# Patient Record
Sex: FEMALE | Race: WHITE | NOT HISPANIC OR LATINO | Employment: PART TIME | URBAN - NONMETROPOLITAN AREA
[De-identification: names, ages, dates, MRNs, and addresses within clinical notes are randomized per-mention and may not be internally consistent; named-entity substitution may affect disease eponyms.]

---

## 2017-01-03 ENCOUNTER — OFFICE VISIT (OUTPATIENT)
Dept: OBSTETRICS AND GYNECOLOGY | Facility: CLINIC | Age: 27
End: 2017-01-03

## 2017-01-03 VITALS
SYSTOLIC BLOOD PRESSURE: 98 MMHG | BODY MASS INDEX: 22.26 KG/M2 | WEIGHT: 121 LBS | DIASTOLIC BLOOD PRESSURE: 70 MMHG | HEIGHT: 62 IN

## 2017-01-03 DIAGNOSIS — F17.200 SMOKER: ICD-10-CM

## 2017-01-03 DIAGNOSIS — Z00.00 ANNUAL PHYSICAL EXAM: Primary | ICD-10-CM

## 2017-01-03 PROCEDURE — 99385 PREV VISIT NEW AGE 18-39: CPT | Performed by: OBSTETRICS & GYNECOLOGY

## 2017-01-03 PROCEDURE — 87591 N.GONORRHOEAE DNA AMP PROB: CPT | Performed by: OBSTETRICS & GYNECOLOGY

## 2017-01-03 PROCEDURE — 87491 CHLMYD TRACH DNA AMP PROBE: CPT | Performed by: OBSTETRICS & GYNECOLOGY

## 2017-01-03 PROCEDURE — G0123 SCREEN CERV/VAG THIN LAYER: HCPCS | Performed by: OBSTETRICS & GYNECOLOGY

## 2017-01-03 RX ORDER — NORGESTIMATE AND ETHINYL ESTRADIOL 7DAYSX3 28
KIT ORAL
COMMUNITY
Start: 2016-12-27 | End: 2018-01-05

## 2017-01-03 RX ORDER — LORAZEPAM 0.5 MG/1
0.5 TABLET ORAL EVERY 8 HOURS PRN
COMMUNITY
End: 2018-02-15

## 2017-01-03 RX ORDER — PRENATAL VIT/IRON FUM/FOLIC AC 27MG-0.8MG
TABLET ORAL
COMMUNITY
Start: 2016-12-27 | End: 2018-08-06 | Stop reason: SDUPTHER

## 2017-01-03 NOTE — MR AVS SNAPSHOT
Jennifer Wood   1/3/2017 10:45 AM   Office Visit    Dept Phone:  [phone]   Encounter #:  [id number]    Provider:  Alice Quesada MD   Department:  [facility]                Your Full Care Plan              Your Updated Medication List          This list is accurate as of: 1/3/17 11:34 AM.  Always use your most recent med list.                LORazepam 0.5 MG tablet   Commonly known as:  ATIVAN       prenatal vitamin 27-0.8 27-0.8 MG tablet tablet       TRI-SPRINTEC 0.18/0.215/0.25 MG-35 MCG per tablet   Generic drug:  norgestimate-ethinyl estradiol               You Were Diagnosed With        Codes Comments    Annual physical exam    -  Primary ICD-10-CM: Z00.00  ICD-9-CM: V70.0     Smoker     ICD-10-CM: F17.200  ICD-9-CM: 305.1     Body mass index (BMI) 22.0-22.9, adult     ICD-10-CM: Z68.22  ICD-9-CM: V85.1       Instructions     None    Patient Instructions History      Upcoming Appointments     Visit Type Date Time Department    NEW GYNECOLOGICAL 1/3/2017 10:45 AM [facility]ONNY MUSTAFA      [employer] Signup     [facility] [employer] allows you to send messages to your doctor, view your test results, renew your prescriptions, schedule appointments, and more. To sign up, go to [employer] and click on the Sign Up Now link in the New User? box. Enter your [employer] Activation Code exactly as it appears below along with the last four digits of your Social Security Number and your Date of Birth () to complete the sign-up process. If you do not sign up before the expiration date, you must request a new code.    [employer] Activation Code: [email or web address]  Expires: 2017 11:34 AM    If you have questions, you can email [email or web address] or call [phone] to talk to our [employer] staff. Remember, [employer] is NOT to be used for urgent needs. For medical emergencies, dial 911.               Other Info from Your Visit           Allergies     "Septra [Sulfamethoxazole-trimethoprim]      Sulfa Antibiotics        Reason for Visit     Gynecologic Exam new pt here today referred by Dr. Howell in [place] for annual exam.  pt voices no other concerns.       Vital Signs     Blood Pressure Height Weight Last Menstrual Period Body Mass Index Smoking Status    98/70 62\" (157.5 cm) 121 lb (54.9 kg) 12/26/2016 22.13 kg/m2 Never Assessed      Problems and Diagnoses Noted     Annual physical exam    -  Primary    Smoker        Body mass index between 19-24, adult            "

## 2017-01-03 NOTE — PROGRESS NOTES
"Subjective      Jennifer Wood is a 26 y.o. female who presents for her routine annual exam. Periods are regular every 28-30 days, lasting 4-5 days. Dysmenorrhea:none. Cyclic symptoms include none. No intermenstrual bleeding, spotting, or discharge.    Current contraception: OCP (estrogen/progesterone)  Regular self breast exam: no  History of abnormal Pap smear: no  History of abnormal mammogram: never been indicated  Exercise: never    Menstrual History:  OB History     No data available         Menarche age: 13  Patient's last menstrual period was 12/26/2016.       The following portions of the patient's history were reviewed and updated as appropriate: allergies, current medications, past family history, past medical history, past social history, past surgical history and problem list.    heterosexual    Family History  Family History   Problem Relation Age of Onset   • Colon polyps Mother    • Hypertension Paternal Grandfather    • Hypertension Paternal Grandmother    • Hypertension Maternal Grandmother        Review of Systems  Review of Systems   Constitutional: Negative for activity change and unexpected weight change.   HENT: Negative for congestion.    Respiratory: Positive for shortness of breath (only with panic attacks, rare).    Cardiovascular: Negative for chest pain.   Gastrointestinal: Negative for abdominal pain, blood in stool, constipation and diarrhea.   Endocrine: Negative for cold intolerance and heat intolerance.   Genitourinary: Positive for dyspareunia (only occasionally) and vaginal discharge (last month, but it is better now). Negative for menstrual problem and pelvic pain.   Musculoskeletal: Negative for arthralgias, back pain, neck pain and neck stiffness.   Skin: Negative for rash.   Neurological: Negative for dizziness and headaches.   Psychiatric/Behavioral: Negative for sleep disturbance. The patient is nervous/anxious.            Objective        Visit Vitals   • BP 98/70   • Ht 62\" " (157.5 cm)   • Wt 121 lb (54.9 kg)   • LMP 12/26/2016   • BMI 22.13 kg/m2     Physical Exam   Constitutional: She is oriented to person, place, and time. She appears well-developed and well-nourished. No distress.   HENT:   Head: Normocephalic and atraumatic.   Eyes: EOM are normal.   Neck: Normal range of motion. Neck supple.   Cardiovascular: Normal rate and regular rhythm.    No murmur heard.  Pulmonary/Chest: Effort normal and breath sounds normal. Right breast exhibits no inverted nipple and no mass. Left breast exhibits no inverted nipple and no mass.   Abdominal: Soft. She exhibits no distension. There is no tenderness.   Genitourinary: Vagina normal and uterus normal. No breast tenderness or discharge. Pelvic exam was performed with patient supine. There is no tenderness or lesion on the right labia. There is no tenderness or lesion on the left labia. Cervix exhibits no motion tenderness, no discharge and no friability. Right adnexum displays no tenderness and no fullness. Left adnexum displays no tenderness and no fullness. No tenderness or bleeding in the vagina. No vaginal discharge found.   Musculoskeletal: Normal range of motion. She exhibits no edema.   Neurological: She is alert and oriented to person, place, and time.   Skin: Skin is warm and dry.   Psychiatric: She has a normal mood and affect. Her behavior is normal. Judgment normal.   Nursing note and vitals reviewed.        Assessment  & Plan  Jennifer was seen today for gynecologic exam.    Diagnoses and all orders for this visit:    Annual physical exam  -     Liquid-based Pap Smear, Screening  - No abnormalities on exam, STD testing added to PAP at patient's request    Smoker Cessation encouraged, discussed relationship with fertility since she wants to be pregnant soon    Body mass index (BMI) 22.0-22.9, adult    This note was electronically signed.    Alice Quesada MD  1/3/2017  11:19 AM

## 2017-01-09 LAB
GEN CATEG CVX/VAG CYTO-IMP: NORMAL
HPV REFLEX?: NORMAL
LAB AP CASE REPORT: NORMAL
LAB AP GYN ADDITIONAL INFORMATION: NORMAL
Lab: NORMAL
PATH INTERP SPEC-IMP: NORMAL
PATH REPORT.ADDENDUM SPEC: NORMAL
STAT OF ADQ CVX/VAG CYTO-IMP: NORMAL

## 2018-01-05 ENCOUNTER — OFFICE VISIT (OUTPATIENT)
Dept: OBSTETRICS AND GYNECOLOGY | Facility: CLINIC | Age: 28
End: 2018-01-05

## 2018-01-05 VITALS
SYSTOLIC BLOOD PRESSURE: 120 MMHG | HEIGHT: 62 IN | BODY MASS INDEX: 25.95 KG/M2 | DIASTOLIC BLOOD PRESSURE: 70 MMHG | WEIGHT: 141 LBS

## 2018-01-05 DIAGNOSIS — E78.01 FAMILIAL HYPERCHOLESTEREMIA: ICD-10-CM

## 2018-01-05 DIAGNOSIS — F41.9 ANXIETY: ICD-10-CM

## 2018-01-05 DIAGNOSIS — Z00.00 ANNUAL PHYSICAL EXAM: Primary | ICD-10-CM

## 2018-01-05 DIAGNOSIS — Z87.891 FORMER SMOKER: ICD-10-CM

## 2018-01-05 PROCEDURE — 99395 PREV VISIT EST AGE 18-39: CPT | Performed by: OBSTETRICS & GYNECOLOGY

## 2018-01-05 PROCEDURE — 87491 CHLMYD TRACH DNA AMP PROBE: CPT | Performed by: OBSTETRICS & GYNECOLOGY

## 2018-01-05 PROCEDURE — G0123 SCREEN CERV/VAG THIN LAYER: HCPCS | Performed by: OBSTETRICS & GYNECOLOGY

## 2018-01-05 PROCEDURE — 87661 TRICHOMONAS VAGINALIS AMPLIF: CPT | Performed by: OBSTETRICS & GYNECOLOGY

## 2018-01-05 PROCEDURE — 87529 HSV DNA AMP PROBE: CPT | Performed by: OBSTETRICS & GYNECOLOGY

## 2018-01-05 PROCEDURE — 87591 N.GONORRHOEAE DNA AMP PROB: CPT | Performed by: OBSTETRICS & GYNECOLOGY

## 2018-01-05 NOTE — PROGRESS NOTES
"Subjective      Jennifer Wood is a 27 y.o. female who presents for her routine annual exam. Patient feeling \"a little stressed\" because she quit smoking yesterday, but is otherwise feeling well. Periods are regular every 28-30 days, lasting 5 days. Dysmenorrhea:more this past month since she quit her OCP's. Cyclic symptoms include none. No intermenstrual bleeding, spotting, or discharge.    Current contraception: none  Wants pregnancy later this year  Regular self breast exam: no  History of abnormal Pap smear: no  History of abnormal mammogram: N/A  Exercise: never    Menstrual History:  OB History      Para Term  AB Living    1    1     SAB TAB Ectopic Multiple Live Births    1             Patient's last menstrual period was 2017 (exact date).       The following portions of the patient's history were reviewed and updated as appropriate: allergies, current medications, past family history, past medical history, past social history, past surgical history and problem list.    heterosexual    Family History  Family History   Problem Relation Age of Onset   • Colon polyps Mother    • Hypertension Paternal Grandfather    • Hypertension Paternal Grandmother    • Hypertension Maternal Grandmother        Review of Systems  Review of Systems   Constitutional: Negative for activity change and unexpected weight change.   HENT: Negative for congestion.    Respiratory: Positive for shortness of breath (sometimes, with anxiety).    Cardiovascular: Negative for chest pain.   Gastrointestinal: Negative for abdominal pain, blood in stool, constipation and diarrhea.   Endocrine: Negative for cold intolerance and heat intolerance.   Genitourinary: Positive for dyspareunia (thinks that it could be dryness). Negative for difficulty urinating, pelvic pain, vaginal discharge and vaginal pain.   Musculoskeletal: Negative for arthralgias, back pain, neck pain and neck stiffness.   Skin: Negative for rash. " "  Neurological: Positive for headaches (as she is weaning off cigarettes). Negative for dizziness.   Psychiatric/Behavioral: Negative for sleep disturbance. The patient is nervous/anxious (only takes ativan prn, one script lasts all year long).            Objective        /70  Ht 157.5 cm (62\")  Wt 64 kg (141 lb)  LMP 12/19/2017 (Exact Date)  BMI 25.79 kg/m2  Physical Exam   Constitutional: She is oriented to person, place, and time. She appears well-developed and well-nourished. No distress.   HENT:   Head: Normocephalic and atraumatic.   Eyes: EOM are normal.   Neck: Normal range of motion. Neck supple.   Cardiovascular: Normal rate and regular rhythm.    No murmur heard.  Pulmonary/Chest: Effort normal and breath sounds normal. Right breast exhibits no inverted nipple and no mass. Left breast exhibits no inverted nipple and no mass.   Abdominal: Soft. She exhibits no distension. There is no tenderness.   Genitourinary: Vagina normal and uterus normal. No breast tenderness or discharge. Pelvic exam was performed with patient supine. There is no tenderness or lesion on the right labia. There is no tenderness or lesion on the left labia. Cervix exhibits no motion tenderness, no discharge and no friability. Right adnexum displays no tenderness and no fullness. Left adnexum displays no tenderness and no fullness. No tenderness or bleeding in the vagina. No vaginal discharge found.   Musculoskeletal: Normal range of motion. She exhibits no edema.   Neurological: She is alert and oriented to person, place, and time.   Skin: Skin is warm and dry.   Psychiatric: She has a normal mood and affect. Her behavior is normal. Judgment normal.   Nursing note and vitals reviewed.          Assessment  & Plan    Jennifer was seen today for gynecologic exam.    Diagnoses and all orders for this visit:    Annual physical exam: Exam unremarkable.  Patient just did screening labs with PCP.  SBE encouraged, education given.  " STD testing added to PAP at patient's request  -     Liquid-based Pap Smear, Screening - ThinPrep Vial, Cervix    Former smoker: Quit yesterday, lots of + reinforcement given.    BMI 25.0-25.9,adult    Anxiety: Ativan prn, but only uses occasionally.  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  Also discussed how exercise could benefit lipids.      This note was electronically signed.    Alice Quesada MD  1/5/2018  9:39 AM

## 2018-01-11 LAB
GEN CATEG CVX/VAG CYTO-IMP: NORMAL
LAB AP CASE REPORT: NORMAL
LAB AP GYN ADDITIONAL INFORMATION: NORMAL
LAB AP GYN OTHER FINDINGS: NORMAL
Lab: NORMAL
PATH INTERP SPEC-IMP: NORMAL
STAT OF ADQ CVX/VAG CYTO-IMP: NORMAL

## 2018-02-03 ENCOUNTER — HOSPITAL ENCOUNTER (EMERGENCY)
Facility: HOSPITAL | Age: 28
Discharge: HOME | End: 2018-02-03
Attending: EMERGENCY MEDICINE | Admitting: EMERGENCY MEDICINE

## 2018-02-03 ENCOUNTER — APPOINTMENT (OUTPATIENT)
Dept: ULTRASOUND IMAGING | Facility: HOSPITAL | Age: 28
End: 2018-02-03

## 2018-02-03 VITALS
BODY MASS INDEX: 25.95 KG/M2 | HEIGHT: 62 IN | SYSTOLIC BLOOD PRESSURE: 107 MMHG | OXYGEN SATURATION: 98 % | RESPIRATION RATE: 16 BRPM | TEMPERATURE: 98.5 F | HEART RATE: 77 BPM | DIASTOLIC BLOOD PRESSURE: 57 MMHG | WEIGHT: 141 LBS

## 2018-02-03 DIAGNOSIS — Z3A.01 LESS THAN 8 WEEKS GESTATION OF PREGNANCY: ICD-10-CM

## 2018-02-03 DIAGNOSIS — N93.9 VAGINAL SPOTTING: Primary | ICD-10-CM

## 2018-02-03 LAB
ANION GAP SERPL CALCULATED.3IONS-SCNC: 12 MMOL/L (ref 5–15)
BASOPHILS # BLD AUTO: 0.03 10*3/MM3 (ref 0–0.2)
BASOPHILS NFR BLD AUTO: 0.3 % (ref 0–2)
BUN BLD-MCNC: 8 MG/DL (ref 7–21)
BUN/CREAT SERPL: 12.3 (ref 7–25)
CALCIUM SPEC-SCNC: 9.3 MG/DL (ref 8.4–10.2)
CANDIDA ALBICANS: NEGATIVE
CHLORIDE SERPL-SCNC: 103 MMOL/L (ref 95–110)
CO2 SERPL-SCNC: 24 MMOL/L (ref 22–31)
CREAT BLD-MCNC: 0.65 MG/DL (ref 0.5–1)
DEPRECATED RDW RBC AUTO: 41.7 FL (ref 36.4–46.3)
EOSINOPHIL # BLD AUTO: 0.14 10*3/MM3 (ref 0–0.7)
EOSINOPHIL NFR BLD AUTO: 1.3 % (ref 0–7)
ERYTHROCYTE [DISTWIDTH] IN BLOOD BY AUTOMATED COUNT: 12.9 % (ref 11.5–14.5)
GARDNERELLA VAGINALIS: NEGATIVE
GFR SERPL CREATININE-BSD FRML MDRD: 111 ML/MIN/1.73 (ref 60–165)
GLUCOSE BLD-MCNC: 80 MG/DL (ref 60–100)
HCG INTACT+B SERPL-ACNC: 7331.9 MIU/ML
HCT VFR BLD AUTO: 37.3 % (ref 35–45)
HGB BLD-MCNC: 13 G/DL (ref 12–15.5)
IMM GRANULOCYTES # BLD: 0.04 10*3/MM3 (ref 0–0.02)
IMM GRANULOCYTES NFR BLD: 0.4 % (ref 0–0.5)
LYMPHOCYTES # BLD AUTO: 2.56 10*3/MM3 (ref 0.6–4.2)
LYMPHOCYTES NFR BLD AUTO: 24.6 % (ref 10–50)
MCH RBC QN AUTO: 30.7 PG (ref 26.5–34)
MCHC RBC AUTO-ENTMCNC: 34.9 G/DL (ref 31.4–36)
MCV RBC AUTO: 88.2 FL (ref 80–98)
MONOCYTES # BLD AUTO: 0.61 10*3/MM3 (ref 0–0.9)
MONOCYTES NFR BLD AUTO: 5.9 % (ref 0–12)
NEUTROPHILS # BLD AUTO: 7.01 10*3/MM3 (ref 2–8.6)
NEUTROPHILS NFR BLD AUTO: 67.5 % (ref 37–80)
PLATELET # BLD AUTO: 322 10*3/MM3 (ref 150–450)
PMV BLD AUTO: 9.3 FL (ref 8–12)
POTASSIUM BLD-SCNC: 3.7 MMOL/L (ref 3.5–5.1)
RBC # BLD AUTO: 4.23 10*6/MM3 (ref 3.77–5.16)
SODIUM BLD-SCNC: 139 MMOL/L (ref 137–145)
TRICHOMONAS VAGINALIS PCR: NEGATIVE
WBC NRBC COR # BLD: 10.39 10*3/MM3 (ref 3.2–9.8)
WHOLE BLOOD HOLD SPECIMEN: NORMAL

## 2018-02-03 PROCEDURE — 99284 EMERGENCY DEPT VISIT MOD MDM: CPT

## 2018-02-03 PROCEDURE — 87660 TRICHOMONAS VAGIN DIR PROBE: CPT | Performed by: PHYSICIAN ASSISTANT

## 2018-02-03 PROCEDURE — 84702 CHORIONIC GONADOTROPIN TEST: CPT | Performed by: PHYSICIAN ASSISTANT

## 2018-02-03 PROCEDURE — 76817 TRANSVAGINAL US OBSTETRIC: CPT

## 2018-02-03 PROCEDURE — 87661 TRICHOMONAS VAGINALIS AMPLIF: CPT | Performed by: PHYSICIAN ASSISTANT

## 2018-02-03 PROCEDURE — 36415 COLL VENOUS BLD VENIPUNCTURE: CPT

## 2018-02-03 PROCEDURE — 87591 N.GONORRHOEAE DNA AMP PROB: CPT | Performed by: PHYSICIAN ASSISTANT

## 2018-02-03 PROCEDURE — 87480 CANDIDA DNA DIR PROBE: CPT | Performed by: PHYSICIAN ASSISTANT

## 2018-02-03 PROCEDURE — 87491 CHLMYD TRACH DNA AMP PROBE: CPT | Performed by: PHYSICIAN ASSISTANT

## 2018-02-03 PROCEDURE — 80048 BASIC METABOLIC PNL TOTAL CA: CPT | Performed by: PHYSICIAN ASSISTANT

## 2018-02-03 PROCEDURE — 87510 GARDNER VAG DNA DIR PROBE: CPT | Performed by: PHYSICIAN ASSISTANT

## 2018-02-03 PROCEDURE — 85025 COMPLETE CBC W/AUTO DIFF WBC: CPT | Performed by: PHYSICIAN ASSISTANT

## 2018-02-03 RX ORDER — SODIUM CHLORIDE 0.9 % (FLUSH) 0.9 %
10 SYRINGE (ML) INJECTION AS NEEDED
Status: DISCONTINUED | OUTPATIENT
Start: 2018-02-03 | End: 2018-02-03 | Stop reason: HOSPADM

## 2018-02-03 NOTE — DISCHARGE INSTRUCTIONS
First Trimester of Pregnancy  The first trimester of pregnancy is from week 1 until the end of week 12 (months 1 through 3). During this time, your baby will begin to develop inside you. At 6-8 weeks, the eyes and face are formed, and the heartbeat can be seen on ultrasound. At the end of 12 weeks, all the baby's organs are formed. Prenatal care is all the medical care you receive before the birth of your baby. Make sure you get good prenatal care and follow all of your doctor's instructions.  Follow these instructions at home:  Medicines  · Take medicine only as told by your doctor. Some medicines are safe and some are not during pregnancy.  · Take your prenatal vitamins as told by your doctor.  · Take medicine that helps you poop (stool softener) as needed if your doctor says it is okay.  Diet  · Eat regular, healthy meals.  · Your doctor will tell you the amount of weight gain that is right for you.  · Avoid raw meat and uncooked cheese.  · If you feel sick to your stomach (nauseous) or throw up (vomit):  ¨ Eat 4 or 5 small meals a day instead of 3 large meals.  ¨ Try eating a few soda crackers.  ¨ Drink liquids between meals instead of during meals.  · If you have a hard time pooping (constipation):  ¨ Eat high-fiber foods like fresh vegetables, fruit, and whole grains.  ¨ Drink enough fluids to keep your pee (urine) clear or pale yellow.  Activity and Exercise  · Exercise only as told by your doctor. Stop exercising if you have cramps or pain in your lower belly (abdomen) or low back.  · Try to avoid standing for long periods of time. Move your legs often if you must  one place for a long time.  · Avoid heavy lifting.  · Wear low-heeled shoes. Sit and stand up straight.  · You can have sex unless your doctor tells you not to.  Relief of Pain or Discomfort  · Wear a good support bra if your breasts are sore.  · Take warm water baths (sitz baths) to soothe pain or discomfort caused by hemorrhoids. Use  hemorrhoid cream if your doctor says it is okay.  · Rest with your legs raised if you have leg cramps or low back pain.  · Wear support hose if you have puffy, bulging veins (varicose veins) in your legs. Raise (elevate) your feet for 15 minutes, 3-4 times a day. Limit salt in your diet.  Prenatal Care  · Schedule your prenatal visits by the twelfth week of pregnancy.  · Write down your questions. Take them to your prenatal visits.  · Keep all your prenatal visits as told by your doctor.  Safety  · Wear your seat belt at all times when driving.  · Make a list of emergency phone numbers. The list should include numbers for family, friends, the hospital, and police and fire departments.  General Tips  · Ask your doctor for a referral to a local prenatal class. Begin classes no later than at the start of month 6 of your pregnancy.  · Ask for help if you need counseling or help with nutrition. Your doctor can give you advice or tell you where to go for help.  · Do not use hot tubs, steam rooms, or saunas.  · Do not douche or use tampons or scented sanitary pads.  · Do not cross your legs for long periods of time.  · Avoid litter boxes and soil used by cats.  · Avoid all smoking, herbs, and alcohol. Avoid drugs not approved by your doctor.  · Do not use any tobacco products, including cigarettes, chewing tobacco, and electronic cigarettes. If you need help quitting, ask your doctor. You may get counseling or other support to help you quit.  · Visit your dentist. At home, brush your teeth with a soft toothbrush. Be gentle when you floss.  Get help if:  · You are dizzy.  · You have mild cramps or pressure in your lower belly.  · You have a nagging pain in your belly area.  · You continue to feel sick to your stomach, throw up, or have watery poop (diarrhea).  · You have a bad smelling fluid coming from your vagina.  · You have pain with peeing (urination).  · You have increased puffiness (swelling) in your face, hands,  legs, or ankles.  Get help right away if:  · You have a fever.  · You are leaking fluid from your vagina.  · You have spotting or bleeding from your vagina.  · You have very bad belly cramping or pain.  · You gain or lose weight rapidly.  · You throw up blood. It may look like coffee grounds.  · You are around people who have [place] measles, fifth disease, or chickenpox.  · You have a very bad headache.  · You have shortness of breath.  · You have any kind of trauma, such as from a fall or a car accident.  This information is not intended to replace advice given to you by your health care provider. Make sure you discuss any questions you have with your health care provider.  Document Released: 06/05/2009 Document Revised: 05/25/2017 Document Reviewed: 10/28/2014  [employer] Interactive Patient Education © 2017 [employer]

## 2018-02-03 NOTE — ED PROVIDER NOTES
Subjective   HPI Comments: Patient presents to emergency department for vaginal bleeding which she describes as spotting with associated pelvic cramping.  Hx of pilonidal cyst surgery on 17 January and found out she was pregnant on 26 January.  6 weeks EGA.  States she has had a prior miscarriage in 2010 at 14 weeks.  She has since stopped all of her medications.  She has an appointment Dr Quesada in [place] for OB care 15 February.  Denies fever/chills.              Patient is a 27 y.o. female presenting with vaginal bleeding.   History provided by:  Patient   used: No    Vaginal Bleeding   Quality:  Spotting  Severity:  Mild  Onset quality:  Sudden  Duration:  2 days  Timing:  Constant  Progression:  Unchanged  Chronicity:  New  Possible pregnancy: yes    Context: at rest    Associated symptoms: no abdominal pain (pelvic cramping), no back pain, no dizziness, no dysuria, no fatigue, no fever, no nausea and no vaginal discharge    Risk factors: prior miscarriage    Risk factors: no bleeding disorder, no hx of ectopic pregnancy, no hx of endometriosis, does not have multiple partners, no new sexual partner, no ovarian cysts, no ovarian torsion, no PID, no STD, no STD exposure, no terminated pregnancies and does not have unprotected sex        Review of Systems   Constitutional: Negative for chills, fatigue and fever.   HENT: Negative for sore throat.    Respiratory: Negative for shortness of breath and wheezing.    Cardiovascular: Negative for chest pain, palpitations and leg swelling.   Gastrointestinal: Negative for abdominal pain (pelvic cramping), constipation, diarrhea, nausea and vomiting.   Genitourinary: Positive for pelvic pain and vaginal bleeding. Negative for dysuria, flank pain, frequency, hematuria and vaginal discharge.   Musculoskeletal: Negative for arthralgias, back pain, neck pain and neck stiffness.   Skin: Negative for color change.   Allergic/Immunologic: Negative for  "immunocompromised state.   Neurological: Negative for dizziness, syncope, speech difficulty, weakness, light-headedness and headaches.   Hematological: Does not bruise/bleed easily.   Psychiatric/Behavioral: Negative for confusion. The patient is nervous/anxious.        Past Medical History:   Diagnosis Date   • Anxiety    • Migraine    • Miscarriage 2010   • Ovarian cyst 2008/2010       Allergies   Allergen Reactions   • Septra [Sulfamethoxazole-Trimethoprim]    • Sulfa Antibiotics        History reviewed. No pertinent surgical history.    Family History   Problem Relation Age of Onset   • Colon polyps Mother    • Hypertension Paternal Grandfather    • Hypertension Paternal Grandmother    • Hypertension Maternal Grandmother        Social History     Social History   • Marital status: Single     Spouse name: N/A   • Number of children: N/A   • Years of education: N/A     Social History Main Topics   • Smoking status: Former Smoker     Quit date: 1/4/2018   • Smokeless tobacco: Never Used   • Alcohol use No   • Drug use: No   • Sexual activity: Yes     Partners: Male     Other Topics Concern   • None     Social History Narrative   • None           Objective      /55 (BP Location: Left arm, Patient Position: Lying)  Pulse 82  Temp 98.5 °F (36.9 °C) (Temporal Artery )  Comment (Src): Pt just had a drink  Resp 18  Ht 157.5 cm (62\")  Wt 64 kg (141 lb)  SpO2 98%  BMI 25.79 kg/m2    Physical Exam   Constitutional: She is oriented to person, place, and time. She appears well-developed and well-nourished.   HENT:   Head: Normocephalic and atraumatic.   Eyes: EOM are normal. Pupils are equal, round, and reactive to light.   Cardiovascular: Normal rate, regular rhythm, normal heart sounds and intact distal pulses.    Pulmonary/Chest: Effort normal and breath sounds normal. No respiratory distress. She has no wheezes.   Abdominal: Soft. Bowel sounds are normal. She exhibits no distension and no mass. There is no " tenderness. There is no guarding. Hernia confirmed negative in the right inguinal area and confirmed negative in the left inguinal area.   Genitourinary: Rectum normal and uterus normal. Rectal exam shows no external hemorrhoid and no mass. Pelvic exam was performed with patient supine. There is no rash, tenderness or lesion on the right labia. There is no rash, tenderness or lesion on the left labia. Cervix exhibits discharge. Cervix exhibits no motion tenderness and no friability. Right adnexum displays no mass and no tenderness. Left adnexum displays no mass and no tenderness. No erythema, tenderness or bleeding in the vagina. Vaginal discharge found.   Genitourinary Comments: White discharge in vagina.     Lymphadenopathy:        Right: No inguinal adenopathy present.        Left: No inguinal adenopathy present.   Neurological: She is alert and oriented to person, place, and time.   Skin: Skin is warm and dry.   Psychiatric: She has a normal mood and affect. Her behavior is normal. Thought content normal.   Nursing note and vitals reviewed.      Procedures         ED Course  ED Course      Results for orders placed or performed during the hospital encounter of 02/03/18   Gardnerella vaginalis, Trichomonas vaginalis, Candida albicans, PCR - Swab, Vagina   Result Value Ref Range    CANDIDA ALBICANS Negative     GARDNERELLA VAGINALIS Negative     TRICHOMONAS VAGINALIS Negative    Basic Metabolic Panel   Result Value Ref Range    Glucose 80 60 - 100 mg/dL    BUN 8 7 - 21 mg/dL    Creatinine 0.65 0.50 - 1.00 mg/dL    Sodium 139 137 - 145 mmol/L    Potassium 3.7 3.5 - 5.1 mmol/L    Chloride 103 95 - 110 mmol/L    CO2 24.0 22.0 - 31.0 mmol/L    Calcium 9.3 8.4 - 10.2 mg/dL    eGFR Non African Amer 111 >60 mL/min/1.73    BUN/Creatinine Ratio 12.3 7.0 - 25.0    Anion Gap 12.0 5.0 - 15.0 mmol/L   hCG, Quantitative, Pregnancy   Result Value Ref Range    HCG Quantitative 7331.90 (H) <=4.90 mIU/mL   CBC Auto Differential    Result Value Ref Range    WBC 10.39 (H) 3.20 - 9.80 10*3/mm3    RBC 4.23 3.77 - 5.16 10*6/mm3    Hemoglobin 13.0 12.0 - 15.5 g/dL    Hematocrit 37.3 35.0 - 45.0 %    MCV 88.2 80.0 - 98.0 fL    MCH 30.7 26.5 - 34.0 pg    MCHC 34.9 31.4 - 36.0 g/dL    RDW 12.9 11.5 - 14.5 %    RDW-SD 41.7 36.4 - 46.3 fl    MPV 9.3 8.0 - 12.0 fL    Platelets 322 150 - 450 10*3/mm3    Neutrophil % 67.5 37.0 - 80.0 %    Lymphocyte % 24.6 10.0 - 50.0 %    Monocyte % 5.9 0.0 - 12.0 %    Eosinophil % 1.3 0.0 - 7.0 %    Basophil % 0.3 0.0 - 2.0 %    Immature Grans % 0.4 0.0 - 0.5 %    Neutrophils, Absolute 7.01 2.00 - 8.60 10*3/mm3    Lymphocytes, Absolute 2.56 0.60 - 4.20 10*3/mm3    Monocytes, Absolute 0.61 0.00 - 0.90 10*3/mm3    Eosinophils, Absolute 0.14 0.00 - 0.70 10*3/mm3    Basophils, Absolute 0.03 0.00 - 0.20 10*3/mm3    Immature Grans, Absolute 0.04 (H) 0.00 - 0.02 10*3/mm3   Light Blue Top   Result Value Ref Range    Extra Tube hold for add-on      Us Ob Transvaginal    Result Date: 2/3/2018  Narrative: ULTRASOUND OB TRANSVAGINAL HISTORY:  27-year-old female with vaginal bleeding and cramping with estimated gestational age 6 weeks. COMPARISON:  None FINDINGS: Longitudinal and transverse oriented endovaginal scanning of the pelvis are obtained. There is an ovoid shaped cystic structure within the fundal endometrium with poor surrounding decidual reaction at present. It contains a yolk sac without fetal pole identified. No suspicious surrounding fluid collections at the level of the gestational sac or within the endometrium. No suspicious fluid collections within the endocervical canal. The myometrial echotexture is unremarkable. No focal mass or lobularity. The right ovary measures 2.3 x 3.3 x 1.98 cm for a volume of 7.94 mL. The left ovary measures 2.68 x 3.22 x 1.75 cc for a volume of 7.92 mL. Both image normal. No free pelvic fluid.     Impression: Small ovoid shaped cystic structure within the fundal endometrium most  consistent with gestational sac containing a yolk sac and no fetal pole. No surrounding fluid collection. Recommend clinical follow-up and ultrasound imaging as warranted. Normal sonographic appearance of the maternal ovaries. No free pelvic fluid. Electronically signed by:  Jorge Rios MD  2/3/2018 2:48 PM [facility] Workstation: [id number]                [facility]    Final diagnoses:   Vaginal spotting   Less than 8 weeks gestation of pregnancy            Danny Vasquez PA-C  02/03/18 [id number]

## 2018-02-04 LAB
C TRACH RRNA CVX QL NAA+PROBE: NEGATIVE
N GONORRHOEA RRNA SPEC QL NAA+PROBE: NEGATIVE
T VAGINALIS DNA VAG QL PROBE+SIG AMP: NEGATIVE

## 2018-02-15 ENCOUNTER — INITIAL PRENATAL (OUTPATIENT)
Dept: OBSTETRICS AND GYNECOLOGY | Facility: CLINIC | Age: 28
End: 2018-02-15

## 2018-02-15 ENCOUNTER — PROCEDURE VISIT (OUTPATIENT)
Dept: OBSTETRICS AND GYNECOLOGY | Facility: CLINIC | Age: 28
End: 2018-02-15

## 2018-02-15 VITALS — DIASTOLIC BLOOD PRESSURE: 62 MMHG | BODY MASS INDEX: 25.61 KG/M2 | WEIGHT: 140 LBS | SYSTOLIC BLOOD PRESSURE: 98 MMHG

## 2018-02-15 DIAGNOSIS — Z34.81 PRENATAL CARE, SUBSEQUENT PREGNANCY, FIRST TRIMESTER: Primary | ICD-10-CM

## 2018-02-15 DIAGNOSIS — F17.200 SMOKER: ICD-10-CM

## 2018-02-15 DIAGNOSIS — O36.80X0 ENCOUNTER TO DETERMINE FETAL VIABILITY OF PREGNANCY, SINGLE OR UNSPECIFIED FETUS: Primary | ICD-10-CM

## 2018-02-15 PROCEDURE — 99213 OFFICE O/P EST LOW 20 MIN: CPT | Performed by: OBSTETRICS & GYNECOLOGY

## 2018-02-15 PROCEDURE — 76817 TRANSVAGINAL US OBSTETRIC: CPT | Performed by: OBSTETRICS & GYNECOLOGY

## 2018-02-15 NOTE — PROGRESS NOTES
Unintentional pregnancy, but they are excited.  Patient feeling well.  No cramping or bleeding.  No N/V.  U/S for dates today.  Labs today.    Patient with h/o one prior SAB.  Patient is healthy, but did just have anesthesia for a pilonidal cyst before she realized that she was pregnant.  + smoker: currently smoking 4-5 cig/day.  Cessation discussed, she has already cut back a lot.  New OB restrictions reviewed.

## 2018-02-19 DIAGNOSIS — N39.0 URINARY TRACT INFECTION WITHOUT HEMATURIA, SITE UNSPECIFIED: Primary | ICD-10-CM

## 2018-02-19 PROBLEM — O23.41 URINARY TRACT INFECTION IN MOTHER DURING FIRST TRIMESTER OF PREGNANCY: Status: ACTIVE | Noted: 2018-02-19

## 2018-02-19 LAB
ABO GROUP BLD: NORMAL
BACTERIA UR CULT: ABNORMAL
BACTERIA UR CULT: ABNORMAL
BASOPHILS # BLD AUTO: 0 X10E3/UL (ref 0–0.2)
BASOPHILS NFR BLD AUTO: 0 %
BLD GP AB SCN SERPL QL: NEGATIVE
C TRACH RRNA SPEC QL NAA+PROBE: NEGATIVE
EOSINOPHIL # BLD AUTO: 0.1 X10E3/UL (ref 0–0.4)
EOSINOPHIL NFR BLD AUTO: 1 %
ERYTHROCYTE [DISTWIDTH] IN BLOOD BY AUTOMATED COUNT: 13.6 % (ref 12.3–15.4)
HBV SURFACE AG SERPL QL IA: NEGATIVE
HCT VFR BLD AUTO: 40.1 % (ref 34–46.6)
HGB BLD-MCNC: 13.2 G/DL (ref 11.1–15.9)
HIV 1+2 AB+HIV1 P24 AG SERPL QL IA: NON REACTIVE
IMM GRANULOCYTES # BLD: 0 X10E3/UL (ref 0–0.1)
IMM GRANULOCYTES NFR BLD: 0 %
LYMPHOCYTES # BLD AUTO: 2.4 X10E3/UL (ref 0.7–3.1)
LYMPHOCYTES NFR BLD AUTO: 21 %
MCH RBC QN AUTO: 30.8 PG (ref 26.6–33)
MCHC RBC AUTO-ENTMCNC: 32.9 G/DL (ref 31.5–35.7)
MCV RBC AUTO: 94 FL (ref 79–97)
MONOCYTES # BLD AUTO: 0.7 X10E3/UL (ref 0.1–0.9)
MONOCYTES NFR BLD AUTO: 6 %
N GONORRHOEA RRNA SPEC QL NAA+PROBE: NEGATIVE
NEUTROPHILS # BLD AUTO: 7.7 X10E3/UL (ref 1.4–7)
NEUTROPHILS NFR BLD AUTO: 72 %
OTHER ANTIBIOTIC SUSC ISLT: ABNORMAL
PLATELET # BLD AUTO: 316 X10E3/UL (ref 150–379)
RBC # BLD AUTO: 4.29 X10E6/UL (ref 3.77–5.28)
RH BLD: POSITIVE
RPR SER QL: NON REACTIVE
RUBV IGG SERPL IA-ACNC: 9.22 INDEX
WBC # BLD AUTO: 11 X10E3/UL (ref 3.4–10.8)

## 2018-02-19 RX ORDER — NITROFURANTOIN 25; 75 MG/1; MG/1
100 CAPSULE ORAL 2 TIMES DAILY
Qty: 10 CAPSULE | Refills: 0 | Status: SHIPPED | OUTPATIENT
Start: 2018-02-19 | End: 2018-02-24

## 2018-02-19 NOTE — ADDENDUM NOTE
Addended by: NAEEM ESPARZA on: 2/19/2018 10:37 AM     Modules accepted: Orders     Chief Complaint   Patient presents with   • Medicare Wellness Visit     medicare wellness       Medications: medications verified and updated  Refills needed today?  NO  Denies known Latex allergy or symptoms of Latex sensitivity.  Patient would like communication of their results via:    Home Phone: [phone] (home)  Okay to leave a message containing results? Yes  Tobacco history: verified      Health Maintenance Summary     Topic Due On Due Status Completed On    Colorectal Cancer Screening - Colonoscopy Apr 27, 2020 Not Due Apr 27, 2010    Immunization-Zoster Dec 4, 2009 Overdue     Immunization - Pneumococcal  Completed Jul 29, 2015    Medicare Wellness Visit Aug 1, 2017 Due On Aug 1, 2016    IMMUNIZATION - DTaP/Tdap/Td Mar 14, 2027 Not Due Mar 14, 2017    Immunization-Influenza Sep 1, 2017 Not Due Oct 6, 2016          Patient is due for topics as listed above, he wishes to discuss with provider .        [facility] status addressed. Patient Active.

## 2018-02-21 LAB — DRUGS UR: NORMAL

## 2018-03-05 ENCOUNTER — HOSPITAL ENCOUNTER (EMERGENCY)
Facility: HOSPITAL | Age: 28
Discharge: HOME | End: 2018-03-05
Admitting: EMERGENCY MEDICINE

## 2018-03-05 VITALS
SYSTOLIC BLOOD PRESSURE: 140 MMHG | RESPIRATION RATE: 16 BRPM | OXYGEN SATURATION: 99 % | HEART RATE: 94 BPM | WEIGHT: 146 LBS | TEMPERATURE: 97.9 F | HEIGHT: 62 IN | DIASTOLIC BLOOD PRESSURE: 65 MMHG | BODY MASS INDEX: 26.87 KG/M2

## 2018-03-05 DIAGNOSIS — Z51.89 VISIT FOR WOUND CHECK: Primary | ICD-10-CM

## 2018-03-05 DIAGNOSIS — L08.9 SUPERFICIAL SKIN INFECTION: ICD-10-CM

## 2018-03-05 PROCEDURE — 87070 CULTURE OTHR SPECIMN AEROBIC: CPT | Performed by: PHYSICIAN ASSISTANT

## 2018-03-05 PROCEDURE — 87205 SMEAR GRAM STAIN: CPT | Performed by: PHYSICIAN ASSISTANT

## 2018-03-05 PROCEDURE — 87147 CULTURE TYPE IMMUNOLOGIC: CPT | Performed by: PHYSICIAN ASSISTANT

## 2018-03-05 PROCEDURE — 99283 EMERGENCY DEPT VISIT LOW MDM: CPT

## 2018-03-05 RX ORDER — CLINDAMYCIN HYDROCHLORIDE 300 MG/1
300 CAPSULE ORAL 4 TIMES DAILY
Qty: 28 CAPSULE | Refills: 0 | Status: SHIPPED | OUTPATIENT
Start: 2018-03-05 | End: 2018-04-17

## 2018-03-05 NOTE — ED PROVIDER NOTES
Subjective   Patient is a 27 y.o. female presenting with wound check.   Wound Check   Associated symptoms: no fever      Patient is a pleasant 27-year-old  female with chief complaint of wound check.  The patient describes having a pilonidal cyst which she completed surgical intervention back in January 17, 2018 by Dr. Andujar in [place], [place].  The patient has followed up on several occasions.  Her last appointment was 3 weeks ago.  She was advised to stop packing wet to dry.  She felt as if her wound was a little bit more open and noticed some drainage in the past several days.  She complains of soreness without any pain.  She decided to come to ER for second opinion.  She is 10 weeks pregnant.  She denies any fever.  She denies being diabetic.  She is up-to-date with tetanus vaccination. She currently is taking azithromycin for a sinus infection.       Review of Systems   Constitutional: Negative.  Negative for activity change, appetite change, chills, fever and unexpected weight change.   HENT: Negative.    Eyes: Negative.    Respiratory: Negative.    Cardiovascular: Negative.    Genitourinary: Negative.    Skin: Positive for wound.   Neurological: Negative.    Psychiatric/Behavioral: Negative.        Past Medical History:   Diagnosis Date   • Anxiety    • Migraine    • Miscarriage 2010   • Ovarian cyst 2008/2010       Allergies   Allergen Reactions   • Septra [Sulfamethoxazole-Trimethoprim]    • Sulfa Antibiotics        History reviewed. No pertinent surgical history.    Family History   Problem Relation Age of Onset   • Colon polyps Mother    • Hypertension Paternal Grandfather    • Hypertension Paternal Grandmother    • Hypertension Maternal Grandmother        Social History     Social History   • Marital status: Single     Social History Main Topics   • Smoking status: Former Smoker     Quit date: 1/4/2018   • Smokeless tobacco: Never Used   • Alcohol use No   • Drug use: No   • Sexual  "activity: Yes     Partners: Male       Prior to Admission medications    Medication Sig Start Date End Date Taking? Authorizing Provider   Prenatal Vit-Fe Fumarate-FA (PRENATAL VITAMIN 27-0.8) 27-0.8 MG tablet tablet  12/27/16  Yes Historical Provider, MD   clindamycin (CLEOCIN) 300 MG capsule Take 1 capsule by mouth 4 (Four) Times a Day. 3/5/18   u-GAY Castellano       Medications - No data to display    /65 (BP Location: Right arm, Patient Position: Sitting)  Pulse 94  Temp 97.9 °F (36.6 °C) (Temporal Artery )   Resp 16  Ht 157.5 cm (62\")  Wt 66.2 kg (146 lb)  LMP 12/19/2017 (Exact Date)  SpO2 99%  BMI 26.7 kg/m2      Objective   Physical Exam   Constitutional: She is oriented to person, place, and time. She appears well-developed and well-nourished.  Non-toxic appearance. No distress.   HENT:   Head: Normocephalic and atraumatic.   Nose: Nose normal.   Mouth/Throat: Uvula is midline, oropharynx is clear and moist and mucous membranes are normal.   Eyes: Conjunctivae, EOM and lids are normal. Pupils are equal, round, and reactive to light. Lids are everted and swept, no foreign bodies found.   Neck: Trachea normal, normal range of motion, full passive range of motion without pain and phonation normal. Neck supple. Normal carotid pulses and no JVD present. Carotid bruit is not present. No rigidity.   Cardiovascular: Normal rate, regular rhythm, normal heart sounds, intact distal pulses and normal pulses.    Pulmonary/Chest: Effort normal and breath sounds normal. No stridor. No apnea and no tachypnea. No respiratory distress.   Abdominal: Soft. Normal appearance, normal aorta and bowel sounds are normal. There is no hepatosplenomegaly. There is no tenderness. No hernia.       Vascular Status -  Her exam exhibits right foot vasculature normal. Her exam exhibits no right foot edema. Her exam exhibits left foot vasculature normal. Her exam exhibits no left foot edema.  Neurological: She is " alert and oriented to person, place, and time. She has normal strength and normal reflexes. She displays normal reflexes. No cranial nerve deficit or sensory deficit. Coordination and gait normal. GCS eye subscore is 4. GCS verbal subscore is 5. GCS motor subscore is 6.   Skin: Skin is warm, dry and intact. She is not diaphoretic. No pallor.        2 cm shallow welldemarcated wound with scant exudate. No streaking. No erythema extending beyond surgical site.    Psychiatric: She has a normal mood and affect. Her speech is normal and behavior is normal.   Nursing note and vitals reviewed.      Procedures         Lab Results (last 24 hours)     ** No results found for the last 24 hours. **          Us Ob Transvaginal    Result Date: 2/3/2018  Narrative: ULTRASOUND OB TRANSVAGINAL HISTORY:  27-year-old female with vaginal bleeding and cramping with estimated gestational age 6 weeks. COMPARISON:  None FINDINGS: Longitudinal and transverse oriented endovaginal scanning of the pelvis are obtained. There is an ovoid shaped cystic structure within the fundal endometrium with poor surrounding decidual reaction at present. It contains a yolk sac without fetal pole identified. No suspicious surrounding fluid collections at the level of the gestational sac or within the endometrium. No suspicious fluid collections within the endocervical canal. The myometrial echotexture is unremarkable. No focal mass or lobularity. The right ovary measures 2.3 x 3.3 x 1.98 cm for a volume of 7.94 mL. The left ovary measures 2.68 x 3.22 x 1.75 cc for a volume of 7.92 mL. Both image normal. No free pelvic fluid.     Impression: Small ovoid shaped cystic structure within the fundal endometrium most consistent with gestational sac containing a yolk sac and no fetal pole. No surrounding fluid collection. Recommend clinical follow-up and ultrasound imaging as warranted. Normal sonographic appearance of the maternal ovaries. No free pelvic fluid.  Electronically signed by:  Jorge Rios MD  2/3/2018 2:48 PM CST Workstation: [id number]      ED Course  ED Course        I have educated the patient and her significant other that we will complete wound culture.  We will prescribe clindamycin.  She was advised follow up with her surgeon and the wound culture in 2-3 days.    MDM    Final diagnoses:   Visit for wound check   Superficial skin infection          GAY Tan  03/05/18 9825

## 2018-03-05 NOTE — ED NOTES
Pt states the wound seems like the wound has split open some more. Pt is 10 weeks pregnant. Pt wants a second opinion     Mirian Garcia  03/05/18 1000

## 2018-03-05 NOTE — DISCHARGE INSTRUCTIONS
Keep wound clean and covered.  followup with wound culture in 3 days. followup with general surgeon.  Recommend probiotics.

## 2018-03-08 LAB
BACTERIA SPEC AEROBE CULT: ABNORMAL
BACTERIA SPEC AEROBE CULT: ABNORMAL
GRAM STN SPEC: ABNORMAL
GRAM STN SPEC: ABNORMAL

## 2018-03-15 ENCOUNTER — ROUTINE PRENATAL (OUTPATIENT)
Dept: OBSTETRICS AND GYNECOLOGY | Facility: CLINIC | Age: 28
End: 2018-03-15

## 2018-03-15 VITALS — WEIGHT: 149 LBS | BODY MASS INDEX: 27.25 KG/M2 | SYSTOLIC BLOOD PRESSURE: 94 MMHG | DIASTOLIC BLOOD PRESSURE: 56 MMHG

## 2018-03-15 DIAGNOSIS — Z87.891 FORMER SMOKER: ICD-10-CM

## 2018-03-15 DIAGNOSIS — Z34.81 PRENATAL CARE, SUBSEQUENT PREGNANCY, FIRST TRIMESTER: Primary | ICD-10-CM

## 2018-03-15 PROCEDURE — 3008F BODY MASS INDEX DOCD: CPT | Performed by: NURSE PRACTITIONER

## 2018-03-15 PROCEDURE — 99212 OFFICE O/P EST SF 10 MIN: CPT | Performed by: NURSE PRACTITIONER

## 2018-03-15 PROCEDURE — 1036F TOBACCO NON-USER: CPT | Performed by: NURSE PRACTITIONER

## 2018-03-15 NOTE — PROGRESS NOTES
Pt reports feeling well.   Denies UC's, pelvic pain, vaginal bleeding, and HA.  Pt to considering panorama/[id number], will have drawn next visit if desires.   Pt reports she stopped smoking completely last week.   Discussed plan of care and S&S to report.

## 2018-04-17 ENCOUNTER — ROUTINE PRENATAL (OUTPATIENT)
Dept: OBSTETRICS AND GYNECOLOGY | Facility: CLINIC | Age: 28
End: 2018-04-17

## 2018-04-17 VITALS — WEIGHT: 148 LBS | DIASTOLIC BLOOD PRESSURE: 70 MMHG | SYSTOLIC BLOOD PRESSURE: 120 MMHG | BODY MASS INDEX: 27.07 KG/M2

## 2018-04-17 DIAGNOSIS — Z34.82 PRENATAL CARE, SUBSEQUENT PREGNANCY, SECOND TRIMESTER: ICD-10-CM

## 2018-04-17 DIAGNOSIS — Z87.891 FORMER SMOKER: Primary | ICD-10-CM

## 2018-04-17 PROCEDURE — 99212 OFFICE O/P EST SF 10 MIN: CPT | Performed by: OBSTETRICS & GYNECOLOGY

## 2018-04-17 NOTE — PROGRESS NOTES
No complaints except migraine HA's.  She reports that they respond well to tylenol.  No cramping or bleeding.  Peek for gender today.  Still not smoking, has been 1 month.  Partner is also non-smoker

## 2018-05-15 ENCOUNTER — ROUTINE PRENATAL (OUTPATIENT)
Dept: OBSTETRICS AND GYNECOLOGY | Facility: CLINIC | Age: 28
End: 2018-05-15

## 2018-05-15 VITALS — SYSTOLIC BLOOD PRESSURE: 98 MMHG | DIASTOLIC BLOOD PRESSURE: 60 MMHG | BODY MASS INDEX: 27.62 KG/M2 | WEIGHT: 151 LBS

## 2018-05-15 DIAGNOSIS — Z34.02 ENCOUNTER FOR SUPERVISION OF NORMAL FIRST PREGNANCY IN SECOND TRIMESTER: ICD-10-CM

## 2018-05-15 DIAGNOSIS — Z87.891 FORMER SMOKER: Primary | ICD-10-CM

## 2018-05-15 PROCEDURE — 99213 OFFICE O/P EST LOW 20 MIN: CPT | Performed by: OBSTETRICS & GYNECOLOGY

## 2018-05-15 RX ORDER — LORATADINE 10 MG/1
TABLET ORAL
COMMUNITY
Start: 2018-05-11 | End: 2018-07-10

## 2018-05-15 NOTE — PROGRESS NOTES
"Pt reports swelling in hands and feet sometimes at the end of work, but none now.  She also had an episode of anxiety a few nights ago, but reports that it resolved after about 30 minutes.   Patient reports that she was told her placenta looked \"low\" and that it would need re-checked - no report available yet.  No cramping or bleeding.  Completely quit smoking!      "

## 2018-06-13 ENCOUNTER — ROUTINE PRENATAL (OUTPATIENT)
Dept: OBSTETRICS AND GYNECOLOGY | Facility: CLINIC | Age: 28
End: 2018-06-13

## 2018-06-13 VITALS — SYSTOLIC BLOOD PRESSURE: 108 MMHG | WEIGHT: 161 LBS | DIASTOLIC BLOOD PRESSURE: 68 MMHG | BODY MASS INDEX: 29.45 KG/M2

## 2018-06-13 DIAGNOSIS — Z34.82 PRENATAL CARE, SUBSEQUENT PREGNANCY, SECOND TRIMESTER: Primary | ICD-10-CM

## 2018-06-13 DIAGNOSIS — Z87.891 FORMER SMOKER: ICD-10-CM

## 2018-06-13 PROCEDURE — 99213 OFFICE O/P EST LOW 20 MIN: CPT | Performed by: OBSTETRICS & GYNECOLOGY

## 2018-06-13 NOTE — PROGRESS NOTES
Patient reports swelling in hands, especially in the morning.  GCT and TDaP at next visit.  Will need to re-evaluate the placenta with 4D u/s at the visit after that.  Still not smoking.  Good FM.  No LOF or VB.  Questions answered about using vaginal weights for pelvic strengthening.

## 2018-07-23 ENCOUNTER — TELEPHONE (OUTPATIENT)
Dept: OBSTETRICS AND GYNECOLOGY | Facility: CLINIC | Age: 28
End: 2018-07-23

## 2018-07-23 NOTE — TELEPHONE ENCOUNTER
"Pt said a family emergency has came up and pt is out of town and wont be back for \"maybe another week\" we last saw her 6/13/18 and she was to come back and see us for a 4 week follow up 7/13/18. She said she may also miss her appt next week with us. She asked if she needs to be checked where she is out of town. I let her know yes she does need to be seen with someone for her prenatal care so they can make sure everything is going as planned and baby is ok. Pt understood. She is having no c/o or problems right now.     "

## 2018-07-24 NOTE — TELEPHONE ENCOUNTER
I cannot imagine that the HD would be allowed to turn her away, if she is looking for someone to see her and continue her prenatal care.  I do think that it is essential for her to have a glucola and it is also time for her to get a TDaP.

## 2018-07-24 NOTE — TELEPHONE ENCOUNTER
Spoke with pt we went ahead and did a transfer of records because she has no idea of when she will be able to come back now. She said she could be back next week or it may be more than that. I also sent her an order to get her glucose test done and a tdap vaccine. Pt was faxed her records today. She will call us back if she comes back in town. I let her know it is just very important to continue getting prenatal care and she understood.

## 2018-07-24 NOTE — TELEPHONE ENCOUNTER
"Pt called back again today she has tried to be seen in [place] where she is and no one will see her due to \"liability issues\". She said one office recommend her go to the ER just to at least get an US and make sure everything is going well with baby. She is having no c/o or problems. She is also asking about the Glucose test she needs to get done. Does she need to get an order from us and get it done somewhere there in [place] or can she wait until she gets back. She is thinking she may be back in 1-2 weeks. Please advise.  "

## 2018-08-06 ENCOUNTER — ROUTINE PRENATAL (OUTPATIENT)
Dept: OBSTETRICS AND GYNECOLOGY | Facility: CLINIC | Age: 28
End: 2018-08-06

## 2018-08-06 ENCOUNTER — TELEPHONE (OUTPATIENT)
Dept: OBSTETRICS AND GYNECOLOGY | Facility: CLINIC | Age: 28
End: 2018-08-06

## 2018-08-06 VITALS — WEIGHT: 173 LBS | BODY MASS INDEX: 31.64 KG/M2 | SYSTOLIC BLOOD PRESSURE: 132 MMHG | DIASTOLIC BLOOD PRESSURE: 58 MMHG

## 2018-08-06 DIAGNOSIS — Z34.83 ENCOUNTER FOR SUPERVISION OF OTHER NORMAL PREGNANCY IN THIRD TRIMESTER: Primary | ICD-10-CM

## 2018-08-06 PROBLEM — Z34.90 SUPERVISION OF NORMAL PREGNANCY: Status: ACTIVE | Noted: 2018-08-06

## 2018-08-06 PROBLEM — O23.41 URINARY TRACT INFECTION IN MOTHER DURING FIRST TRIMESTER OF PREGNANCY: Status: RESOLVED | Noted: 2018-02-19 | Resolved: 2018-08-06

## 2018-08-06 PROCEDURE — 90471 IMMUNIZATION ADMIN: CPT | Performed by: OBSTETRICS & GYNECOLOGY

## 2018-08-06 PROCEDURE — 90715 TDAP VACCINE 7 YRS/> IM: CPT | Performed by: OBSTETRICS & GYNECOLOGY

## 2018-08-06 PROCEDURE — 99213 OFFICE O/P EST LOW 20 MIN: CPT | Performed by: OBSTETRICS & GYNECOLOGY

## 2018-08-06 RX ORDER — PRENATAL VIT/IRON FUM/FOLIC AC 27MG-0.8MG
1 TABLET ORAL DAILY
Qty: 60 TABLET | Refills: 4 | Status: SHIPPED | OUTPATIENT
Start: 2018-08-06 | End: 2020-01-16 | Stop reason: SDUPTHER

## 2018-08-06 NOTE — PROGRESS NOTES
"Pt unable to void at this time. Pt has not been seen since 6/13/18. She went to [place] for vacation and her baby shower. While there, her boyfriend had \"a mental breakdown\" and was admitted to the hospital. She did not have prenatal care in this time.   "

## 2018-08-06 NOTE — PROGRESS NOTES
Hasn't been seen in 2 months, was in [place] with FOB  Anatomy US reviewed, partial placenta previa noted, will repeat US to reassess placenta  Good fetal movement  Abdomen nontender, no edema, mood normal  Glucola and Hgb today, Rh positive  Refill prenatal vitamin  Tdap in office today

## 2018-08-06 NOTE — TELEPHONE ENCOUNTER
Patient called asking about her GTT today. She was wondering if she could still have sugar in her morning coffee. I told the patient that she was fine to have her coffee as usual. Go about her day as she normally would, just to limit her sugar intake.

## 2018-08-07 LAB
GLUCOSE 1H P 50 G GLC PO SERPL-MCNC: 167 MG/DL (ref 70–140)
HGB BLD-MCNC: 11.4 G/DL (ref 12–16)

## 2018-08-09 DIAGNOSIS — O99.810 ABNORMAL GLUCOSE TOLERANCE IN PREGNANCY: Primary | ICD-10-CM

## 2018-08-10 LAB
GLUCOSE 1H P 100 G GLC PO SERPL-MCNC: 137 MG/DL (ref 70–180)
GLUCOSE 2H P 100 G GLC PO SERPL-MCNC: 150 MG/DL (ref 70–155)
GLUCOSE 3H P 100 G GLC PO SERPL-MCNC: 124 MG/DL (ref 70–140)
GLUCOSE P FAST SERPL-MCNC: 76 MG/DL (ref 70–95)

## 2018-08-20 ENCOUNTER — TELEPHONE (OUTPATIENT)
Dept: OBSTETRICS AND GYNECOLOGY | Facility: CLINIC | Age: 28
End: 2018-08-20

## 2018-08-20 NOTE — TELEPHONE ENCOUNTER
Pt called and said she was having SOB and she had something on her phone to check her oxygen sat and said it was 95% but it has been dropping to 92%. I told pt she needs to go to ER to be seen if she is having any SOB to be assessed she understood.

## 2018-08-21 ENCOUNTER — PROCEDURE VISIT (OUTPATIENT)
Dept: OBSTETRICS AND GYNECOLOGY | Facility: CLINIC | Age: 28
End: 2018-08-21

## 2018-08-21 ENCOUNTER — ROUTINE PRENATAL (OUTPATIENT)
Dept: OBSTETRICS AND GYNECOLOGY | Facility: CLINIC | Age: 28
End: 2018-08-21

## 2018-08-21 VITALS — WEIGHT: 181 LBS | SYSTOLIC BLOOD PRESSURE: 104 MMHG | DIASTOLIC BLOOD PRESSURE: 68 MMHG | BODY MASS INDEX: 33.11 KG/M2

## 2018-08-21 DIAGNOSIS — O44.00 PLACENTA PREVIA ANTEPARTUM: Primary | ICD-10-CM

## 2018-08-21 DIAGNOSIS — Z3A.33 33 WEEKS GESTATION OF PREGNANCY: Primary | ICD-10-CM

## 2018-08-21 PROCEDURE — 76816 OB US FOLLOW-UP PER FETUS: CPT | Performed by: OBSTETRICS & GYNECOLOGY

## 2018-08-21 PROCEDURE — 99212 OFFICE O/P EST SF 10 MIN: CPT | Performed by: OBSTETRICS & GYNECOLOGY

## 2018-08-21 NOTE — PROGRESS NOTES
at 33.5 here for prenatal care. Denies VB. Denies LOF. Reports + Fm. Denies contractions. Patient was seen and evaluated by ER for shortness of breath and told it was physiologic. Denies SOA at this time.   US: vertex, anterior placena, previa no longer visualized, EFW 5 lb 5 oz, 61%ile   GTT normal   RTC in 2 weeks

## 2018-09-06 ENCOUNTER — ROUTINE PRENATAL (OUTPATIENT)
Dept: OBSTETRICS AND GYNECOLOGY | Facility: CLINIC | Age: 28
End: 2018-09-06

## 2018-09-06 VITALS — SYSTOLIC BLOOD PRESSURE: 126 MMHG | DIASTOLIC BLOOD PRESSURE: 70 MMHG | BODY MASS INDEX: 34.02 KG/M2 | WEIGHT: 186 LBS

## 2018-09-06 DIAGNOSIS — Z34.83 PRENATAL CARE, SUBSEQUENT PREGNANCY, THIRD TRIMESTER: Primary | ICD-10-CM

## 2018-09-06 PROCEDURE — 99214 OFFICE O/P EST MOD 30 MIN: CPT | Performed by: OBSTETRICS & GYNECOLOGY

## 2018-09-06 NOTE — PROGRESS NOTES
Pt now uncomfortable and reports increased pressure.  + cramping.  No LOF or VB.  Cultures done.  Cx closed.  Patient with lots of questions about labor, delivery and postpartum care.  Her birth plan was reviewed.  Questions answered.  45 minutes spent with couple

## 2018-09-10 LAB
C TRACH RRNA SPEC QL NAA+PROBE: NEGATIVE
GP B STREP DNA SPEC QL NAA+PROBE: NEGATIVE
N GONORRHOEA RRNA SPEC QL NAA+PROBE: NEGATIVE

## 2018-09-13 ENCOUNTER — ROUTINE PRENATAL (OUTPATIENT)
Dept: OBSTETRICS AND GYNECOLOGY | Facility: CLINIC | Age: 28
End: 2018-09-13

## 2018-09-13 VITALS — SYSTOLIC BLOOD PRESSURE: 136 MMHG | DIASTOLIC BLOOD PRESSURE: 80 MMHG | WEIGHT: 187 LBS | BODY MASS INDEX: 34.2 KG/M2

## 2018-09-13 DIAGNOSIS — Z34.83 PRENATAL CARE, SUBSEQUENT PREGNANCY, THIRD TRIMESTER: Primary | ICD-10-CM

## 2018-09-13 PROCEDURE — 99213 OFFICE O/P EST LOW 20 MIN: CPT | Performed by: OBSTETRICS & GYNECOLOGY

## 2018-09-13 NOTE — PROGRESS NOTES
+ cramping.  No LOF or VB  Good FM.  Planning cord blood collection, and already have kit  Kick counts discussed  GBS negative

## 2018-09-18 ENCOUNTER — TELEPHONE (OUTPATIENT)
Dept: OBSTETRICS AND GYNECOLOGY | Facility: CLINIC | Age: 28
End: 2018-09-18

## 2018-09-18 ENCOUNTER — ROUTINE PRENATAL (OUTPATIENT)
Dept: OBSTETRICS AND GYNECOLOGY | Facility: CLINIC | Age: 28
End: 2018-09-18

## 2018-09-18 ENCOUNTER — PROCEDURE VISIT (OUTPATIENT)
Dept: OBSTETRICS AND GYNECOLOGY | Facility: CLINIC | Age: 28
End: 2018-09-18

## 2018-09-18 VITALS — WEIGHT: 186 LBS | SYSTOLIC BLOOD PRESSURE: 130 MMHG | DIASTOLIC BLOOD PRESSURE: 90 MMHG | BODY MASS INDEX: 34.02 KG/M2

## 2018-09-18 DIAGNOSIS — Z87.891 FORMER SMOKER: ICD-10-CM

## 2018-09-18 DIAGNOSIS — O36.8130 DECREASED FETAL MOVEMENTS IN THIRD TRIMESTER, SINGLE OR UNSPECIFIED FETUS: Primary | ICD-10-CM

## 2018-09-18 DIAGNOSIS — Z34.83 PRENATAL CARE, SUBSEQUENT PREGNANCY, THIRD TRIMESTER: Primary | ICD-10-CM

## 2018-09-18 PROCEDURE — 76819 FETAL BIOPHYS PROFIL W/O NST: CPT | Performed by: OBSTETRICS & GYNECOLOGY

## 2018-09-18 PROCEDURE — 99213 OFFICE O/P EST LOW 20 MIN: CPT | Performed by: OBSTETRICS & GYNECOLOGY

## 2018-09-18 NOTE — TELEPHONE ENCOUNTER
"Pt called saying she has not felt fetal movement in 2 days. I clarified with her and asked her again and she said she has only \"felt pressure\" but no movement like she has had before. I connected her with scheduling to bring her in today for US and OV  "

## 2018-09-18 NOTE — PROGRESS NOTES
"Patient reports decreased FM since yesterday morning, but describes not feeling \"big kicks\" even though there is constant small movements.  BPP 8/8, BRYN 15.9, EFW 41%.  "

## 2018-09-27 ENCOUNTER — ROUTINE PRENATAL (OUTPATIENT)
Dept: OBSTETRICS AND GYNECOLOGY | Facility: CLINIC | Age: 28
End: 2018-09-27

## 2018-09-27 VITALS — SYSTOLIC BLOOD PRESSURE: 126 MMHG | BODY MASS INDEX: 35.3 KG/M2 | WEIGHT: 193 LBS | DIASTOLIC BLOOD PRESSURE: 84 MMHG

## 2018-09-27 DIAGNOSIS — Z3A.39 39 WEEKS GESTATION OF PREGNANCY: Primary | ICD-10-CM

## 2018-09-27 PROCEDURE — 99213 OFFICE O/P EST LOW 20 MIN: CPT | Performed by: OBSTETRICS & GYNECOLOGY

## 2018-09-27 NOTE — PROGRESS NOTES
at 39 weeks here for follow up. No complaints   PE see above   A/P  at 39 weeks routine prenatal   Labor precautions were discussed   RTC on Tuesday for recheck.

## 2018-10-02 ENCOUNTER — HOSPITAL ENCOUNTER (INPATIENT)
Facility: HOSPITAL | Age: 28
LOS: 3 days | Discharge: HOME | End: 2018-10-05
Attending: OBSTETRICS & GYNECOLOGY | Admitting: OBSTETRICS & GYNECOLOGY

## 2018-10-02 ENCOUNTER — ANESTHESIA EVENT (OUTPATIENT)
Dept: LABOR AND DELIVERY | Facility: HOSPITAL | Age: 28
End: 2018-10-02

## 2018-10-02 ENCOUNTER — ROUTINE PRENATAL (OUTPATIENT)
Dept: OBSTETRICS AND GYNECOLOGY | Facility: CLINIC | Age: 28
End: 2018-10-02

## 2018-10-02 ENCOUNTER — ANESTHESIA (OUTPATIENT)
Dept: LABOR AND DELIVERY | Facility: HOSPITAL | Age: 28
End: 2018-10-02

## 2018-10-02 VITALS — DIASTOLIC BLOOD PRESSURE: 72 MMHG | BODY MASS INDEX: 35.48 KG/M2 | SYSTOLIC BLOOD PRESSURE: 124 MMHG | WEIGHT: 194 LBS

## 2018-10-02 DIAGNOSIS — Z34.83 ENCOUNTER FOR SUPERVISION OF OTHER NORMAL PREGNANCY IN THIRD TRIMESTER: Primary | ICD-10-CM

## 2018-10-02 DIAGNOSIS — O36.8390 NON-REASSURING FETAL HEART RATE WITH LATE DECELERATION: Primary | ICD-10-CM

## 2018-10-02 PROBLEM — Z37.9 NORMAL LABOR: Status: RESOLVED | Noted: 2018-10-02 | Resolved: 2018-10-02

## 2018-10-02 PROBLEM — Z37.9 NORMAL LABOR: Status: ACTIVE | Noted: 2018-10-02

## 2018-10-02 PROBLEM — Z34.90 SUPERVISION OF NORMAL PREGNANCY: Status: RESOLVED | Noted: 2018-08-06 | Resolved: 2018-10-02

## 2018-10-02 LAB
ABO GROUP BLD: NORMAL
BLD GP AB SCN SERPL QL: NEGATIVE
DEPRECATED RDW RBC AUTO: 46.9 FL (ref 40–54)
ERYTHROCYTE [DISTWIDTH] IN BLOOD BY AUTOMATED COUNT: 14.3 % (ref 12–15)
GIANT PLATELETS: ABNORMAL
HCT VFR BLD AUTO: 38.2 % (ref 37–47)
HGB BLD-MCNC: 13.1 G/DL (ref 12–16)
LYMPHOCYTES # BLD MANUAL: 1.35 10*3/MM3 (ref 0.72–4.86)
LYMPHOCYTES NFR BLD MANUAL: 7 % (ref 4–12)
LYMPHOCYTES NFR BLD MANUAL: 9 % (ref 15–45)
MCH RBC QN AUTO: 30.9 PG (ref 28–32)
MCHC RBC AUTO-ENTMCNC: 34.3 G/DL (ref 33–36)
MCV RBC AUTO: 90.1 FL (ref 82–98)
MONOCYTES # BLD AUTO: 1.05 10*3/MM3 (ref 0.19–1.3)
NEUTROPHILS # BLD AUTO: 11.98 10*3/MM3 (ref 1.87–8.4)
NEUTROPHILS NFR BLD MANUAL: 80 % (ref 39–78)
PLATELET # BLD AUTO: 205 10*3/MM3 (ref 130–400)
PMV BLD AUTO: 10.7 FL (ref 6–12)
RBC # BLD AUTO: 4.24 10*6/MM3 (ref 4.2–5.4)
RBC MORPH BLD: NORMAL
RH BLD: POSITIVE
T&S EXPIRATION DATE: NORMAL
VARIANT LYMPHS NFR BLD MANUAL: 4 % (ref 0–5)
WBC MORPH BLD: NORMAL
WBC NRBC COR # BLD: 14.98 10*3/MM3 (ref 4.8–10.8)

## 2018-10-02 PROCEDURE — S0260 H&P FOR SURGERY: HCPCS | Performed by: OBSTETRICS & GYNECOLOGY

## 2018-10-02 PROCEDURE — 25010000002 ONDANSETRON PER 1 MG: Performed by: NURSE ANESTHETIST, CERTIFIED REGISTERED

## 2018-10-02 PROCEDURE — 85007 BL SMEAR W/DIFF WBC COUNT: CPT | Performed by: OBSTETRICS & GYNECOLOGY

## 2018-10-02 PROCEDURE — 86900 BLOOD TYPING SEROLOGIC ABO: CPT | Performed by: OBSTETRICS & GYNECOLOGY

## 2018-10-02 PROCEDURE — 25010000002 HYDROMORPHONE PER 4 MG: Performed by: NURSE ANESTHETIST, CERTIFIED REGISTERED

## 2018-10-02 PROCEDURE — 86850 RBC ANTIBODY SCREEN: CPT | Performed by: OBSTETRICS & GYNECOLOGY

## 2018-10-02 PROCEDURE — 25010000002 KETOROLAC TROMETHAMINE PER 15 MG: Performed by: NURSE ANESTHETIST, CERTIFIED REGISTERED

## 2018-10-02 PROCEDURE — 86901 BLOOD TYPING SEROLOGIC RH(D): CPT | Performed by: OBSTETRICS & GYNECOLOGY

## 2018-10-02 PROCEDURE — 63710000001 PROMETHAZINE PER 25 MG: Performed by: OBSTETRICS & GYNECOLOGY

## 2018-10-02 PROCEDURE — 85027 COMPLETE CBC AUTOMATED: CPT | Performed by: OBSTETRICS & GYNECOLOGY

## 2018-10-02 PROCEDURE — 51702 INSERT TEMP BLADDER CATH: CPT

## 2018-10-02 PROCEDURE — 59515 CESAREAN DELIVERY: CPT | Performed by: OBSTETRICS & GYNECOLOGY

## 2018-10-02 PROCEDURE — 25010000003 CEFAZOLIN PER 500 MG: Performed by: OBSTETRICS & GYNECOLOGY

## 2018-10-02 RX ORDER — METHYLERGONOVINE MALEATE 0.2 MG/ML
200 INJECTION INTRAVENOUS AS NEEDED
Status: DISCONTINUED | OUTPATIENT
Start: 2018-10-02 | End: 2018-10-02 | Stop reason: HOSPADM

## 2018-10-02 RX ORDER — ONDANSETRON 4 MG/1
4 TABLET, FILM COATED ORAL EVERY 6 HOURS PRN
Status: DISCONTINUED | OUTPATIENT
Start: 2018-10-02 | End: 2018-10-02

## 2018-10-02 RX ORDER — LIDOCAINE HYDROCHLORIDE 10 MG/ML
5 INJECTION, SOLUTION EPIDURAL; INFILTRATION; INTRACAUDAL; PERINEURAL AS NEEDED
Status: DISCONTINUED | OUTPATIENT
Start: 2018-10-02 | End: 2018-10-02 | Stop reason: HOSPADM

## 2018-10-02 RX ORDER — CARBOPROST TROMETHAMINE 250 UG/ML
250 INJECTION, SOLUTION INTRAMUSCULAR AS NEEDED
Status: DISCONTINUED | OUTPATIENT
Start: 2018-10-02 | End: 2018-10-02

## 2018-10-02 RX ORDER — SODIUM CHLORIDE, SODIUM LACTATE, POTASSIUM CHLORIDE, CALCIUM CHLORIDE 600; 310; 30; 20 MG/100ML; MG/100ML; MG/100ML; MG/100ML
125 INJECTION, SOLUTION INTRAVENOUS CONTINUOUS
Status: DISCONTINUED | OUTPATIENT
Start: 2018-10-02 | End: 2018-10-02

## 2018-10-02 RX ORDER — SODIUM CHLORIDE 0.9 % (FLUSH) 0.9 %
3 SYRINGE (ML) INJECTION EVERY 12 HOURS SCHEDULED
Status: DISCONTINUED | OUTPATIENT
Start: 2018-10-02 | End: 2018-10-02

## 2018-10-02 RX ORDER — OXYTOCIN/RINGER'S LACTATE 20/1000 ML
125 PLASTIC BAG, INJECTION (ML) INTRAVENOUS AS NEEDED
Status: DISCONTINUED | OUTPATIENT
Start: 2018-10-02 | End: 2018-10-02

## 2018-10-02 RX ORDER — OXYTOCIN/RINGER'S LACTATE 20/1000 ML
999 PLASTIC BAG, INJECTION (ML) INTRAVENOUS ONCE
Status: DISCONTINUED | OUTPATIENT
Start: 2018-10-02 | End: 2018-10-02 | Stop reason: HOSPADM

## 2018-10-02 RX ORDER — PRENATAL VIT/IRON FUM/FOLIC AC 27MG-0.8MG
1 TABLET ORAL DAILY
Status: DISCONTINUED | OUTPATIENT
Start: 2018-10-02 | End: 2018-10-05 | Stop reason: HOSPADM

## 2018-10-02 RX ORDER — SODIUM CHLORIDE 0.9 % (FLUSH) 0.9 %
3-10 SYRINGE (ML) INJECTION AS NEEDED
Status: DISCONTINUED | OUTPATIENT
Start: 2018-10-02 | End: 2018-10-02

## 2018-10-02 RX ORDER — TRISODIUM CITRATE DIHYDRATE AND CITRIC ACID MONOHYDRATE 500; 334 MG/5ML; MG/5ML
15 SOLUTION ORAL ONCE
Status: DISCONTINUED | OUTPATIENT
Start: 2018-10-02 | End: 2018-10-02 | Stop reason: HOSPADM

## 2018-10-02 RX ORDER — ONDANSETRON 4 MG/1
4 TABLET, ORALLY DISINTEGRATING ORAL EVERY 6 HOURS PRN
Status: DISCONTINUED | OUTPATIENT
Start: 2018-10-02 | End: 2018-10-02

## 2018-10-02 RX ORDER — LIDOCAINE HYDROCHLORIDE 10 MG/ML
5 INJECTION, SOLUTION EPIDURAL; INFILTRATION; INTRACAUDAL; PERINEURAL AS NEEDED
Status: DISCONTINUED | OUTPATIENT
Start: 2018-10-02 | End: 2018-10-02 | Stop reason: SDUPTHER

## 2018-10-02 RX ORDER — CARBOPROST TROMETHAMINE 250 UG/ML
250 INJECTION, SOLUTION INTRAMUSCULAR AS NEEDED
Status: DISCONTINUED | OUTPATIENT
Start: 2018-10-02 | End: 2018-10-02 | Stop reason: HOSPADM

## 2018-10-02 RX ORDER — PROMETHAZINE HYDROCHLORIDE 25 MG/1
12.5 TABLET ORAL EVERY 6 HOURS PRN
Status: DISCONTINUED | OUTPATIENT
Start: 2018-10-02 | End: 2018-10-02 | Stop reason: HOSPADM

## 2018-10-02 RX ORDER — SODIUM CHLORIDE, SODIUM LACTATE, POTASSIUM CHLORIDE, CALCIUM CHLORIDE 600; 310; 30; 20 MG/100ML; MG/100ML; MG/100ML; MG/100ML
125 INJECTION, SOLUTION INTRAVENOUS CONTINUOUS
Status: DISCONTINUED | OUTPATIENT
Start: 2018-10-02 | End: 2018-10-05 | Stop reason: HOSPADM

## 2018-10-02 RX ORDER — CALCIUM CARBONATE 200(500)MG
2 TABLET,CHEWABLE ORAL EVERY 6 HOURS PRN
Status: DISCONTINUED | OUTPATIENT
Start: 2018-10-02 | End: 2018-10-05 | Stop reason: HOSPADM

## 2018-10-02 RX ORDER — OXYCODONE HYDROCHLORIDE AND ACETAMINOPHEN 5; 325 MG/1; MG/1
1 TABLET ORAL EVERY 4 HOURS PRN
Status: DISCONTINUED | OUTPATIENT
Start: 2018-10-03 | End: 2018-10-05 | Stop reason: HOSPADM

## 2018-10-02 RX ORDER — FAMOTIDINE 10 MG/ML
20 INJECTION, SOLUTION INTRAVENOUS ONCE AS NEEDED
Status: DISCONTINUED | OUTPATIENT
Start: 2018-10-02 | End: 2018-10-02 | Stop reason: HOSPADM

## 2018-10-02 RX ORDER — PROMETHAZINE HYDROCHLORIDE 25 MG/ML
12.5 INJECTION, SOLUTION INTRAMUSCULAR; INTRAVENOUS EVERY 6 HOURS PRN
Status: DISCONTINUED | OUTPATIENT
Start: 2018-10-02 | End: 2018-10-02 | Stop reason: HOSPADM

## 2018-10-02 RX ORDER — ONDANSETRON 4 MG/1
4 TABLET, FILM COATED ORAL EVERY 8 HOURS PRN
Status: DISCONTINUED | OUTPATIENT
Start: 2018-10-02 | End: 2018-10-05 | Stop reason: HOSPADM

## 2018-10-02 RX ORDER — DIPHENHYDRAMINE HCL 25 MG
25 CAPSULE ORAL NIGHTLY PRN
Status: DISCONTINUED | OUTPATIENT
Start: 2018-10-02 | End: 2018-10-02

## 2018-10-02 RX ORDER — PROMETHAZINE HYDROCHLORIDE 25 MG/ML
12.5 INJECTION, SOLUTION INTRAMUSCULAR; INTRAVENOUS EVERY 6 HOURS PRN
Status: DISCONTINUED | OUTPATIENT
Start: 2018-10-02 | End: 2018-10-05 | Stop reason: HOSPADM

## 2018-10-02 RX ORDER — KETOROLAC TROMETHAMINE 30 MG/ML
INJECTION, SOLUTION INTRAMUSCULAR; INTRAVENOUS AS NEEDED
Status: DISCONTINUED | OUTPATIENT
Start: 2018-10-02 | End: 2018-10-02 | Stop reason: SURG

## 2018-10-02 RX ORDER — MISOPROSTOL 200 UG/1
800 TABLET ORAL AS NEEDED
Status: DISCONTINUED | OUTPATIENT
Start: 2018-10-02 | End: 2018-10-02 | Stop reason: HOSPADM

## 2018-10-02 RX ORDER — MISOPROSTOL 200 UG/1
800 TABLET ORAL AS NEEDED
Status: DISCONTINUED | OUTPATIENT
Start: 2018-10-02 | End: 2018-10-02

## 2018-10-02 RX ORDER — ONDANSETRON 2 MG/ML
4 INJECTION INTRAMUSCULAR; INTRAVENOUS EVERY 6 HOURS PRN
Status: DISCONTINUED | OUTPATIENT
Start: 2018-10-02 | End: 2018-10-02 | Stop reason: HOSPADM

## 2018-10-02 RX ORDER — BUTORPHANOL TARTRATE 1 MG/ML
0.5 INJECTION, SOLUTION INTRAMUSCULAR; INTRAVENOUS EVERY 6 HOURS PRN
Status: ACTIVE | OUTPATIENT
Start: 2018-10-02 | End: 2018-10-03

## 2018-10-02 RX ORDER — ONDANSETRON 2 MG/ML
INJECTION INTRAMUSCULAR; INTRAVENOUS AS NEEDED
Status: DISCONTINUED | OUTPATIENT
Start: 2018-10-02 | End: 2018-10-02 | Stop reason: SURG

## 2018-10-02 RX ORDER — ONDANSETRON 2 MG/ML
4 INJECTION INTRAMUSCULAR; INTRAVENOUS EVERY 6 HOURS PRN
Status: ACTIVE | OUTPATIENT
Start: 2018-10-02 | End: 2018-10-03

## 2018-10-02 RX ORDER — FAMOTIDINE 20 MG/1
20 TABLET, FILM COATED ORAL ONCE AS NEEDED
Status: DISCONTINUED | OUTPATIENT
Start: 2018-10-02 | End: 2018-10-02

## 2018-10-02 RX ORDER — ONDANSETRON 2 MG/ML
4 INJECTION INTRAMUSCULAR; INTRAVENOUS EVERY 6 HOURS PRN
Status: DISCONTINUED | OUTPATIENT
Start: 2018-10-02 | End: 2018-10-02

## 2018-10-02 RX ORDER — OXYTOCIN/RINGER'S LACTATE 20/1000 ML
125 PLASTIC BAG, INJECTION (ML) INTRAVENOUS AS NEEDED
Status: DISCONTINUED | OUTPATIENT
Start: 2018-10-02 | End: 2018-10-02 | Stop reason: HOSPADM

## 2018-10-02 RX ORDER — OXYCODONE HYDROCHLORIDE AND ACETAMINOPHEN 5; 325 MG/1; MG/1
1 TABLET ORAL EVERY 4 HOURS PRN
Status: DISCONTINUED | OUTPATIENT
Start: 2018-10-02 | End: 2018-10-02 | Stop reason: HOSPADM

## 2018-10-02 RX ORDER — OXYCODONE AND ACETAMINOPHEN 7.5; 325 MG/1; MG/1
1 TABLET ORAL EVERY 4 HOURS PRN
Status: DISPENSED | OUTPATIENT
Start: 2018-10-02 | End: 2018-10-03

## 2018-10-02 RX ORDER — DIPHENHYDRAMINE HYDROCHLORIDE 50 MG/ML
25 INJECTION INTRAMUSCULAR; INTRAVENOUS NIGHTLY PRN
Status: DISCONTINUED | OUTPATIENT
Start: 2018-10-02 | End: 2018-10-02 | Stop reason: HOSPADM

## 2018-10-02 RX ORDER — ONDANSETRON 4 MG/1
4 TABLET, FILM COATED ORAL EVERY 6 HOURS PRN
Status: DISCONTINUED | OUTPATIENT
Start: 2018-10-02 | End: 2018-10-02 | Stop reason: HOSPADM

## 2018-10-02 RX ORDER — METHYLERGONOVINE MALEATE 0.2 MG/ML
200 INJECTION INTRAVENOUS ONCE AS NEEDED
Status: DISCONTINUED | OUTPATIENT
Start: 2018-10-02 | End: 2018-10-02

## 2018-10-02 RX ORDER — HYDROMORPHONE HCL 110MG/55ML
2 PATIENT CONTROLLED ANALGESIA SYRINGE INTRAVENOUS EVERY 4 HOURS PRN
Status: DISCONTINUED | OUTPATIENT
Start: 2018-10-03 | End: 2018-10-05 | Stop reason: HOSPADM

## 2018-10-02 RX ORDER — HYDROMORPHONE HYDROCHLORIDE 1 MG/ML
0.5 INJECTION, SOLUTION INTRAMUSCULAR; INTRAVENOUS; SUBCUTANEOUS
Status: DISCONTINUED | OUTPATIENT
Start: 2018-10-02 | End: 2018-10-02 | Stop reason: HOSPADM

## 2018-10-02 RX ORDER — ACETAMINOPHEN 325 MG/1
650 TABLET ORAL EVERY 4 HOURS PRN
Status: DISCONTINUED | OUTPATIENT
Start: 2018-10-02 | End: 2018-10-05 | Stop reason: HOSPADM

## 2018-10-02 RX ORDER — OXYTOCIN/RINGER'S LACTATE 20/1000 ML
999 PLASTIC BAG, INJECTION (ML) INTRAVENOUS CONTINUOUS
Status: DISPENSED | OUTPATIENT
Start: 2018-10-02 | End: 2018-10-02

## 2018-10-02 RX ORDER — ONDANSETRON 4 MG/1
4 TABLET, ORALLY DISINTEGRATING ORAL EVERY 6 HOURS PRN
Status: DISCONTINUED | OUTPATIENT
Start: 2018-10-02 | End: 2018-10-02 | Stop reason: HOSPADM

## 2018-10-02 RX ORDER — OXYTOCIN/RINGER'S LACTATE 20/1000 ML
999 PLASTIC BAG, INJECTION (ML) INTRAVENOUS ONCE
Status: DISCONTINUED | OUTPATIENT
Start: 2018-10-02 | End: 2018-10-02

## 2018-10-02 RX ORDER — SIMETHICONE 80 MG
80 TABLET,CHEWABLE ORAL 4 TIMES DAILY PRN
Status: DISCONTINUED | OUTPATIENT
Start: 2018-10-02 | End: 2018-10-05 | Stop reason: HOSPADM

## 2018-10-02 RX ORDER — OXYCODONE AND ACETAMINOPHEN 10; 325 MG/1; MG/1
1 TABLET ORAL EVERY 4 HOURS PRN
Status: DISCONTINUED | OUTPATIENT
Start: 2018-10-03 | End: 2018-10-05 | Stop reason: HOSPADM

## 2018-10-02 RX ORDER — NALBUPHINE HCL 10 MG/ML
2.5 AMPUL (ML) INJECTION EVERY 4 HOURS PRN
Status: ACTIVE | OUTPATIENT
Start: 2018-10-02 | End: 2018-10-03

## 2018-10-02 RX ORDER — MISOPROSTOL 200 UG/1
600 TABLET ORAL ONCE
Status: DISCONTINUED | OUTPATIENT
Start: 2018-10-02 | End: 2018-10-05 | Stop reason: HOSPADM

## 2018-10-02 RX ORDER — METHYLERGONOVINE MALEATE 0.2 MG/ML
200 INJECTION INTRAVENOUS ONCE
Status: DISCONTINUED | OUTPATIENT
Start: 2018-10-02 | End: 2018-10-05 | Stop reason: HOSPADM

## 2018-10-02 RX ORDER — BUPIVACAINE HYDROCHLORIDE 7.5 MG/ML
INJECTION, SOLUTION EPIDURAL; RETROBULBAR AS NEEDED
Status: DISCONTINUED | OUTPATIENT
Start: 2018-10-02 | End: 2018-10-02 | Stop reason: SURG

## 2018-10-02 RX ORDER — IBUPROFEN 200 MG
600 TABLET ORAL EVERY 8 HOURS PRN
Status: DISCONTINUED | OUTPATIENT
Start: 2018-10-04 | End: 2018-10-05 | Stop reason: HOSPADM

## 2018-10-02 RX ORDER — CARBOPROST TROMETHAMINE 250 UG/ML
250 INJECTION, SOLUTION INTRAMUSCULAR ONCE
Status: DISCONTINUED | OUTPATIENT
Start: 2018-10-02 | End: 2018-10-05 | Stop reason: HOSPADM

## 2018-10-02 RX ORDER — OXYCODONE AND ACETAMINOPHEN 7.5; 325 MG/1; MG/1
2 TABLET ORAL EVERY 4 HOURS PRN
Status: ACTIVE | OUTPATIENT
Start: 2018-10-02 | End: 2018-10-03

## 2018-10-02 RX ORDER — ONDANSETRON 2 MG/ML
4 INJECTION INTRAMUSCULAR; INTRAVENOUS EVERY 6 HOURS PRN
Status: DISCONTINUED | OUTPATIENT
Start: 2018-10-02 | End: 2018-10-05 | Stop reason: HOSPADM

## 2018-10-02 RX ORDER — NALOXONE HCL 0.4 MG/ML
0.1 VIAL (ML) INJECTION
Status: DISCONTINUED | OUTPATIENT
Start: 2018-10-03 | End: 2018-10-05 | Stop reason: HOSPADM

## 2018-10-02 RX ORDER — IBUPROFEN 200 MG
600 TABLET ORAL EVERY 8 HOURS PRN
Status: DISCONTINUED | OUTPATIENT
Start: 2018-10-02 | End: 2018-10-02

## 2018-10-02 RX ORDER — DOCUSATE SODIUM 100 MG/1
100 CAPSULE, LIQUID FILLED ORAL 2 TIMES DAILY PRN
Status: DISCONTINUED | OUTPATIENT
Start: 2018-10-02 | End: 2018-10-05 | Stop reason: HOSPADM

## 2018-10-02 RX ORDER — PROMETHAZINE HYDROCHLORIDE 12.5 MG/1
12.5 SUPPOSITORY RECTAL EVERY 6 HOURS PRN
Status: DISCONTINUED | OUTPATIENT
Start: 2018-10-02 | End: 2018-10-02 | Stop reason: HOSPADM

## 2018-10-02 RX ORDER — IBUPROFEN 800 MG/1
800 TABLET ORAL EVERY 8 HOURS PRN
Status: DISCONTINUED | OUTPATIENT
Start: 2018-10-02 | End: 2018-10-02 | Stop reason: HOSPADM

## 2018-10-02 RX ORDER — PROMETHAZINE HYDROCHLORIDE 25 MG/1
25 TABLET ORAL EVERY 6 HOURS PRN
Status: DISCONTINUED | OUTPATIENT
Start: 2018-10-02 | End: 2018-10-05 | Stop reason: HOSPADM

## 2018-10-02 RX ORDER — PROMETHAZINE HYDROCHLORIDE 12.5 MG/1
12.5 SUPPOSITORY RECTAL EVERY 6 HOURS PRN
Status: DISCONTINUED | OUTPATIENT
Start: 2018-10-02 | End: 2018-10-05 | Stop reason: HOSPADM

## 2018-10-02 RX ORDER — KETOROLAC TROMETHAMINE 30 MG/ML
30 INJECTION, SOLUTION INTRAMUSCULAR; INTRAVENOUS EVERY 6 HOURS PRN
Status: DISPENSED | OUTPATIENT
Start: 2018-10-03 | End: 2018-10-04

## 2018-10-02 RX ORDER — FAMOTIDINE 10 MG/ML
20 INJECTION, SOLUTION INTRAVENOUS ONCE AS NEEDED
Status: DISCONTINUED | OUTPATIENT
Start: 2018-10-02 | End: 2018-10-02

## 2018-10-02 RX ORDER — NALOXONE HCL 0.4 MG/ML
0.4 VIAL (ML) INJECTION
Status: ACTIVE | OUTPATIENT
Start: 2018-10-02 | End: 2018-10-03

## 2018-10-02 RX ORDER — DIPHENHYDRAMINE HCL 25 MG
25 CAPSULE ORAL EVERY 4 HOURS PRN
Status: DISCONTINUED | OUTPATIENT
Start: 2018-10-02 | End: 2018-10-05 | Stop reason: HOSPADM

## 2018-10-02 RX ADMIN — OXYTOCIN 20 UNITS: 10 INJECTION INTRAVENOUS at 15:12

## 2018-10-02 RX ADMIN — OXYTOCIN 20 UNITS: 10 INJECTION INTRAVENOUS at 15:35

## 2018-10-02 RX ADMIN — KETOROLAC TROMETHAMINE 60 MG: 30 INJECTION INTRAMUSCULAR; INTRAVENOUS at 15:40

## 2018-10-02 RX ADMIN — ONDANSETRON HYDROCHLORIDE 4 MG: 2 SOLUTION INTRAMUSCULAR; INTRAVENOUS at 15:35

## 2018-10-02 RX ADMIN — BUPIVACAINE HYDROCHLORIDE 1.4 ML: 7.5 INJECTION, SOLUTION EPIDURAL; RETROBULBAR at 14:55

## 2018-10-02 RX ADMIN — HYDROMORPHONE HYDROCHLORIDE 100 MCG: 1 INJECTION, SOLUTION INTRAMUSCULAR; INTRAVENOUS; SUBCUTANEOUS at 14:55

## 2018-10-02 RX ADMIN — SODIUM CHLORIDE, POTASSIUM CHLORIDE, SODIUM LACTATE AND CALCIUM CHLORIDE: 600; 310; 30; 20 INJECTION, SOLUTION INTRAVENOUS at 15:34

## 2018-10-02 RX ADMIN — SODIUM CHLORIDE, POTASSIUM CHLORIDE, SODIUM LACTATE AND CALCIUM CHLORIDE 999 ML/HR: 600; 310; 30; 20 INJECTION, SOLUTION INTRAVENOUS at 12:25

## 2018-10-02 RX ADMIN — OXYCODONE HYDROCHLORIDE AND ACETAMINOPHEN 1 TABLET: 7.5; 325 TABLET ORAL at 23:26

## 2018-10-02 RX ADMIN — HYDROMORPHONE HYDROCHLORIDE 900 MCG: 1 INJECTION, SOLUTION INTRAMUSCULAR; INTRAVENOUS; SUBCUTANEOUS at 15:28

## 2018-10-02 RX ADMIN — PROMETHAZINE HYDROCHLORIDE 25 MG: 25 TABLET ORAL at 20:14

## 2018-10-02 RX ADMIN — SODIUM CHLORIDE, POTASSIUM CHLORIDE, SODIUM LACTATE AND CALCIUM CHLORIDE 999 ML/HR: 600; 310; 30; 20 INJECTION, SOLUTION INTRAVENOUS at 13:09

## 2018-10-02 RX ADMIN — HYDROXYETHYL STARCH 130/0.4 500 ML: 6 INJECTION, SOLUTION INTRAVENOUS at 13:08

## 2018-10-02 RX ADMIN — CEFAZOLIN 2 G: 1 INJECTION, POWDER, FOR SOLUTION INTRAVENOUS at 15:02

## 2018-10-02 RX ADMIN — SODIUM CHLORIDE, POTASSIUM CHLORIDE, SODIUM LACTATE AND CALCIUM CHLORIDE 300 ML: 600; 310; 30; 20 INJECTION, SOLUTION INTRAVENOUS at 15:02

## 2018-10-02 RX ADMIN — SODIUM CHLORIDE, POTASSIUM CHLORIDE, SODIUM LACTATE AND CALCIUM CHLORIDE: 600; 310; 30; 20 INJECTION, SOLUTION INTRAVENOUS at 14:59

## 2018-10-02 NOTE — H&P
[facility]  Jennifer Wood  : 1990  MRN: [id number]  [facility]: [id number]    History and Physical    Subjective   Jennifer Wood is a 28 y.o. year old  with an Estimated Date of Delivery: 10/4/18 currently at 39w5d presenting with leaking fluid after an exam in our office.  Fluid is meconium stained.  The patient is having occasional, irregular ctx, but they are not painful.    Prenatal care has been with Dr. Alice Quesada.  It has been benign.    Obstetric History       T0      L0     SAB1   TAB0   Ectopic0   Molar0   Multiple0   Live Births0       # Outcome Date GA Lbr Scottie/2nd Weight Sex Delivery Anes PTL Lv   2 Current            1 2010        FD        Past Medical History:   Diagnosis Date   • Anxiety    • Migraine    • Miscarriage    • Ovarian cyst      Past Surgical History:   Procedure Laterality Date   • CYST REMOVAL      2018       Current Facility-Administered Medications:   •  hydroxyethyl starch 6% in normal saline 0.9% 500 mL, 500 mL, Intravenous, Titrated, Alice Quesada MD, 500 mL at 10/02/18 1308  •  lactated ringers bolus 1,000 mL, 1,000 mL, Intravenous, Once, Kory Cee MD, Stopped at 10/02/18 1310  •  lactated ringers bolus 1,000 mL, 1,000 mL, Intravenous, Once, Alice Quesada MD  •  lactated ringers infusion, 125 mL/hr, Intravenous, Continuous, Kory Cee MD, Last Rate: 999 mL/hr at 10/02/18 1309, 999 mL/hr at 10/02/18 1309  •  lactated ringers infusion, 125 mL/hr, Intravenous, Continuous, Alice Quesada MD  •  lidocaine PF 1% (XYLOCAINE) injection 5 mL, 5 mL, Intradermal, PRN, Kory Cee MD  •  lidocaine PF 1% (XYLOCAINE) injection 5 mL, 5 mL, Intradermal, PRN, Alice Quesada MD  •  sodium chloride 0.9 % flush 3 mL, 3 mL, Intravenous, Q12H, Kory Cee MD  •  sodium chloride 0.9 % flush 3 mL, 3 mL, Intravenous, Q12H, Alice Quesada MD  •  sodium chloride 0.9 % flush 3-10 mL, 3-10 mL, Intravenous, PRN,  Kory Cee MD  •  sodium chloride 0.9 % flush 3-10 mL, 3-10 mL, Intravenous, PRN, Alice Quesada MD    Allergies   Allergen Reactions   • Septra [Sulfamethoxazole-Trimethoprim] Anaphylaxis   • Sulfa Antibiotics Anaphylaxis     Smoking status: Former Smoker                                                              Packs/day: 0.00      Years: 0.00         Quit date: 2018  Smokeless tobacco: Never Used                        Review of Systems      Objective   LMP 2017 (Exact Date)   Breastfeeding? Yes   General: well developed; well nourished  no acute distress   Heart: Not performed.   Lungs: breathing is unlabored   Abdomen: soft, non-tender; no masses  fundus firm and non-tender   FHT's: nonreactive  155 with minimal variability and occasional late decelerations.  Nonreactive status has not improved with fluid bolus or Hespan   Cervix: was not checked.   Presentation: cephalic   Contractions: irregular     Prenatal Labs  Lab Results   Component Value Date    HGB 13.1 10/02/2018    HEPBSAG Negative 02/15/2018    ABO A 10/02/2018    RH Positive 10/02/2018    ABSCRN Negative 10/02/2018    XEY1MYK3 Non Reactive 02/15/2018    URINECX Final report (A) 02/15/2018       Current Labs Reviewed   CBC and GBS and GC/CT       Assessment   1. IUP at 39w5d  2. Group B strep status: negative  3. Fetus nonreassuring     Plan   1.  section strongly encouraged.  Patient with many questions.  Partner and patient's mother both present for discussion.  Patient agreeable.    Alice Quesada MD  10/2/2018  2:21 PM

## 2018-10-02 NOTE — LACTATION NOTE
Initial Breastfeeding Teaching reviewed with patient and spouse. She  her last child 8 years ago for 3 months. She has a new Medela breast pump at home.     Maternal hx: , Anxiety, Migraines, Ovarian Cyst with removal, former smoker  Prenatal Meds: PNV  Pump:Medela

## 2018-10-02 NOTE — ANESTHESIA PROCEDURE NOTES
Spinal Block      Patient location during procedure: OR  Preanesthetic Checklist  Completed: patient identified, site marked, surgical consent, pre-op evaluation, timeout performed, IV checked, risks and benefits discussed and monitors and equipment checked  Spinal Block Prep:  Patient Position:sitting  Sterile Tech:cap, gloves, sterile barriers and mask  Prep:Betadine  Patient Monitoring:EKG, continuous pulse oximetry and blood pressure monitoring  Spinal Block Procedure  Approach:midline  Guidance:landmark technique and palpation technique  Location:L3-L4  Needle Type:Pencan  Needle Gauge:25 G  Placement of Spinal needle event:cerebrospinal fluid aspirated  Paresthesia: no  Fluid Appearance:clear  Post Assessment  Patient Tolerance:patient tolerated the procedure well with no apparent complications  Complications no

## 2018-10-02 NOTE — PROGRESS NOTES
Good fetal movement  Few mild contractions, feeling more pelvic pressure  Abdomen nontender, no edema  Cervix mid position and soft, head low  Membranes stripped  Labor instructions

## 2018-10-02 NOTE — ANESTHESIA PREPROCEDURE EVALUATION
Anesthesia Evaluation     Patient summary reviewed and Nursing notes reviewed   NPO Solid Status: > 8 hours  NPO Liquid Status: > 8 hours           Airway   Mallampati: II  TM distance: >3 FB  Neck ROM: full  No difficulty expected  Dental - normal exam     Pulmonary - negative pulmonary ROS and normal exam   Cardiovascular - normal exam  Exercise tolerance: good (4-7 METS)        Neuro/Psych  (+) headaches, psychiatric history Anxiety,     GI/Hepatic/Renal/Endo    (+) obesity,       Musculoskeletal     Abdominal    Substance History      OB/GYN    (+) Pregnant,         Other                        Anesthesia Plan    ASA 2     general and spinal     intravenous induction   Anesthetic plan, all risks, benefits, and alternatives have been provided, discussed and informed consent has been obtained with: patient.

## 2018-10-02 NOTE — OP NOTE
Holly Wood  : 1990  MRN: [id number]  CSN: [id number]     Section Operative Note    Pre-Operative Dx:   1. Intrauterine pregnancy at 39w5d weeks   2. non-reassuring fetal status and meconium, thick      Postoperative dx:    1.   Intrauterine pregnancy at 39w5d weeks   2.   non-reassuring fetal status and meconium, thick  3.   Tight nuchal cord X 2          Procedure: Primary low transverse -section (LTCS)       Surgeon: Alice Quesada MD           Anesthesia: Spinal        EBL: 500 mls.   IV Fluids: See anesthesia record   UOP: 400 mls.     Antibiotics: cefazolin 2 gms     Infant:           Gender: male  infant    Weight: 3650 g (8 lb 0.8 oz)     Apgars: 8   @ 1 minute / 9   @ 5 minutes    Cord gases: Venous:No results found for: PHCVEN, YEG1QVQK, JM3STBB, CRQ0TJAX, BECVEN, G7EHGQIEW     Arterial:No results found for: PHCART, OLY3XUQA, XL4OBJC, HEK0DOOI, BECART, U9HRZHMTV     Procedure Details:   The patient was taken to the OR where she was prepped and draped in the usual sterile fashion in the dorsal supine position with a leftward lateral tilt.  A Pfannenstiel incision was created sharply with the knife. That incision was carried through the underlying layers of fascia sharply.  The fascia was incised in the midline with the scalpel and this incision further developed using the fascial rip technique. The fascia was freed from its midline insertion superiorly and inferiorly. Rectus muscles were  in the midline. The peritoneum was entered bluntly, and the peritoneal window further developed using blunt stretching.  A bladder flap was not created sharply. The lower uterine segment was incised in a transverse fashion with the scalpel, and the uterine incision further developed with blunt stretching. Clear amniotic fluid was noted. The infant's head was delivered atraumatically.  A tight nuchal cord X 2 was reduced over the head before the body could be delivered. The  mouth and nose were bulb suctioned, while the cord was being clamped and cut.  The infant was then handed off to waiting NICU staff.  The placenta was allowed to deliver spontaneously. The uterus was exteriorized and cleared of clot and debris with moist laparotomy sponges. The uterine incision was closed with #0 vicryl in a continuous running locked fashion.  A second layer of #0 vicryl in a running fashion was used to imbricate the first.    The ovaries and fallopian tubes were noted to be normal, bilaterally.    The uterus was returned to the abdominal cavity. The paracolic gutters were cleared of clot and debris with moist laparotomy sponges. The uterine incision was inspected one final time and found to be hemostatic.  Kishan hemostatic matrix was applied over the hysterotomy incision to prevent adhesion formation during healing. The fascia was reapproximated  with #0 vicryl in a running fashion.  Roxy's fascia was loosely reapproximated with 3-0 vicryl and the skin was closed with 4-0 monocryl using a subcuticular stitch. Skin glue was applied bandage.  All counts were correct X2.         Complications:   None      Disposition:   Mother to Mother Baby/Postpartum  in stable condition currently.   Baby to remains with mom  in stable condition currently.       Alice Quesada MD  10/2/2018  3:36 PM

## 2018-10-03 LAB
BASOPHILS # BLD AUTO: 0.01 10*3/MM3 (ref 0–0.2)
BASOPHILS NFR BLD AUTO: 0.1 % (ref 0–2)
DEPRECATED RDW RBC AUTO: 48.8 FL (ref 40–54)
EOSINOPHIL # BLD AUTO: 0.05 10*3/MM3 (ref 0–0.7)
EOSINOPHIL NFR BLD AUTO: 0.5 % (ref 0–4)
ERYTHROCYTE [DISTWIDTH] IN BLOOD BY AUTOMATED COUNT: 14.5 % (ref 12–15)
HCT VFR BLD AUTO: 29.8 % (ref 37–47)
HGB BLD-MCNC: 10 G/DL (ref 12–16)
IMM GRANULOCYTES # BLD: 0.08 10*3/MM3 (ref 0–0.03)
IMM GRANULOCYTES NFR BLD: 0.8 % (ref 0–5)
LYMPHOCYTES # BLD AUTO: 0.95 10*3/MM3 (ref 0.72–4.86)
LYMPHOCYTES NFR BLD AUTO: 9.1 % (ref 15–45)
MCH RBC QN AUTO: 30.9 PG (ref 28–32)
MCHC RBC AUTO-ENTMCNC: 33.6 G/DL (ref 33–36)
MCV RBC AUTO: 92 FL (ref 82–98)
MONOCYTES # BLD AUTO: 0.71 10*3/MM3 (ref 0.19–1.3)
MONOCYTES NFR BLD AUTO: 6.8 % (ref 4–12)
NEUTROPHILS # BLD AUTO: 8.63 10*3/MM3 (ref 1.87–8.4)
NEUTROPHILS NFR BLD AUTO: 82.7 % (ref 39–78)
NRBC BLD MANUAL-RTO: 0 /100 WBC (ref 0–0)
PLATELET # BLD AUTO: 136 10*3/MM3 (ref 130–400)
PMV BLD AUTO: 10.2 FL (ref 6–12)
RBC # BLD AUTO: 3.24 10*6/MM3 (ref 4.2–5.4)
WBC NRBC COR # BLD: 10.43 10*3/MM3 (ref 4.8–10.8)

## 2018-10-03 PROCEDURE — 25010000002 KETOROLAC TROMETHAMINE PER 15 MG: Performed by: OBSTETRICS & GYNECOLOGY

## 2018-10-03 PROCEDURE — 85025 COMPLETE CBC W/AUTO DIFF WBC: CPT | Performed by: OBSTETRICS & GYNECOLOGY

## 2018-10-03 RX ADMIN — OXYCODONE HYDROCHLORIDE AND ACETAMINOPHEN 1 TABLET: 7.5; 325 TABLET ORAL at 04:02

## 2018-10-03 RX ADMIN — POLYETHYLENE GLYCOL 3350 17 G: 17 POWDER, FOR SOLUTION ORAL at 08:48

## 2018-10-03 RX ADMIN — OXYCODONE HYDROCHLORIDE AND ACETAMINOPHEN 1 TABLET: 7.5; 325 TABLET ORAL at 16:58

## 2018-10-03 RX ADMIN — PRENATAL VIT W/ FE FUMARATE-FA TAB 27-0.8 MG 1 TABLET: 27-0.8 TAB at 08:48

## 2018-10-03 RX ADMIN — SIMETHICONE 80 MG: 80 TABLET, CHEWABLE ORAL at 12:12

## 2018-10-03 RX ADMIN — OXYCODONE HYDROCHLORIDE AND ACETAMINOPHEN 1 TABLET: 10; 325 TABLET ORAL at 21:14

## 2018-10-03 RX ADMIN — OXYCODONE HYDROCHLORIDE AND ACETAMINOPHEN 1 TABLET: 7.5; 325 TABLET ORAL at 12:13

## 2018-10-03 RX ADMIN — IBUPROFEN 600 MG: 200 TABLET, FILM COATED ORAL at 16:59

## 2018-10-03 RX ADMIN — KETOROLAC TROMETHAMINE 30 MG: 30 INJECTION, SOLUTION INTRAMUSCULAR at 04:02

## 2018-10-03 NOTE — PAYOR COMM NOTE
"[facility]  [place],   [phone]  OR   FAX  [phone]    Tanner Liu  (28 y.o. Female)     Date of Birth Social Security Number Address Home Phone MRN    1990  [address] [place] [address] [phone] [id number]    [employer] Marital Status          Other Single       Admission Date Admission Type Admitting Provider Attending Provider Department, Room/Bed    10/2/18 Elective Kory Cee MD [place], Matthew L, MD [facility] MOTHER BABY 2A, M224/1    Discharge Date Discharge Disposition Discharge Destination                       Attending Provider:  Kory Cee MD    Allergies:  Septra [Sulfamethoxazole-trimethoprim], Sulfa Antibiotics    Isolation:  None   Infection:  None   Code Status:  CPR    Ht:  157.5 cm (62\")   Wt:  86.2 kg (190 lb)    Admission Cmt:  None   Principal Problem:  None                Active Insurance as of 10/2/2018     Primary Coverage     Payor Plan Insurance Group Employer/Plan Group    [employer] [place] [employer] [place]ICA[place]      Payor Plan Address Payor Plan Phone Number Effective From Effective To    [address] [address] [phone] 1/3/2017     [facility] [id number]       Subscriber Name Subscriber Birth Date Member ID       TANNER LIU 1990 [id number]                 Emergency Contacts      (Rel.) Home Phone Work Phone Mobile Phone    Ryan Osborne (Significant Other) [phone]        Inpatient Services prior Authorization   Fax [phone]  Phone [phone] To: [employer]   CHECK ONE OF THE FOLLOWING      £Skilled Nursing Facility     £ Rehab     £ LTACH     £ Hospice     X INPATIENT    CHOOSE THE APPROPRIATE REQUEST TYPE        Standard request         Requests for prior authorization (with supporting clinical information and documentation) should be sent to the Health Plan fourteen (14) days prior to the date the requested services will be performed. If a response has " not been received within two (2) business days, [phone] to confirm your request has been received.   ? Expedited Request  By signing below I certify that applying the standard review time frame may seriously jeopardize the life or health of the member or the member’s ability to regain maximum function.   Physician Signature Validating Expedited Request:                                                                     Date:   MEMBER INFORMATION   Member  Last Name:   ALEXA First Name    TANNER      1990   Member ID#    [id number]  Other Insurance:   REQUESTING PHYSICIAN INFORMATION   Provider Last Name :  SUBLETTE First Name   TEZ Provider NPI : [id number]    Type: Specialist  Specialty   OB/GYN Participating:     Phone Number: [phone] Fax Number:[phone] Office Contact:     Address: [address] [place] [address], [address] [place] [address]  Zip Code:[address]   FACILITY INFORMATION   Type:                                             Medical Record Number:  [id number]   Facility Name: [facility] Facility ID [id number] Tax [id number]   Phone Number: [phone] Fax Number: [phone] Hospital Contact:  [phone]  [facility]   Address: [address] [place] [address]  City: [place] State: [place] Zip: [address]   REQUESTED SERVICE(S)   Planned Date(s) of Service From: 10/02/2018 To:10/07/2018 Or Requested length of stay_5_days   CPT/HCPCS Code(s):  Procedure(s):     Primary ICD-10 Code(s)        Please attach documentation to support medical necessity.  SEE ATTACHMENTS   If this request is for out-of-network services, please provide an explanation below.           Alice Quesada MD Physician Addendum Obstetrics  Op Note Date of Service: 10/2/2018  3:36 PM            [place]  Tanner Wood  :   1990  MRN:   [id number]  CSN:    [id number]      Section Operative Note     Pre-Operative Dx: 1.   Intrauterine pregnancy at 39w5d weeks 2.   non-reassuring fetal status and meconium, thick        Postoperative dx:    1.   Intrauterine pregnancy at 39w5d weeks   2.   non-reassuring fetal status and meconium, thick  3.   Tight nuchal cord X 2             Procedure: Primary low transverse -section (LTCS)         Surgeon: Alice Quesada MD               Anesthesia: Spinal          EBL: 500 mls.   IV Fluids: See anesthesia record   UOP: 400 mls.      Antibiotics: cefazolin 2 gms      Infant:                 Gender: male  infant     Weight: 3650 g (8 lb 0.8 oz)      Apgars: 8   @ 1 minute / 9   @ 5 minutes     Cord gases: Venous:No results found for: PHCVEN, LYM9SLEH, LR5GXKG, QZG0ESLY, BECVEN, K9UQDRWBZ       Arterial:No results found for: PHCART, NNG0ZKUR, OD8VQAT, WNM9ZGUV, BECART, K7GIWXDQT      Procedure Details:   The patient was taken to the OR where she was prepped and draped in the usual sterile fashion in the dorsal supine position with a leftward lateral tilt.  A Pfannenstiel incision was created sharply with the knife. That incision was carried through the underlying layers of fascia sharply.  The fascia was incised in the midline with the scalpel and this incision further developed using the fascial rip technique. The fascia was freed from its midline insertion superiorly and inferiorly. Rectus muscles were  in the midline. The peritoneum was entered bluntly, and the peritoneal window further developed using blunt stretching.  A bladder flap was not created sharply. The lower uterine segment was incised in a transverse fashion with the scalpel, and the uterine incision further developed with blunt stretching. Clear amniotic fluid was noted. The infant's head was delivered atraumatically.  A tight nuchal cord X 2 was reduced over the head before the body could be delivered. The mouth and nose were bulb suctioned, while the cord was being clamped and cut.  The infant was then handed off to waiting NICU staff.  The placenta was allowed to deliver spontaneously. The uterus was  exteriorized and cleared of clot and debris with moist laparotomy sponges. The uterine incision was closed with #0 vicryl in a continuous running locked fashion.  A second layer of #0 vicryl in a running fashion was used to imbricate the first.     The ovaries and fallopian tubes were noted to be normal, bilaterally.     The uterus was returned to the abdominal cavity. The paracolic gutters were cleared of clot and debris with moist laparotomy sponges. The uterine incision was inspected one final time and found to be hemostatic.  Kishan hemostatic matrix was applied over the hysterotomy incision to prevent adhesion formation during healing. The fascia was reapproximated  with #0 vicryl in a running fashion.  Roxy's fascia was loosely reapproximated with 3-0 vicryl and the skin was closed with 4-0 monocryl using a subcuticular stitch. Skin glue was applied bandage.  All counts were correct X2.           Complications:   None       Disposition:   Mother to Mother Baby/Postpartum  in stable condition currently.   Baby to remains with mom  in stable condition currently. Infant transfer to NICU          Alice Quesada MD  10/2/2018  3:36 PM                     Revision History                                     History & Physical      Alice Quesada MD at 10/2/2018  2:21 PM           [place]  Jennifer Wood  : 1990  MRN: [id number]  CSN: [id number]    History and Physical    Subjective   Jennifer Wood is a 28 y.o. year old  with an Estimated Date of Delivery: 10/4/18 currently at 39w5d presenting with leaking fluid after an exam in our office.  Fluid is meconium stained.  The patient is having occasional, irregular ctx, but they are not painful.    Prenatal care has been with Dr. Alice Quesada.  It has been benign.    Obstetric History       T0      L0     SAB1   TAB0   Ectopic0   Molar0   Multiple0   Live Births0       # Outcome Date GA Lbr Scottie/2nd Weight Sex Delivery Anes PTL Lv   2  Current            1 SAB 2010        FD        Past Medical History:   Diagnosis Date   • Anxiety    • Migraine    • Miscarriage 2010   • Ovarian cyst 2008/2010     Past Surgical History:   Procedure Laterality Date   • CYST REMOVAL      JANUARY 2018       Current Facility-Administered Medications:   •  hydroxyethyl starch 6% in normal saline 0.9% 500 mL, 500 mL, Intravenous, Titrated, Alice Quesada MD, 500 mL at 10/02/18 1308  •  lactated ringers bolus 1,000 mL, 1,000 mL, Intravenous, Once, Kory Cee MD, Stopped at 10/02/18 1310  •  lactated ringers bolus 1,000 mL, 1,000 mL, Intravenous, Once, Alice Quesada MD  •  lactated ringers infusion, 125 mL/hr, Intravenous, Continuous, Kory Cee MD, Last Rate: 999 mL/hr at 10/02/18 1309, 999 mL/hr at 10/02/18 1309  •  lactated ringers infusion, 125 mL/hr, Intravenous, Continuous, Alice Quesada MD  •  lidocaine PF 1% (XYLOCAINE) injection 5 mL, 5 mL, Intradermal, PRN, Kory Cee MD  •  lidocaine PF 1% (XYLOCAINE) injection 5 mL, 5 mL, Intradermal, PRN, Alice Quesada MD  •  sodium chloride 0.9 % flush 3 mL, 3 mL, Intravenous, Q12H, Kory Cee MD  •  sodium chloride 0.9 % flush 3 mL, 3 mL, Intravenous, Q12H, Alice Quesada MD  •  sodium chloride 0.9 % flush 3-10 mL, 3-10 mL, Intravenous, PRN, Kory Cee MD  •  sodium chloride 0.9 % flush 3-10 mL, 3-10 mL, Intravenous, PRN, Alice Quesada MD    Allergies   Allergen Reactions   • Septra [Sulfamethoxazole-Trimethoprim] Anaphylaxis   • Sulfa Antibiotics Anaphylaxis     Smoking status: Former Smoker                                                              Packs/day: 0.00      Years: 0.00         Quit date: 1/4/2018  Smokeless tobacco: Never Used                        Review of Systems      Objective   LMP 12/19/2017 (Exact Date)   Breastfeeding? Yes   General: well developed; well nourished  no acute distress   Heart: Not performed.   Lungs: breathing is unlabored    Abdomen: soft, non-tender; no masses  fundus firm and non-tender   FHT's: nonreactive  155 with minimal variability and occasional late decelerations.  Nonreactive status has not improved with fluid bolus or Hespan   Cervix: was not checked.   Presentation: cephalic   Contractions: irregular     Prenatal Labs  Lab Results   Component Value Date    HGB 13.1 10/02/2018    HEPBSAG Negative 02/15/2018    ABO A 10/02/2018    RH Positive 10/02/2018    ABSCRN Negative 10/02/2018    IFR1JNX4 Non Reactive 02/15/2018    URINECX Final report (A) 02/15/2018       Current Labs Reviewed   CBC and GBS and GC/CT       Assessment   1. IUP at 39w5d  2. Group B strep status: negative  3. Fetus nonreassuring     Plan   1.  section strongly encouraged.  Patient with many questions.  Partner and patient's mother both present for discussion.  Patient agreeable.    Alice Quesada MD  10/2/2018  2:21 PM       Electronically signed by Alice Quesada MD at 10/2/2018  2:31 PM

## 2018-10-03 NOTE — LACTATION NOTE
39 5/7 week male infant, Rashard, delivered 10/2/18 @ 1512. Birth weight 8-0.8 (3650 g).  delivery for fetal intolerance. Infant admitted to NICU due to tachypnea and low oxygen saturation as well as increase WBCs. Infant is currently NPO. Mom states she set an alarm on her phone and pumped every 3 hours last night. She states she is only getting drops when she pumps.Offered support and encouragement to mother and explained this is normal for this stage. Encouraged her to continue doing what she is doing. Explained normal time for milk supply to come in is 3-5 days, and all factors that could affect this. Mom verbalizes understanding. No questions at this time.    Maternal Hx: , Anxiety, Migraines, Ovarian Cyst with removal, former smoker  Prenatal Meds: PNV  Pump: Spectra - helped pt assemble.

## 2018-10-03 NOTE — PROGRESS NOTES
[facility]   PROGRESS NOTE    Post-Op Day 1 S/P   Subjective     Patient reports:  Pain is well controlled with prescription NSAID's including ketorolac (Toradol) and narcotic analgesics including Percocet.  She is ambulating. Tolerating diet. Voiding - catheter dc/d at 6 am; flatus reported..  Vaginal bleeding is as much as expected.      Objective      Vitals: Vital Signs Range for the last 24 hours  Temperature: Temp:  [97 °F (36.1 °C)-98 °F (36.7 °C)] 98 °F (36.7 °C)   Temp Source: Temp src: Temporal Artery    BP: BP: (114-133)/(54-72) 114/54   Pulse: Heart Rate:  [72-98] 92   Respirations: Resp:  [16-20] 18   SPO2: SpO2:  [96 %-100 %] 98 %   O2 Amount (l/min):     O2 Devices Device (Oxygen Therapy): room air   Weight: Weight:  [86.2 kg (190 lb)-88 kg (194 lb)] 86.2 kg (190 lb)            Physical Exam    Lungs clear to auscultation bilaterally   Abdomen Soft, non-tender, normal bowel sounds; no bruits, organomegaly or masses.   Incision  healing well, no drainage, no erythema, no swelling, well approximated   Extremities extremities normal, atraumatic, no cyanosis or edema     I reviewed the patient's new clinical results.  Lab Results (last 24 hours)     Procedure Component Value Units Date/Time    CBC & Differential [[id number]] Collected:  10/03/18 0540    Specimen:  Blood Updated:  10/03/18 0556    Narrative:       The following orders were created for panel order CBC & Differential.  Procedure                               Abnormality         Status                     ---------                               -----------         ------                     CBC Auto Differential[[id number]]        Abnormal            Final result                 Please view results for these tests on the individual orders.    CBC Auto Differential [[id number]]  (Abnormal) Collected:  10/03/18 0540    Specimen:  Blood Updated:  10/03/18 0556     WBC 10.43 10*3/mm3      RBC 3.24 (L) 10*6/mm3      Hemoglobin 10.0  (L) g/dL      Hematocrit 29.8 (L) %      MCV 92.0 fL      MCH 30.9 pg      MCHC 33.6 g/dL      RDW 14.5 %      RDW-SD 48.8 fl      MPV 10.2 fL      Platelets 136 10*3/mm3      Neutrophil % 82.7 (H) %      Lymphocyte % 9.1 (L) %      Monocyte % 6.8 %      Eosinophil % 0.5 %      Basophil % 0.1 %      Immature Grans % 0.8 %      Neutrophils, Absolute 8.63 (H) 10*3/mm3      Lymphocytes, Absolute 0.95 10*3/mm3      Monocytes, Absolute 0.71 10*3/mm3      Eosinophils, Absolute 0.05 10*3/mm3      Basophils, Absolute 0.01 10*3/mm3      Immature Grans, Absolute 0.08 (H) 10*3/mm3      nRBC 0.0 /100 WBC     CBC & Differential [[id number]] Collected:  10/02/18 1234    Specimen:  Blood Updated:  10/02/18 1314    Narrative:       The following orders were created for panel order CBC & Differential.  Procedure                               Abnormality         Status                     ---------                               -----------         ------                     Manual Differential[[id number]]          Abnormal            Final result               CBC Auto Differential[[id number]]        Abnormal            Final result                 Please view results for these tests on the individual orders.    CBC Auto Differential [[id number]]  (Abnormal) Collected:  10/02/18 1234    Specimen:  Blood Updated:  10/02/18 1314     WBC 14.98 (H) 10*3/mm3      RBC 4.24 10*6/mm3      Hemoglobin 13.1 g/dL      Hematocrit 38.2 %      MCV 90.1 fL      MCH 30.9 pg      MCHC 34.3 g/dL      RDW 14.3 %      RDW-SD 46.9 fl      MPV 10.7 fL      Platelets 205 10*3/mm3     Manual Differential [[id number]]  (Abnormal) Collected:  10/02/18 1234    Specimen:  Blood Updated:  10/02/18 1314     Neutrophil % 80.0 (H) %      Lymphocyte % 9.0 (L) %      Monocyte % 7.0 %      Atypical Lymphocyte % 4.0 %      Neutrophils Absolute 11.98 (H) 10*3/mm3      Lymphocytes Absolute 1.35 10*3/mm3      Monocytes Absolute 1.05 10*3/mm3      RBC Morphology Normal     WBC  Morphology Normal     Giant Platelets Slight/1+          Assessment/Plan      Active Problems:    * No active hospital problems. *      Assessment:    Jennifer Wood is Day 1  post-partum  , Low Transverse    .       Plan:  continue post op care.        Lizz Joe DO  10/3/2018  9:15 AM

## 2018-10-03 NOTE — ANESTHESIA POSTPROCEDURE EVALUATION
Patient: Jeninfer Wood    Procedure Summary     Date:  10/02/18 Room / Location:  [facility] LABOR DELIVERY 2 /  [facility] LABOR DELIVERY    Anesthesia Start:  1448 Anesthesia Stop:      Procedure:   SECTION PRIMARY (N/A Abdomen) Diagnosis:      Surgeon:  Alice Quesada MD Provider:  Roman Maravilla CRNA    Anesthesia Type:  general, spinal ASA Status:  2          Anesthesia Type: general, spinal  Last vitals  BP   114/54 (10/03/18 0332)   Temp   98 °F (36.7 °C) (10/03/18 0332)   Pulse   92 (10/03/18 0332)   Resp   18 (10/03/18 0332)     SpO2   98 % (10/03/18 0332)     Post Anesthesia Care and Evaluation    Patient location during evaluation: PACU  Patient participation: complete - patient participated  Level of consciousness: awake and alert  Pain score: 0  Pain management: adequate  Airway patency: patent  Anesthetic complications: No anesthetic complications    Cardiovascular status: acceptable  Respiratory status: acceptable  Hydration status: acceptable  Post Neuraxial Block status: Motor and sensory function returned to baseline and No signs or symptoms of PDPH

## 2018-10-03 NOTE — LACTATION NOTE
39 5/7 week infant delivered 10/2/18 @ 1512. Birth weight 8-0.8 (3650 g).  delivery for fetal intolerance. Infant transitioning in NICU due to tachypnea. Initiated hospital grade breast pump. Instructed on pumping plan, instructions on assembly, and cleaning. Milk noted in flanges with first 5 minutes on pumping. Demonstrated hands on pumping. Mother anxious and worried about getting to infant in NICU. Offered support and encouragement. She attended breastfeeding class.   Maternal Hx: , Anxiety, Migraines, Ovarian Cyst with removal, former smoker  Prenatal Meds: PNV  Pump: Spectra - would like help assembling.

## 2018-10-04 RX ADMIN — OXYCODONE HYDROCHLORIDE AND ACETAMINOPHEN 1 TABLET: 10; 325 TABLET ORAL at 15:54

## 2018-10-04 RX ADMIN — IBUPROFEN 600 MG: 200 TABLET, FILM COATED ORAL at 01:13

## 2018-10-04 RX ADMIN — DOCUSATE SODIUM 100 MG: 100 CAPSULE, LIQUID FILLED ORAL at 19:18

## 2018-10-04 RX ADMIN — PRENATAL VIT W/ FE FUMARATE-FA TAB 27-0.8 MG 1 TABLET: 27-0.8 TAB at 08:05

## 2018-10-04 RX ADMIN — OXYCODONE HYDROCHLORIDE AND ACETAMINOPHEN 1 TABLET: 10; 325 TABLET ORAL at 11:25

## 2018-10-04 RX ADMIN — OXYCODONE HYDROCHLORIDE AND ACETAMINOPHEN 1 TABLET: 10; 325 TABLET ORAL at 20:09

## 2018-10-04 RX ADMIN — POLYETHYLENE GLYCOL 3350 17 G: 17 POWDER, FOR SOLUTION ORAL at 08:05

## 2018-10-04 RX ADMIN — IBUPROFEN 600 MG: 200 TABLET, FILM COATED ORAL at 11:25

## 2018-10-04 RX ADMIN — OXYCODONE HYDROCHLORIDE AND ACETAMINOPHEN 1 TABLET: 10; 325 TABLET ORAL at 01:13

## 2018-10-04 RX ADMIN — IBUPROFEN 600 MG: 200 TABLET, FILM COATED ORAL at 19:18

## 2018-10-04 RX ADMIN — OXYCODONE HYDROCHLORIDE AND ACETAMINOPHEN 1 TABLET: 10; 325 TABLET ORAL at 06:45

## 2018-10-04 NOTE — PLAN OF CARE
Problem: Patient Care Overview  Goal: Plan of Care Review  Outcome: Ongoing (interventions implemented as appropriate)   10/04/18 0304   Coping/Psychosocial   Plan of Care Reviewed With patient   Plan of Care Review   Progress improving   OTHER   Outcome Summary VSS, FFML u1 with scant lochia. No clots noted. Voiding and stooling without difficulty. PO pain meds controlling pain well. Pumping and visiting infant in NICU. ALT with dermabond CDI, no drainage.      Goal: Individualization and Mutuality  Outcome: Ongoing (interventions implemented as appropriate)    Goal: Discharge Needs Assessment  Outcome: Ongoing (interventions implemented as appropriate)      Problem: Postpartum ( Delivery) (Adult,Obstetrics,Pediatric)  Goal: Signs and Symptoms of Listed Potential Problems Will be Absent, Minimized or Managed (Postpartum)  Outcome: Ongoing (interventions implemented as appropriate)    Goal: Anesthesia/Sedation Recovery  Outcome: Ongoing (interventions implemented as appropriate)

## 2018-10-04 NOTE — LACTATION NOTE
39 5/7 week male infant, Rashard, delivered 10/2/18 @ 1512. Birth weight 8-0.8 (3650 g). Assisted with hand expression in NICU. Colostrum expressed is primarily gelatinous. Mother was concerned about colostrum not being milk or liquid. Explained that this is normal and saliva dissolves the milk. 3 drops of colostrum along with gelatinous content expressed and finger swept into infant's mouth. Mother had planned on only using Organic Formula if formula needed. Discussed mother's feeding plan and possibility of weaning from IVF if infant able to breastfeed or receive formula supplementation. Mother states she does want infant to wean off of IVF and is agreeable to infant receiving formula if needed. She does not want infant to receive any feedings by bottle. Discussed alternative feeding methods, such as syringe or OG depending on MD orders. Infant is currently tachypneic but mother is requesting she or the father hold infant skin to skin. Encouraged skin to skin with mother when able to aide in milk production and transition. Reviewed pumping plan, pumping in NICU with infant, hand expressing, skin to skin, protecting milk supply, initiating breastfeeds, and offered support. Update nurse on conversation and she is speaking with Dr. La about mother's wishes to supplement if needed. NICU folder with breastfeeding handouts given. Instructed on steaming breast pump parts. Sterile bottles needed.       Maternal Hx: , Anxiety, Migraines, Ovarian Cyst with removal, former smoker  Prenatal Meds: PNV  Pump: Spectra

## 2018-10-04 NOTE — PROGRESS NOTES
[facility]   PROGRESS NOTE    Post-Op Day 2 S/P   Subjective     Patient reports:  Pain is well controlled with ibuprofen (OTC) and narcotic analgesics including oxycodone/acetaminophen (Percocet, Tylox).  She is ambulating. Tolerating diet. Voiding - without difficulty; flatus reported..  Vaginal bleeding is as much as expected.      Objective      Vitals: Vital Signs Range for the last 24 hours  Temperature: Temp:  [96.7 °F (35.9 °C)-99.4 °F (37.4 °C)] 96.7 °F (35.9 °C)   Temp Source: Temp src: Temporal Artery    BP: BP: (120-127)/(56-68) 124/68   Pulse: Heart Rate:  [] 95   Respirations: Resp:  [17-22] 17   SPO2: SpO2:  [96 %-99 %] 99 %   O2 Amount (l/min):     O2 Devices Device (Oxygen Therapy): room air   Weight:              Physical Exam    Lungs clear to auscultation bilaterally   Abdomen Soft, approp tender   Incision  healing well, no erythema, no swelling   Extremities edema trace and Homans sign is negative, no sign of DVT     I reviewed the patient's new clinical results.  Lab Results (last 24 hours)     ** No results found for the last 24 hours. **          Assessment/Plan      Active Problems:    * No active hospital problems. *      Assessment:    Jennifer Wood is Day 2  post-partum  , Low Transverse    .       Plan:  continue post op care and plan for discharge tomorrow.        Kory Cee MD  10/4/2018  8:49 AM

## 2018-10-04 NOTE — PLAN OF CARE
Problem: Patient Care Overview  Goal: Plan of Care Review  Outcome: Ongoing (interventions implemented as appropriate)  Tolerating pain with po pain medication. Passing gas. No bowel movement as yet. Up and about. Voiding well. No clots. Working with pumping. Encouraged to go down more to bf and pump in NICU. Incision healing well. No redness, edema or bleeding or drainage. No vaccines needed   10/04/18 9148   Coping/Psychosocial   Plan of Care Reviewed With patient;significant other   Plan of Care Review   Progress improving     Goal: Individualization and Mutuality  Outcome: Ongoing (interventions implemented as appropriate)    Goal: Discharge Needs Assessment  Outcome: Ongoing (interventions implemented as appropriate)    Goal: Interprofessional Rounds/Family Conf  Outcome: Ongoing (interventions implemented as appropriate)      Problem: Postpartum ( Delivery) (Adult,Obstetrics,Pediatric)  Goal: Signs and Symptoms of Listed Potential Problems Will be Absent, Minimized or Managed (Postpartum)  Outcome: Ongoing (interventions implemented as appropriate)    Goal: Anesthesia/Sedation Recovery  Outcome: Outcome(s) achieved Date Met: 10/04/18

## 2018-10-05 VITALS
OXYGEN SATURATION: 100 % | SYSTOLIC BLOOD PRESSURE: 134 MMHG | HEART RATE: 76 BPM | BODY MASS INDEX: 34.96 KG/M2 | HEIGHT: 62 IN | DIASTOLIC BLOOD PRESSURE: 78 MMHG | RESPIRATION RATE: 20 BRPM | TEMPERATURE: 97.4 F | WEIGHT: 190 LBS

## 2018-10-05 RX ORDER — OXYCODONE HYDROCHLORIDE AND ACETAMINOPHEN 5; 325 MG/1; MG/1
1 TABLET ORAL EVERY 4 HOURS PRN
Qty: 30 TABLET | Refills: 0 | Status: SHIPPED | OUTPATIENT
Start: 2018-10-05 | End: 2018-10-13

## 2018-10-05 RX ADMIN — PRENATAL VIT W/ FE FUMARATE-FA TAB 27-0.8 MG 1 TABLET: 27-0.8 TAB at 09:08

## 2018-10-05 RX ADMIN — POLYETHYLENE GLYCOL 3350 17 G: 17 POWDER, FOR SOLUTION ORAL at 09:08

## 2018-10-05 RX ADMIN — OXYCODONE HYDROCHLORIDE AND ACETAMINOPHEN 1 TABLET: 10; 325 TABLET ORAL at 04:35

## 2018-10-05 RX ADMIN — OXYCODONE HYDROCHLORIDE AND ACETAMINOPHEN 1 TABLET: 10; 325 TABLET ORAL at 13:56

## 2018-10-05 RX ADMIN — IBUPROFEN 600 MG: 200 TABLET, FILM COATED ORAL at 03:30

## 2018-10-05 RX ADMIN — OXYCODONE HYDROCHLORIDE AND ACETAMINOPHEN 1 TABLET: 10; 325 TABLET ORAL at 09:07

## 2018-10-05 RX ADMIN — IBUPROFEN 600 MG: 200 TABLET, FILM COATED ORAL at 13:56

## 2018-10-05 RX ADMIN — OXYCODONE HYDROCHLORIDE AND ACETAMINOPHEN 1 TABLET: 10; 325 TABLET ORAL at 00:33

## 2018-10-05 NOTE — PROGRESS NOTES
[facility]   PROGRESS NOTE    Post-Op Day 3 S/P   Subjective     Patient reports:  Pain is well controlled with prescription NSAID's including ibuprofen (Motrin) and narcotic analgesics including oxycodone/acetaminophen (Percocet, Tylox).  She is ambulating. Tolerating diet. Voiding - without difficulty; flatus reported..  Vaginal bleeding is light.      Objective      Vitals: Vital Signs Range for the last 24 hours  Temperature: Temp:  [96.7 °F (35.9 °C)-98.5 °F (36.9 °C)] 97.6 °F (36.4 °C)   Temp Source: Temp src: Temporal Artery    BP: BP: (115-125)/(60-78) 115/60   Pulse: Heart Rate:  [] 74   Respirations: Resp:  [16-18] 16   SPO2: SpO2:  [98 %-100 %] 98 %   O2 Amount (l/min):     O2 Devices Device (Oxygen Therapy): room air   Weight:              Physical Exam    Lungs clear to auscultation bilaterally   Abdomen Soft, non-tender, normal bowel sounds; no bruits, organomegaly or masses.   Incision  healing well, no drainage, no erythema, no swelling, well approximated   Extremities extremities normal, atraumatic, no cyanosis or edema     I reviewed the patient's new clinical results.  Lab Results (last 24 hours)     ** No results found for the last 24 hours. **          Assessment/Plan      Active Problems:    * No active hospital problems. *      Assessment:    Jennifer Wood is Day 3  post-partum  , Low Transverse    .       Plan:  continue post op care.        Lizz Joe DO  10/5/2018  7:35 AM

## 2018-10-05 NOTE — DISCHARGE SUMMARY
Discharge Summary     Holly Wood  : 1990  MRN: [id number]  [facility]: [id number]    Date of Admission: 10/2/2018   Date of Discharge:  10/5/2018   Delivering Physician: Alice Quesada MD       Admission Diagnosis: 1. Normal labor [O80, Z37.9]   Discharge Diagnosis: 1. Pregnancy at 39w5d - delivered       Procedures: 1. Primary  (LTCS)     Hospital Course  See the completed operative report for details regarding antepartum course and delivery.    Her post-operative course was unremarkable.  On POD # 3 she felt like she ready ready for D/C.  She was evaluated by Dr. Joe who agreed she was able to be discharged to home.  She had no febrile morbidity. She had normal bowel and bladder function and was hemodynamically stable.  Her wound was healing well without obvious signs of infections.    Infant  male  fetus weighing 3650 g (8 lb 0.8 oz)   Apgars -  8  @ 1 minute /  9  @ 5 minutes.    Discharge labs  Lab Results   Component Value Date    WBC 10.43 10/03/2018    HGB 10.0 (L) 10/03/2018    HCT 29.8 (L) 10/03/2018     10/03/2018       Discharge Medications     Discharge Medications      New Medications      Instructions Start Date   oxyCODONE-acetaminophen 5-325 MG per tablet  Commonly known as:  PERCOCET   1 tablet, Oral, Every 4 Hours PRN         Continue These Medications      Instructions Start Date   prenatal vitamin 27-0.8 27-0.8 MG tablet tablet   1 tablet, Oral, Daily             Discharge Disposition Home or Self Care   Condition on Discharge: good   Follow-up: 2 weeks with Sangita Joe DO  10/5/2018

## 2018-10-05 NOTE — PLAN OF CARE
Problem: Patient Care Overview  Goal: Plan of Care Review  Outcome: Ongoing (interventions implemented as appropriate)   10/05/18 0322   Coping/Psychosocial   Plan of Care Reviewed With patient   Plan of Care Review   Progress improving   OTHER   Outcome Summary VSS; FFML u1 with small lochia. No clots noted. Voiding and ambulating without difficulty. PO pain meds controlling pain well. ALT with dermabond CDI, no drainage. Pt does not want flu vaccine.      Goal: Individualization and Mutuality  Outcome: Ongoing (interventions implemented as appropriate)    Goal: Discharge Needs Assessment  Outcome: Ongoing (interventions implemented as appropriate)      Problem: Postpartum ( Delivery) (Adult,Obstetrics,Pediatric)  Goal: Signs and Symptoms of Listed Potential Problems Will be Absent, Minimized or Managed (Postpartum)  Outcome: Ongoing (interventions implemented as appropriate)      Problem: Breastfeeding (Adult,Obstetrics,Pediatric)  Goal: Signs and Symptoms of Listed Potential Problems Will be Absent, Minimized or Managed (Breastfeeding)  Outcome: Ongoing (interventions implemented as appropriate)

## 2018-10-06 RX ORDER — VALACYCLOVIR HYDROCHLORIDE 1 G/1
2000 TABLET, FILM COATED ORAL EVERY 12 HOURS
Qty: 80 TABLET | Refills: 0 | Status: SHIPPED | OUTPATIENT
Start: 2018-10-06 | End: 2018-10-26

## 2018-10-06 NOTE — PAYOR COMM NOTE
"[facility] 10-5-18  [id number]    Tanner Liu  (28 y.o. Female)     Date of Birth Social Security Number Address Home Phone MRN    1990  [address] [address] [phone] [id number]    Congregational Marital Status          Other Single       Admission Date Admission Type Admitting Provider Attending Provider Department, Room/Bed    10/2/18 Elective Kory Cee MD  [facility] MOTHER BABY 2A, M224/1    Discharge Date Discharge Disposition Discharge Destination        10/5/2018 Home or Self Care              Attending Provider:  (none)   Allergies:  Septra [Sulfamethoxazole-trimethoprim], Sulfa Antibiotics    Isolation:  None   Infection:  None   Code Status:  Prior    Ht:  157.5 cm (62\")   Wt:  86.2 kg (190 lb)    Admission Cmt:  None   Principal Problem:  None                Active Insurance as of 10/2/2018     Primary Coverage     Payor Plan Insurance Group Employer/Plan Group    [employer] [place] [employer] [place]ICA[place]      Payor Plan Address Payor Plan Phone Number Effective From Effective To    [address] [address] [phone] 1/3/2017     [facility] [address]       Subscriber Name Subscriber Birth Date Member ID       TANNER LIU 1990 [id number]                 Emergency Contacts      (Rel.) Home Phone Work Phone Mobile Phone    Ryan Osborne (Significant Other) [phone] -- [phone]               Discharge Summary      Lizz Joe DO at 10/5/2018 11:30 AM          Discharge Summary     Holly Liu  : 1990  MRN: [id number]  CSN: [id number]    Date of Admission: 10/2/2018   Date of Discharge:  10/5/2018   Delivering Physician: Alice Quesada MD       Admission Diagnosis: 1. Normal labor [O80, Z37.9]   Discharge Diagnosis: 1. Pregnancy at 39w5d - delivered       Procedures: 1. Primary  (LTCS)     Hospital Course  See the completed operative report for details regarding antepartum course and delivery.    Her " post-operative course was unremarkable.  On POD # 3 she felt like she ready ready for D/C.  She was evaluated by Dr. Joe who agreed she was able to be discharged to home.  She had no febrile morbidity. She had normal bowel and bladder function and was hemodynamically stable.  Her wound was healing well without obvious signs of infections.    Infant  male  fetus weighing 3650 g (8 lb 0.8 oz)   Apgars -  8  @ 1 minute /  9  @ 5 minutes.    Discharge labs  Lab Results   Component Value Date    WBC 10.43 10/03/2018    HGB 10.0 (L) 10/03/2018    HCT 29.8 (L) 10/03/2018     10/03/2018       Discharge Medications     Discharge Medications      New Medications      Instructions Start Date   oxyCODONE-acetaminophen 5-325 MG per tablet  Commonly known as:  PERCOCET   1 tablet, Oral, Every 4 Hours PRN         Continue These Medications      Instructions Start Date   prenatal vitamin 27-0.8 27-0.8 MG tablet tablet   1 tablet, Oral, Daily             Discharge Disposition Home or Self Care   Condition on Discharge: good   Follow-up: 2 weeks with Sangita Joe DO  10/5/2018      Electronically signed by Lizz Joe DO at 10/5/2018 11:32 AM

## 2018-10-10 ENCOUNTER — POSTPARTUM VISIT (OUTPATIENT)
Dept: OBSTETRICS AND GYNECOLOGY | Facility: CLINIC | Age: 28
End: 2018-10-10

## 2018-10-10 VITALS
WEIGHT: 176 LBS | HEIGHT: 62 IN | BODY MASS INDEX: 32.39 KG/M2 | DIASTOLIC BLOOD PRESSURE: 80 MMHG | SYSTOLIC BLOOD PRESSURE: 124 MMHG

## 2018-10-10 PROCEDURE — 99024 POSTOP FOLLOW-UP VISIT: CPT | Performed by: OBSTETRICS & GYNECOLOGY

## 2018-10-10 NOTE — PROGRESS NOTES
"Subjective   Chief Complaint   Patient presents with   • Postpartum Care     pt here today for 1 week incision check. pt had baby boy named Rashard. pt is currently breast and bottle feeding. pt voices no other concerns.      Jennifer Wood is a 28 y.o. year old  presenting to be seen for her postpartum visit.  She had a Primary  (LTCS) 1 week ago.  The patient called and asked to be seen because she and her boyfriend are worried about how her incision looks.  The patient is, however, very anxious in general.  Baby is being discharged from NICU today; she has been staying in a hospitality room.  Pain is better every day; she is breaking pain pills in 1/2 for several days.   Prenatal course was been benign.    Since delivery she has not been sexually active.  She does not have concerns about post-partum blues/depression.   She is breastfeeding.    The following portions of the patient's history were reviewed and updated as appropriate:current medications and allergies    Smoking status: Former Smoker                                                              Packs/day: 0.00      Years: 0.00         Quit date: 2018  Smokeless tobacco: Never Used                        Review of Systems      Objective   /80   Ht 157.5 cm (62\")   Wt 79.8 kg (176 lb)   LMP 2017 (Exact Date)   BMI 32.19 kg/m²     General:  well developed; well nourished  no acute distress   Abdomen: soft, non-tender; no masses  incision is clean, dry and intact.  No induration or ecchymosis   Pelvis: Not performed.          Assessment   1. Normal 1 week postpartum exam  2. Patient worried about incision, but it looks fine     Plan   1. Reassurance given  2. RTO for regular 2 week visit that is already scheduled    No orders of the defined types were placed in this encounter.         This note was electronically signed.    Alice Quesada MD  October 10, 2018    "

## 2018-10-12 ENCOUNTER — HOSPITAL ENCOUNTER (OUTPATIENT)
Dept: LACTATION | Facility: HOSPITAL | Age: 28
Discharge: HOME | End: 2018-10-12

## 2018-10-12 NOTE — LACTATION NOTE
Infant's Name: Ernesto Osborne                                Infant's Date of Birth: 10/2/18     G/P  2/1    Gestational age at Birth:39-5   Infant's Age: 10 days  Infant's Physician: Dr. Campos                          Mom's Contact #:  [phone]    Reason for Visit: weight check, milk transfer check       Maternal Assessment:           Nipples: ( x )Protractile (  ) Inverted  (   ) Flat   (   ) Traumatized            Breast:   (  )Elastic     (   ) Inelastic   (  ) Soft Breasts   (   ) Softens with Feeds                             ( x  ) Easily Expresses Milk    (    ) Engorged    Maternal History:     BF other children? no     Ages? Miscarriage at 13 wks several yrs ago            Surgeries?   no                                           Low Milk Supply?    no                       Infant's Birth weight: 8-0.8   (  3650  gms)  Previous Weight: Date   10/9/18    Weight 8-3.4  (  3725 gms)              NICU admit for 8 days; respiratory issues.    Today's Weight: 8-5.8 (  3792 gms)               Feeding History Since Discharge/Last  Appt.: Mom has been feeding every 3 hrs day/night; 10 mins on each breast, then giving EBM per bottle 30 mls, then pumping for 10 mins, double electric Spectra. Obtains 40-90 mls per pumping session. Mother has supply of stored EBM; 30-60 bottle supplement feeds ahead.     Last 24 hours: Urines: 17   Stools: 7        Color of Stool: mustard, runny      Pre Weight with Diaper:    8-5.8   ( 3792 gms)            Minutes  Right/Left  Breast:    15/15  mins   (28 mls/16 mls)        Post Weight:    8-7.3    ( 3836 gms)      +44 mls                               Total Minutes:    30 mins          Total Weight Gain:       44     Gms/mls    Average Feeding Amount for Age: 60-90 mls/grams per feeding session.     Infant Assessment at Breast:   Appears:    (  ) Alert          (  x ) Sleepy                (   ) Irritable               Interested  (   )Great          (  x ) Sucks w/ Stim    (   )  Little Interest  Suckle:       (  x ) Coordinated (   ) Disorganized    (   )Tongue thrusts  (   )No Suckle    Tone:       ( x  ) Normal          (    ) Hypotonic          (    ) Tense-Hyperextends  Skin:          ( x ) Pink             (  ) Jaundice          Goals:   BF for 6 months. Continue to provide EBM for one year.      Interventions: Observed BFing session. Mother latched well with no assistance using shield. Taught Mary Anne reflex stimulation (baby sit ups) to prompt to continue to feed. Mother able to employ with no assistance. Dad offered Ernesto 30 mls EBM per bottle after BFing session. He drank 7 mls before completely refusing bottle.     Education:latch, shield, positioning for large breasts/, proper feed amounts, increasing milk supply, slowly decreasing pumping-only if desired, swallowing, waking techniques, EBM storage guidelines, [employer], wearing baby safely.    Feeding Plan:    Continue Bfing at first cues, but going no longer than 3 hours between start of one feed and start of next.  You may have one 4-hour interval at night between feeds.  Continue pumping as desired to have small supplement of EBM in bottle for after breast sessions or to store.  Use waking techniques (sit ups), compressions, and swallowing triggers while Ernesto at breast.    QUESTIONS:  Milk is good for one hour at room temp AFTER Ernesto's mouth has touch it.  Baby sit ups are best way to wake him to feed.  You may nurse 15 mins each breast at each session.  You may use EBM on diaper rash, air dry for a moment, then apply ointment.  Sleepiness at breast is very normal up until one month or so.  When Ernesto is 6-8 wks old or so, you may go longer between feeds; follow his cues.  All is well if he is gaining weight, urinating and stooling well.       Evaluation:   Interventions accomplished satisfactorily, requires no further action      Regular Follow Up Appointment  with MD office with Dr. Campos              Appointment  Date:10/15/18                 Time:    Signature: Cristela Harry IBCLC     Faxed to:  Dr. Campos   Date: 10/12/18     Time:12:30

## 2018-10-15 ENCOUNTER — POSTPARTUM VISIT (OUTPATIENT)
Dept: OBSTETRICS AND GYNECOLOGY | Facility: CLINIC | Age: 28
End: 2018-10-15

## 2018-10-15 VITALS
HEIGHT: 62 IN | BODY MASS INDEX: 31.28 KG/M2 | DIASTOLIC BLOOD PRESSURE: 82 MMHG | WEIGHT: 170 LBS | SYSTOLIC BLOOD PRESSURE: 100 MMHG

## 2018-10-15 DIAGNOSIS — Z09 POSTOP CHECK: Primary | ICD-10-CM

## 2018-10-15 PROCEDURE — 99024 POSTOP FOLLOW-UP VISIT: CPT | Performed by: OBSTETRICS & GYNECOLOGY

## 2018-10-15 NOTE — PROGRESS NOTES
"Jennifer Wood is a 28 y.o. female here today for incision check after undergoing  on 10/2.  She has been doing well since her discharge from the hospital and denies fevers, significant abdominal pain, nausea, or problems with her incision.  Her infant is breast-feeding well and she denies postpartum blues.    /82 (BP Location: Right arm, Patient Position: Sitting)   Ht 157.5 cm (62\")   Wt 77.1 kg (170 lb)   Breastfeeding? Yes   BMI 31.09 kg/m²   In general pleasant female no acute distress  Abdomen soft and nontender  Her incision is healing well without signs of infection    Assessment: Normal incision check after a     She will continue with light activities and pelvic rest.  She will followup in 4 weeks for a postpartum visit and call in the meantime if she has any questions or concerns.   "

## 2018-11-12 ENCOUNTER — POSTPARTUM VISIT (OUTPATIENT)
Dept: OBSTETRICS AND GYNECOLOGY | Facility: CLINIC | Age: 28
End: 2018-11-12

## 2018-11-12 VITALS
BODY MASS INDEX: 30 KG/M2 | HEIGHT: 62 IN | SYSTOLIC BLOOD PRESSURE: 115 MMHG | DIASTOLIC BLOOD PRESSURE: 82 MMHG | WEIGHT: 163 LBS

## 2018-11-12 RX ORDER — LEVONORGESTREL AND ETHINYL ESTRADIOL 0.1-0.02MG
1 KIT ORAL DAILY
Qty: 28 TABLET | Refills: 12 | Status: SHIPPED | OUTPATIENT
Start: 2018-11-12 | End: 2019-11-12

## 2018-11-12 NOTE — PROGRESS NOTES
"Jennifer Wood is here for a postpartum visit after a  6 weeks ago.  She has no signs of postpartum depression today.  She has not had a period since her delivery and is bottle and breast-feeding her infant without difficulty.  Her last Pap smear was 2018 and normal.  She has no history of cervical dysplasia.  She would like OCPs for contraception.    /82   Ht 157.5 cm (62\")   Wt 73.9 kg (163 lb)   BMI 29.81 kg/m²    In general pleasant female no acute distress  Neck no thyromegaly  Breasts without erythema tenderness or masses  Abdomen soft and nontender  A Pap smear was not performed.     Assessment: Normal postpartum exam.    We have discussed current Pap smear screening guidelines. I have given her a prescription for a birth control pill and we have discussed common side effects and adverse events. Jennifer will return in 1 year or sooner if needed.    "

## 2019-04-18 ENCOUNTER — OFFICE VISIT (OUTPATIENT)
Dept: OTOLARYNGOLOGY | Facility: CLINIC | Age: 29
End: 2019-04-18

## 2019-04-18 ENCOUNTER — PROCEDURE VISIT (OUTPATIENT)
Dept: OTOLARYNGOLOGY | Facility: CLINIC | Age: 29
End: 2019-04-18

## 2019-04-18 VITALS
TEMPERATURE: 98 F | SYSTOLIC BLOOD PRESSURE: 113 MMHG | DIASTOLIC BLOOD PRESSURE: 71 MMHG | HEIGHT: 62 IN | HEART RATE: 80 BPM | WEIGHT: 157 LBS | BODY MASS INDEX: 28.89 KG/M2 | RESPIRATION RATE: 20 BRPM

## 2019-04-18 DIAGNOSIS — J30.9 ALLERGIC RHINITIS, UNSPECIFIED SEASONALITY, UNSPECIFIED TRIGGER: ICD-10-CM

## 2019-04-18 DIAGNOSIS — H93.12 TINNITUS OF LEFT EAR: ICD-10-CM

## 2019-04-18 DIAGNOSIS — H69.82 DYSFUNCTION OF LEFT EUSTACHIAN TUBE: Primary | ICD-10-CM

## 2019-04-18 PROCEDURE — 92555 SPEECH THRESHOLD AUDIOMETRY: CPT | Performed by: AUDIOLOGIST-HEARING AID FITTER

## 2019-04-18 PROCEDURE — 99214 OFFICE O/P EST MOD 30 MIN: CPT | Performed by: NURSE PRACTITIONER

## 2019-04-18 PROCEDURE — 92570 ACOUSTIC IMMITANCE TESTING: CPT | Performed by: AUDIOLOGIST-HEARING AID FITTER

## 2019-04-18 PROCEDURE — 92551 PURE TONE HEARING TEST AIR: CPT | Performed by: AUDIOLOGIST-HEARING AID FITTER

## 2019-04-18 RX ORDER — LORAZEPAM 1 MG/1
1 TABLET ORAL EVERY 8 HOURS PRN
COMMUNITY
End: 2020-01-15

## 2019-04-18 NOTE — PROGRESS NOTES
PRIMARY CARE PROVIDER: Jennifer Howell MD  REFERRING PROVIDER: YVON Merchant    Chief Complaint   Patient presents with   • Ear Problem     Ear problems       Subjective   History of Present Illness:  Jennifer Wood is a  29 y.o. female who complains of clicking tinnitus. The symptoms are localized to the left ear. The patient has had moderate symptoms. The symptoms have been intermittant since February. Her symptoms began after going to a [employer] concert. She was also sick with an URI infection- she has had this frequently since October. She has also had some ear pressure and fullness. She feels her symptoms would improve if she could pop her ears. She has been treated with Claritin PRN without improvement in symptoms. She denies any otalgia, otorrhea, dizziness, vertigo, or change in hearing.       Review of Systems:  Review of Systems   Constitutional: Negative for chills and fever.   HENT: Positive for congestion. Negative for ear discharge, ear pain, hearing loss, sinus pressure, sinus pain, sneezing, trouble swallowing and voice change.    Respiratory: Negative for cough and shortness of breath.    Cardiovascular: Negative for chest pain.   Gastrointestinal: Negative for diarrhea, nausea and vomiting.   Allergic/Immunologic: Positive for environmental allergies.       Past History:  Past Medical History:   Diagnosis Date   • Anxiety    • Migraine    • Miscarriage    • Ovarian cyst      Past Surgical History:   Procedure Laterality Date   •  SECTION N/A 10/2/2018    Procedure:  SECTION PRIMARY;  Surgeon: Alice Quesada MD;  Location: [facility] LABOR DELIVERY;  Service: Obstetrics/Gynecology   • CYST REMOVAL      2018     Family History   Problem Relation Age of Onset   • Colon polyps Mother    • Hypertension Paternal Grandfather    • Hypertension Paternal Grandmother    • Hypertension Maternal Grandmother      Social History     Tobacco Use   • Smoking status: Former Smoker      Last attempt to quit: 2018     Years since quittin.2   • Smokeless tobacco: Never Used   Substance Use Topics   • Alcohol use: No   • Drug use: No     Allergies:  Septra [sulfamethoxazole-trimethoprim] and Sulfa antibiotics    Current Outpatient Medications:   •  levonorgestrel-ethinyl estradiol (AVIANE,ALESSE,LESSINA) 0.1-20 MG-MCG per tablet, Take 1 tablet by mouth Daily., Disp: 28 tablet, Rfl: 12  •  LORazepam (ATIVAN) 1 MG tablet, Take 1 mg by mouth Every 8 (Eight) Hours As Needed for Anxiety., Disp: , Rfl:   •  Prenatal Vit-Fe Fumarate-FA (PRENATAL VITAMIN 27-0.8) 27-0.8 MG tablet tablet, Take 1 tablet by mouth Daily., Disp: 60 tablet, Rfl: 4      Objective     Vital Signs:  Temp:  [98 °F (36.7 °C)] 98 °F (36.7 °C)  Heart Rate:  [80] 80  Resp:  [20] 20  BP: (113)/(71) 113/71    Physical Exam:  Physical Exam  CONSTITUTIONAL: well nourished, well-developed, alert, oriented, in no acute distress   COMMUNICATION AND VOICE: able to communicate normally, normal voice quality  HEAD: normocephalic, no lesions, atraumatic, no tenderness, no masses   FACE: appearance normal, no lesions, no tenderness, no deformities, facial motion symmetric  SALIVARY GLANDS: parotid glands with no tenderness, no swelling, no masses, submandibular glands with normal size, nontender  EYES: ocular motility normal, eyelids normal, orbits normal, no proptosis, conjunctiva normal , pupils equal, round   EARS:  Hearing: response to conversational voice normal bilaterally   External Ears: auricles without lesions  Otoscopic: tympanic membrane appearance normal, no lesions, no perforation, normal mobility, no fluid  NOSE:  External Nose: structure normal, no tenderness on palpation, no nasal discharge, no lesions, no evidence of trauma, nostrils patent   Intranasal Exam: nasal mucosa is very inflamed and appears allergic with mucosal stranding, vestibule within normal limits, inferior turbinate normal, nasal septum midline    ORAL:  Lips: upper and lower lips without lesion   Teeth: dentition within normal limits for age   Gums: gingivae healthy   Oral Mucosa: oral mucosa normal, no mucosal lesions   Floor of Mouth: Warthin’s duct patent, mucosa normal  Tongue: lingual mucosa normal without lesions, normal tongue mobility   Palate: soft and hard palates with normal mucosa and structure  Oropharynx: oropharyngeal mucosa normal  NECK: neck appearance normal, no masses or tenderness  LYMPH NODES: no lymphadenopathy  CHEST/RESPIRATORY: respiratory effort normal, normal breath sounds   CARDIOVASCULAR: rate and rhythm normal, extremities without cyanosis or edema    NEUROLOGIC/PSYCHIATRIC: oriented to time, place and person, mood normal, affect appropriate, CN II-XII intact grossly      Results Review:         Assessment   Assessment:  1. Dysfunction of left eustachian tube    2. Tinnitus of left ear    3. Allergic rhinitis, unspecified seasonality, unspecified trigger        Plan   Plan:    Start Flonase and Claritin- she has this at home. The proper use of nasal inhalers was discussed including the need for regular use and build up of drug levels before full effects. Call for ear drainage, ear pain, fever over 101, or hearing loss. Call for problems or worsening symptoms.     QUALITY MEASURES    Body Mass Index Screening and Follow-Up Plan  Body mass index is 28.72 kg/m².  Patient's Body mass index is 28.72 kg/m². BMI is above normal parameters. Recommendations include: referral to primary care.    Tobacco Use: Screening and Cessation Intervention  Social History    Tobacco Use      Smoking status: Former Smoker        Quit date: 2018        Years since quittin.2      Smokeless tobacco: Never Used      Return in about 6 weeks (around 2019), or if symptoms worsen or fail to improve, for Recheck.    My findings and recommendations were discussed and questions were answered.     Rupa Gardiner, YVON  19  2:32 PM

## 2019-05-12 ENCOUNTER — NURSE TRIAGE (OUTPATIENT)
Dept: CALL CENTER | Facility: HOSPITAL | Age: 29
End: 2019-05-12

## 2019-05-12 VITALS — WEIGHT: 158 LBS | BODY MASS INDEX: 28.9 KG/M2

## 2019-05-12 NOTE — TELEPHONE ENCOUNTER
Is having numbness of forehad and some times radiates, sen tto ER to be evaluated    Reason for Disposition  • Bell's palsy suspected (i.e., weakness on only one side of the face, developing over hours to days, no other symptoms)    Additional Information  • Negative: [1] SEVERE weakness (i.e., unable to walk or barely able to walk, requires support) AND [2] new onset or worsening  • Negative: [1] Weakness (i.e., paralysis, loss of muscle strength) of the face, arm / hand, or leg / foot on one side of the body AND [2] sudden onset AND [3] present now  • Negative: [1] Numbness (i.e., loss of sensation) of the face, arm / hand, or leg / foot on one side of the body AND [2] sudden onset AND [3] present now  • Negative: [1] Loss of speech or garbled speech AND [2] sudden onset AND [3] present now  • Negative: Difficult to awaken or acting confused (e.g., disoriented, slurred speech)  • Negative: Sounds like a life-threatening emergency to the triager  • Negative: Confusion, disorientation, or hallucinations is main symptom  • Negative: Neck pain is main symptom (and having weakness, numbness, or tingling in arm / hand because of neck pain)  • Negative: Back pain is main symptom (and having weakness, numbness, or tingling in leg because of back pain)  • Negative: Hand pain is main symptom (and having mild weakness, numbness, or tingling in hand related to hand pain)  • Negative: Dizziness is main symptom  • Negative: Vision loss or change is main symptom  • Negative: Followed a head injury within last 3 days  • Negative: Followed a neck injury within last 3 days  • Negative: [1] Tingling in both hands and/or feet AND [2] breathing faster than normal AND [3] feels similar to prior panic attack or hyperventilation episode  • Negative: Weakness in both sides of the body or weakness all over  • Negative: Headache  (and neurologic deficit)  • Negative: [1] Back pain AND [2] numbness (loss of sensation) in groin or rectal  "area  • Negative: [1] Unable to urinate (or only a few drops) > 4 hours AND [2] bladder feels very full (e.g., palpable bladder or strong urge to urinate)  • Negative: [1] Loss of control of bowel or bladder (i.e., incontinence) AND [2] new onset  • Negative: [1] Weakness (i.e., paralysis, loss of muscle strength) of the face, arm / hand, or leg / foot on one side of the body AND [2] sudden onset AND [3] brief (now gone)  • Negative: [1] Numbness (i.e., loss of sensation) of the face, arm / hand, or leg / foot on one side of the body AND [2] sudden onset AND [3] brief (now gone)  • Negative: [1] Loss of speech or garbled speech AND [2] sudden onset AND [3] brief (now gone)    Answer Assessment - Initial Assessment Questions  1. SYMPTOM: \"What is the main symptom you are concerned about?\" (e.g., weakness, numbness)      Numbness forehead comes and goes  2. ONSET: \"When did this start?\" (minutes, hours, days; while sleeping)       Week ago  3. LAST NORMAL: \"When was the last time you were normal (no symptoms)?\"      On off different days for a week  4. PATTERN \"Does this come and go, or has it been constant since it started?\"  \"Is it present now?\"      Comes and goes  5. CARDIAC SYMPTOMS: \"Have you had any of the following symptoms: chest pain, difficulty breathing, palpitations?\"      no  6. NEUROLOGIC SYMPTOMS: \"Have you had any of the following symptoms: headache, dizziness, vision loss, double vision, changes in speech, unsteady on your feet?\"      no  7. OTHER SYMPTOMS: \"Do you have any other symptoms?\"      Numbness forehead  8. PREGNANCY: \"Is there any chance you are pregnant?\" \"When was your last menstrual period?\"      na    Protocols used: NEUROLOGIC DEFICIT-ADULT-AH      "

## 2019-06-17 DIAGNOSIS — Z34.83 ENCOUNTER FOR SUPERVISION OF OTHER NORMAL PREGNANCY IN THIRD TRIMESTER: ICD-10-CM

## 2019-06-17 RX ORDER — PRENATAL VIT/IRON FUM/FOLIC AC 27MG-0.8MG
TABLET ORAL
Qty: 60 TABLET | Refills: 4 | OUTPATIENT
Start: 2019-06-17

## 2019-06-20 ENCOUNTER — OFFICE VISIT (OUTPATIENT)
Dept: OTOLARYNGOLOGY | Facility: CLINIC | Age: 29
End: 2019-06-20

## 2019-06-20 VITALS
RESPIRATION RATE: 20 BRPM | DIASTOLIC BLOOD PRESSURE: 79 MMHG | HEIGHT: 62 IN | SYSTOLIC BLOOD PRESSURE: 121 MMHG | HEART RATE: 80 BPM | BODY MASS INDEX: 28.89 KG/M2 | WEIGHT: 157 LBS | TEMPERATURE: 98 F

## 2019-06-20 DIAGNOSIS — J30.9 ALLERGIC RHINITIS, UNSPECIFIED SEASONALITY, UNSPECIFIED TRIGGER: ICD-10-CM

## 2019-06-20 DIAGNOSIS — H69.82 DYSFUNCTION OF LEFT EUSTACHIAN TUBE: Primary | ICD-10-CM

## 2019-06-20 DIAGNOSIS — H93.12 TINNITUS OF LEFT EAR: ICD-10-CM

## 2019-06-20 PROCEDURE — 99213 OFFICE O/P EST LOW 20 MIN: CPT | Performed by: NURSE PRACTITIONER

## 2019-06-20 RX ORDER — FLUTICASONE PROPIONATE 50 MCG
SPRAY, SUSPENSION (ML) NASAL
Refills: 2 | COMMUNITY
Start: 2019-03-21 | End: 2021-02-04

## 2019-06-20 RX ORDER — LORATADINE 10 MG/1
TABLET ORAL
Refills: 2 | COMMUNITY
Start: 2019-04-15 | End: 2021-02-04

## 2019-06-20 NOTE — PROGRESS NOTES
PRIMARY CARE PROVIDER: Jennifer Howell MD  REFERRING PROVIDER: No ref. provider found    Chief Complaint   Patient presents with   • Follow-up     Ears       Subjective   History of Present Illness:  Jennifer Wood is a  29 y.o. female who is here to follow-up from complaints of clicking tinnitus. The symptoms are localized to the left ear. The patient has had moderate symptoms. The symptoms have been resolved for the last several weeks. Her symptoms began after going to a [employer] concert. She was also sick with an URI infection. She has also had some ear pressure and fullness. She was treated with Flonase and Claritin with a complete resolution in symptoms. She denies any otalgia, otorrhea, dizziness, vertigo, or change in hearing.       Review of Systems:  Review of Systems   Constitutional: Negative for chills and fever.   HENT: Negative for congestion, ear discharge, ear pain, hearing loss, sinus pressure, sinus pain, sneezing, trouble swallowing and voice change.    Respiratory: Negative for cough and shortness of breath.    Cardiovascular: Negative for chest pain.   Gastrointestinal: Negative for diarrhea, nausea and vomiting.   Allergic/Immunologic: Positive for environmental allergies.       Past History:  Past Medical History:   Diagnosis Date   • Anxiety    • Migraine    • Miscarriage    • Ovarian cyst      Past Surgical History:   Procedure Laterality Date   •  SECTION N/A 10/2/2018    Procedure:  SECTION PRIMARY;  Surgeon: Alice Quesada MD;  Location: [facility] LABOR DELIVERY;  Service: Obstetrics/Gynecology   • CYST REMOVAL      2018     Family History   Problem Relation Age of Onset   • Colon polyps Mother    • Hypertension Paternal Grandfather    • Hypertension Paternal Grandmother    • Hypertension Maternal Grandmother      Social History     Tobacco Use   • Smoking status: Former Smoker     Last attempt to quit: 2018     Years since quittin.4   • Smokeless  tobacco: Never Used   Substance Use Topics   • Alcohol use: No   • Drug use: No     Allergies:  Septra [sulfamethoxazole-trimethoprim] and Sulfa antibiotics    Current Outpatient Medications:   •  fluticasone (FLONASE) 50 MCG/ACT nasal spray, , Disp: , Rfl: 2  •  levonorgestrel-ethinyl estradiol (AVIANE,ALESSE,LESSINA) 0.1-20 MG-MCG per tablet, Take 1 tablet by mouth Daily., Disp: 28 tablet, Rfl: 12  •  loratadine (CLARITIN) 10 MG tablet, , Disp: , Rfl: 2  •  LORazepam (ATIVAN) 1 MG tablet, Take 1 mg by mouth Every 8 (Eight) Hours As Needed for Anxiety., Disp: , Rfl:   •  Prenatal Vit-Fe Fumarate-FA (PRENATAL VITAMIN 27-0.8) 27-0.8 MG tablet tablet, Take 1 tablet by mouth Daily., Disp: 60 tablet, Rfl: 4      Objective     Vital Signs:  Temp:  [98 °F (36.7 °C)] 98 °F (36.7 °C)  Heart Rate:  [80] 80  Resp:  [20] 20  BP: (121)/(79) 121/79    Physical Exam:  Physical Exam  CONSTITUTIONAL: well nourished, well-developed, alert, oriented, in no acute distress   COMMUNICATION AND VOICE: able to communicate normally, normal voice quality  HEAD: normocephalic, no lesions, atraumatic, no tenderness, no masses   FACE: appearance normal, no lesions, no tenderness, no deformities, facial motion symmetric  SALIVARY GLANDS: parotid glands with no tenderness, no swelling, no masses, submandibular glands with normal size, nontender  EYES: ocular motility normal, eyelids normal, orbits normal, no proptosis, conjunctiva normal , pupils equal, round   EARS:  Hearing: response to conversational voice normal bilaterally   External Ears: auricles without lesions  Otoscopic: tympanic membrane appearance normal, no lesions, no perforation, normal mobility, no fluid  NOSE:  External Nose: structure normal, no tenderness on palpation, no nasal discharge, no lesions, no evidence of trauma, nostrils patent   Intranasal Exam: nasal mucosa is mildly inflamed, vestibule within normal limits, inferior turbinate normal, nasal septum midline    ORAL:  Lips: upper and lower lips without lesion   Teeth: dentition within normal limits for age   Gums: gingivae healthy   Oral Mucosa: oral mucosa normal, no mucosal lesions   Floor of Mouth: Warthin’s duct patent, mucosa normal  Tongue: lingual mucosa normal without lesions, normal tongue mobility   Palate: soft and hard palates with normal mucosa and structure  Oropharynx: oropharyngeal mucosa normal  NECK: neck appearance normal, no masses or tenderness  LYMPH NODES: no lymphadenopathy  CHEST/RESPIRATORY: respiratory effort normal, normal breath sounds   CARDIOVASCULAR: rate and rhythm normal, extremities without cyanosis or edema    NEUROLOGIC/PSYCHIATRIC: oriented to time, place and person, mood normal, affect appropriate, CN II-XII intact grossly      Results Review:         Assessment   Assessment:  1. Dysfunction of left eustachian tube    2. Tinnitus of left ear    3. Allergic rhinitis, unspecified seasonality, unspecified trigger        Plan   Plan:    Her symptoms have resolved. Call for ear drainage, ear pain, fever over 101, or hearing loss. Call for problems or worsening symptoms. Follow-up PRN.    QUALITY MEASURES    Body Mass Index Screening and Follow-Up Plan  Body mass index is 28.72 kg/m².  Patient's Body mass index is 28.72 kg/m². BMI is above normal parameters. Recommendations include: referral to primary care.    Tobacco Use: Screening and Cessation Intervention  Social History    Tobacco Use      Smoking status: Former Smoker        Quit date: 2018        Years since quittin.4      Smokeless tobacco: Never Used      Return if symptoms worsen or fail to improve, for Recheck.    My findings and recommendations were discussed and questions were answered.     Rupa Gardiner, YVON  19  3:33 PM

## 2020-01-15 ENCOUNTER — OFFICE VISIT (OUTPATIENT)
Dept: OBSTETRICS AND GYNECOLOGY | Facility: CLINIC | Age: 30
End: 2020-01-15

## 2020-01-15 VITALS
DIASTOLIC BLOOD PRESSURE: 72 MMHG | SYSTOLIC BLOOD PRESSURE: 116 MMHG | BODY MASS INDEX: 29.08 KG/M2 | WEIGHT: 158 LBS | HEIGHT: 62 IN

## 2020-01-15 DIAGNOSIS — F17.210 CIGARETTE SMOKER: ICD-10-CM

## 2020-01-15 DIAGNOSIS — Z01.419 WELL WOMAN EXAM WITH ROUTINE GYNECOLOGICAL EXAM: Primary | ICD-10-CM

## 2020-01-15 DIAGNOSIS — Z30.011 ENCOUNTER FOR INITIAL PRESCRIPTION OF CONTRACEPTIVE PILLS: ICD-10-CM

## 2020-01-15 PROCEDURE — 99395 PREV VISIT EST AGE 18-39: CPT | Performed by: OBSTETRICS & GYNECOLOGY

## 2020-01-15 PROCEDURE — G0123 SCREEN CERV/VAG THIN LAYER: HCPCS | Performed by: OBSTETRICS & GYNECOLOGY

## 2020-01-15 RX ORDER — NORETHINDRONE 0.35 MG/1
1 TABLET ORAL DAILY
COMMUNITY
Start: 2020-01-04 | End: 2020-01-15

## 2020-01-15 RX ORDER — NORGESTIMATE AND ETHINYL ESTRADIOL 7DAYSX3 28
1 KIT ORAL DAILY
Qty: 28 TABLET | Refills: 12 | Status: SHIPPED | OUTPATIENT
Start: 2020-01-15 | End: 2021-01-04

## 2020-01-15 RX ORDER — VALACYCLOVIR HYDROCHLORIDE 1 G/1
TABLET, FILM COATED ORAL
COMMUNITY
Start: 2019-12-27

## 2020-01-15 RX ORDER — LORAZEPAM 0.5 MG/1
TABLET ORAL
COMMUNITY
Start: 2020-01-03 | End: 2022-12-07

## 2020-01-15 NOTE — PROGRESS NOTES
"Subjective      Jennifer Wood is a 29 y.o. female who presents for her routine annual exam. Overall, patient feels \"good\".  Periods have just recently returned after a year of breastfeeding, but she reports that the first one lasted 5 days and felt normal.  Patient still nursing at night, and reports baby not ready to wean.    Current contraception: oral progesterone-only contraceptive  Regular self breast exam: yes, but it is hard to tell what it going on since she is still nursing  History of abnormal Pap smear: maybe.  Patient responds yes, but then says that it was PID  History of abnormal mammogram: N/A  Exercise: occasionally    Menstrual History:  OB History        2    Para   1    Term   1            AB   1    Living   1       SAB   1    TAB        Ectopic        Molar        Multiple   0    Live Births   1                 Patient's last menstrual period was 2020 (exact date).       The following portions of the patient's history were reviewed and updated as appropriate: allergies, current medications, past family history, past medical history, past social history, past surgical history and problem list.    heterosexual    Family History  Family History   Problem Relation Age of Onset   • Colon polyps Mother    • Hypertension Paternal Grandfather    • Hypertension Paternal Grandmother    • Hypertension Maternal Grandmother    • Breast cancer Maternal Aunt    • Brain cancer Paternal Uncle        Review of Systems  Review of Systems   Constitutional: Negative for activity change and unexpected weight loss.   HENT: Negative for congestion.    Respiratory: Positive for shortness of breath (only because she has not been doing enough activity).    Cardiovascular: Negative for chest pain.   Gastrointestinal: Negative for abdominal pain, blood in stool, constipation and diarrhea.   Endocrine: Negative for cold intolerance and heat intolerance.   Genitourinary: Positive for decreased libido (still " "breastfeeding) and dyspareunia (sometimes, due to dryness from breastfeeding). Negative for menstrual problem, pelvic pain, vaginal discharge and vaginal pain.   Musculoskeletal: Positive for back pain. Negative for arthralgias, neck pain and neck stiffness.   Skin: Positive for rash (developed rosacea after delivering first child).   Neurological: Negative for dizziness and headache.   Psychiatric/Behavioral: Positive for sleep disturbance (baby, still breastfeeding). The patient is nervous/anxious (occasional panic attacks, but feels good most of the time).              Objective        /72   Ht 157.5 cm (62\")   Wt 71.7 kg (158 lb)   LMP 01/02/2020 (Exact Date)   Breastfeeding Yes   BMI 28.90 kg/m²   Physical Exam   Constitutional: She is oriented to person, place, and time. She appears well-developed and well-nourished. No distress.   HENT:   Head: Normocephalic and atraumatic.   Eyes: EOM are normal.   Neck: Normal range of motion. Neck supple.   Cardiovascular: Normal rate and regular rhythm.   No murmur heard.  Pulmonary/Chest: Effort normal and breath sounds normal. Right breast exhibits no inverted nipple and no mass. Left breast exhibits no inverted nipple and no mass. No breast tenderness or discharge.   Abdominal: Soft. She exhibits no distension. There is no tenderness.   Genitourinary: Vagina normal and uterus normal. Pelvic exam was performed with patient supine. There is no tenderness or lesion on the right labia. There is no tenderness or lesion on the left labia. Cervix does not exhibit motion tenderness, discharge or friability. Right adnexum displays no tenderness and no fullness. Left adnexum displays no tenderness and no fullness. No tenderness or bleeding in the vagina. No vaginal discharge found.   Musculoskeletal: Normal range of motion. She exhibits no edema.   Neurological: She is alert and oriented to person, place, and time.   Skin: Skin is warm and dry.   Psychiatric: She has " a normal mood and affect. Her behavior is normal. Judgment normal.   Nursing note and vitals reviewed.            Assessment  & Plan    Jennifer was seen today for gynecologic exam.    Diagnoses and all orders for this visit:    Well woman exam with routine gynecological exam: Exam unremarkable.  Patient did resume smoking after she delivered; cessation encouraged.  Routine screening labs not indicated.  PAP collected.  Patient ready to wean from breastfeeding - switching from mini-pill to Tri-Sprintec that she has used in the past and liked.  Also discussed benadryl qhs to help decrease supply.  RTO in 1 year, or prn.  -     Liquid-based Pap Smear, Screening    Cigarette smoker    BMI 28.0-28.9,adult    Encounter for initial prescription of contraceptive pills  -     norgestimate-ethinyl estradiol (TRI-SPRINTEC) 0.18/0.215/0.25 MG-35 MCG per tablet; Take 1 tablet by mouth Daily.        This note was electronically signed.    Alice Quesada MD  1/15/2020  11:34 AM

## 2020-01-16 ENCOUNTER — TELEPHONE (OUTPATIENT)
Dept: OBSTETRICS AND GYNECOLOGY | Facility: CLINIC | Age: 30
End: 2020-01-16

## 2020-01-16 DIAGNOSIS — Z34.83 ENCOUNTER FOR SUPERVISION OF OTHER NORMAL PREGNANCY IN THIRD TRIMESTER: ICD-10-CM

## 2020-01-16 LAB
GEN CATEG CVX/VAG CYTO-IMP: NORMAL
LAB AP CASE REPORT: NORMAL
LAB AP GYN ADDITIONAL INFORMATION: NORMAL
PATH INTERP SPEC-IMP: NORMAL
STAT OF ADQ CVX/VAG CYTO-IMP: NORMAL

## 2020-01-16 RX ORDER — PRENATAL VIT/IRON FUM/FOLIC AC 27MG-0.8MG
1 TABLET ORAL DAILY
Qty: 60 TABLET | Refills: 4 | Status: SHIPPED | OUTPATIENT
Start: 2020-01-16

## 2021-01-03 DIAGNOSIS — Z30.011 ENCOUNTER FOR INITIAL PRESCRIPTION OF CONTRACEPTIVE PILLS: ICD-10-CM

## 2021-01-04 RX ORDER — NORGESTIMATE AND ETHINYL ESTRADIOL 7DAYSX3 28
KIT ORAL
Qty: 28 TABLET | Refills: 0 | Status: SHIPPED | OUTPATIENT
Start: 2021-01-04 | End: 2021-02-04 | Stop reason: SDUPTHER

## 2021-02-04 ENCOUNTER — OFFICE VISIT (OUTPATIENT)
Dept: OBSTETRICS AND GYNECOLOGY | Facility: CLINIC | Age: 31
End: 2021-02-04

## 2021-02-04 VITALS
BODY MASS INDEX: 33.13 KG/M2 | WEIGHT: 180 LBS | DIASTOLIC BLOOD PRESSURE: 76 MMHG | SYSTOLIC BLOOD PRESSURE: 122 MMHG | HEIGHT: 62 IN

## 2021-02-04 DIAGNOSIS — F17.210 CIGARETTE SMOKER: ICD-10-CM

## 2021-02-04 DIAGNOSIS — Z30.011 ENCOUNTER FOR INITIAL PRESCRIPTION OF CONTRACEPTIVE PILLS: ICD-10-CM

## 2021-02-04 DIAGNOSIS — Z01.419 WELL WOMAN EXAM WITH ROUTINE GYNECOLOGICAL EXAM: Primary | ICD-10-CM

## 2021-02-04 DIAGNOSIS — Z30.41 ENCOUNTER FOR SURVEILLANCE OF CONTRACEPTIVE PILLS: ICD-10-CM

## 2021-02-04 PROCEDURE — 99395 PREV VISIT EST AGE 18-39: CPT | Performed by: OBSTETRICS & GYNECOLOGY

## 2021-02-04 RX ORDER — NORGESTIMATE AND ETHINYL ESTRADIOL 7DAYSX3 28
1 KIT ORAL DAILY
Qty: 28 TABLET | Refills: 12 | Status: SHIPPED | OUTPATIENT
Start: 2021-02-04 | End: 2021-12-15

## 2021-02-04 NOTE — PROGRESS NOTES
"Subjective      Jennifer Wood is a 30 y.o. female who presents for her routine annual exam. Overall, patient feels \"good\".  She reports that ever since her C/S she has occasional spasms in the muscles of her lower abdomen.  Pain only lasts for about 30 seconds, but can occur with sex or at rest.  Patient reports a crampy, \"blah\" feeling in her pelvis \"like she is on her period\" - for the past couple of months, even when she is not bleeding.  Her PCP recently ordered a pelvic u/s because of this complaint, but that was normal.  Periods are regular in timing while taking OCP's.    Current contraception: OCP (estrogen/progesterone).  Patient hoping to get pregnant again later this year  Regular self breast exam: \"not really\"  History of abnormal Pap smear: maybe.  Patient responds yes, but then says that it was PID.  No h/o abnormal PAP since she was a teen.  History of abnormal mammogram: N/A  Exercise: occasionally    Menstrual History:  OB History        2    Para   1    Term   1            AB   1    Living   1       SAB   1    TAB        Ectopic        Molar        Multiple   0    Live Births   1                 Patient's last menstrual period was 2021 (exact date).       The following portions of the patient's history were reviewed and updated as appropriate: allergies, current medications, past family history, past medical history, past social history, past surgical history and problem list.    heterosexual    Family History  Family History   Problem Relation Age of Onset   • Colon polyps Mother    • Hypertension Paternal Grandfather    • Hypertension Paternal Grandmother    • Hypertension Maternal Grandmother    • Breast cancer Maternal Aunt    • Brain cancer Paternal Uncle        Review of Systems  Review of Systems   Constitutional: Negative for activity change and unexpected weight loss.   HENT: Negative for congestion.    Respiratory: Negative for shortness of breath.    Cardiovascular: " "Negative for chest pain.   Gastrointestinal: Negative for abdominal pain, blood in stool, constipation and diarrhea.   Endocrine: Negative for cold intolerance and heat intolerance.   Genitourinary: Positive for decreased libido, dyspareunia (sometimes) and pelvic pain (sharp, only 30 seconds, about 1-2X/month). Negative for breast pain, difficulty urinating, dysuria, menstrual problem, urinary incontinence, vaginal discharge and vaginal pain.   Musculoskeletal: Positive for arthralgias (knee pain is new, thinks it is because she has gained weight) and back pain (chronic problem). Negative for neck pain and neck stiffness.   Skin: Positive for rash (developed rosacea after delivering first child).   Neurological: Negative for dizziness and headache.   Psychiatric/Behavioral: Positive for sleep disturbance. Negative for depressed mood. The patient is nervous/anxious (occasional panic attacks, but feels good most of the time.  Takes CBD oil every morning).              Objective        /76   Ht 157.5 cm (62\")   Wt 81.6 kg (180 lb)   LMP 01/28/2021 (Exact Date)   BMI 32.92 kg/m²   Physical Exam   Constitutional: She is oriented to person, place, and time. She appears well-developed and well-nourished. No distress.   HENT:   Head: Normocephalic and atraumatic.   Neck: Normal range of motion. Neck supple.   Cardiovascular: Normal rate and regular rhythm.   No murmur heard.  Pulmonary/Chest: Effort normal and breath sounds normal. Right breast exhibits no inverted nipple and no mass. Left breast exhibits no inverted nipple and no mass. No breast tenderness or discharge.   Abdominal: Soft. She exhibits no distension. There is no abdominal tenderness.   Genitourinary: Vagina normal and uterus normal. Pelvic exam was performed with patient supine. There is no tenderness or lesion on the right labia. There is no tenderness or lesion on the left labia. Cervix does not exhibit motion tenderness, discharge or " friability. Right adnexum displays no tenderness and no fullness. Left adnexum displays no tenderness and no fullness. No tenderness or bleeding in the vagina. No vaginal discharge found.   Musculoskeletal: Normal range of motion.   Neurological: She is alert and oriented to person, place, and time.   Skin: Skin is warm and dry.   Psychiatric: Her behavior is normal. Judgment normal.   Nursing note and vitals reviewed.            Assessment  & Plan    Jennifer was seen today for gynecologic exam.    Diagnoses and all orders for this visit:    1. Well woman exam with routine gynecological exam (Primary): Exam unremarkable.  Although patient reports a full feeling in her pelvis for the past few months, there is no tenderness or fullness on exam.  Pap collected; STD testing added with patient's consent.  Regular self breast exam encouraged, and technique reviewed during exam.  Routine screening labs not indicated.  Patient hoping to become pregnant again later this year, so smoking cessation discussed  -     Liquid-based Pap Smear, Screening  -     Trichomonas vaginalis, PCR - ThinPrep Vial, Cervix  -     HPV DNA Probe, Direct - ThinPrep Vial, Cervix  -     Chlamydia trachomatis, Neisseria gonorrhoeae, PCR - ThinPrep Vial, Cervix    2. BMI 32.0-32.9,adult: Exercise encouraged    3. Cigarette smoker: Cessation encouraged, especially since patient is hoping to be pregnant later this year    4. Encounter for surveillance of contraceptive pills: Patient pleased with current OCP and wants to continue.  Prescription sent    5. Encounter for initial prescription of contraceptive pills  -     norgestimate-ethinyl estradiol (Tri-Sprintec) 0.18/0.215/0.25 MG-35 MCG per tablet; Take 1 tablet by mouth Daily.  Dispense: 28 tablet; Refill: 12      This note was electronically signed.    Alice Quesada MD  2/4/2021  11:01 CST

## 2021-02-05 PROCEDURE — 87491 CHLMYD TRACH DNA AMP PROBE: CPT | Performed by: OBSTETRICS & GYNECOLOGY

## 2021-02-05 PROCEDURE — 87591 N.GONORRHOEAE DNA AMP PROB: CPT | Performed by: OBSTETRICS & GYNECOLOGY

## 2021-02-05 PROCEDURE — 87661 TRICHOMONAS VAGINALIS AMPLIF: CPT | Performed by: OBSTETRICS & GYNECOLOGY

## 2021-02-05 PROCEDURE — 87624 HPV HI-RISK TYP POOLED RSLT: CPT | Performed by: OBSTETRICS & GYNECOLOGY

## 2021-02-09 LAB
C TRACH RRNA CVX QL NAA+PROBE: NOT DETECTED
GEN CATEG CVX/VAG CYTO-IMP: NORMAL
HPV I/H RISK 4 DNA CVX QL PROBE+SIG AMP: NOT DETECTED
LAB AP CASE REPORT: NORMAL
LAB AP GYN ADDITIONAL INFORMATION: NORMAL
N GONORRHOEA RRNA SPEC QL NAA+PROBE: NOT DETECTED
PATH INTERP SPEC-IMP: NORMAL
STAT OF ADQ CVX/VAG CYTO-IMP: NORMAL
TRICHOMONAS VAGINALIS PCR: NOT DETECTED

## 2021-06-28 DIAGNOSIS — Z30.011 ENCOUNTER FOR INITIAL PRESCRIPTION OF CONTRACEPTIVE PILLS: ICD-10-CM

## 2021-07-09 RX ORDER — NORGESTIMATE AND ETHINYL ESTRADIOL 7DAYSX3 28
KIT ORAL
Qty: 28 TABLET | Refills: 0 | OUTPATIENT
Start: 2021-07-09

## 2021-12-09 DIAGNOSIS — Z30.011 ENCOUNTER FOR INITIAL PRESCRIPTION OF CONTRACEPTIVE PILLS: ICD-10-CM

## 2021-12-09 RX ORDER — NORGESTIMATE AND ETHINYL ESTRADIOL 7DAYSX3 28
KIT ORAL
Qty: 28 TABLET | Refills: 0 | OUTPATIENT
Start: 2021-12-09

## 2021-12-14 DIAGNOSIS — Z30.011 ENCOUNTER FOR INITIAL PRESCRIPTION OF CONTRACEPTIVE PILLS: ICD-10-CM

## 2021-12-15 DIAGNOSIS — Z30.011 ENCOUNTER FOR INITIAL PRESCRIPTION OF CONTRACEPTIVE PILLS: ICD-10-CM

## 2021-12-15 RX ORDER — NORGESTIMATE AND ETHINYL ESTRADIOL 7DAYSX3 28
KIT ORAL
Qty: 28 TABLET | Refills: 2 | Status: SHIPPED | OUTPATIENT
Start: 2021-12-15 | End: 2022-02-09 | Stop reason: SDUPTHER

## 2021-12-15 RX ORDER — NORGESTIMATE AND ETHINYL ESTRADIOL 7DAYSX3 28
KIT ORAL
Qty: 28 TABLET | Refills: 0 | OUTPATIENT
Start: 2021-12-15

## 2022-02-09 ENCOUNTER — OFFICE VISIT (OUTPATIENT)
Dept: OBSTETRICS AND GYNECOLOGY | Facility: CLINIC | Age: 32
End: 2022-02-09

## 2022-02-09 VITALS
BODY MASS INDEX: 27.97 KG/M2 | DIASTOLIC BLOOD PRESSURE: 82 MMHG | HEIGHT: 62 IN | WEIGHT: 152 LBS | SYSTOLIC BLOOD PRESSURE: 116 MMHG

## 2022-02-09 DIAGNOSIS — Z20.2 POSSIBLE EXPOSURE TO STD: ICD-10-CM

## 2022-02-09 DIAGNOSIS — Z01.419 WELL WOMAN EXAM WITH ROUTINE GYNECOLOGICAL EXAM: Primary | ICD-10-CM

## 2022-02-09 DIAGNOSIS — Z30.41 ENCOUNTER FOR SURVEILLANCE OF CONTRACEPTIVE PILLS: ICD-10-CM

## 2022-02-09 DIAGNOSIS — F17.210 CIGARETTE SMOKER: ICD-10-CM

## 2022-02-09 DIAGNOSIS — Z30.011 ENCOUNTER FOR INITIAL PRESCRIPTION OF CONTRACEPTIVE PILLS: ICD-10-CM

## 2022-02-09 PROCEDURE — G0123 SCREEN CERV/VAG THIN LAYER: HCPCS | Performed by: OBSTETRICS & GYNECOLOGY

## 2022-02-09 PROCEDURE — 87591 N.GONORRHOEAE DNA AMP PROB: CPT | Performed by: OBSTETRICS & GYNECOLOGY

## 2022-02-09 PROCEDURE — 87624 HPV HI-RISK TYP POOLED RSLT: CPT | Performed by: OBSTETRICS & GYNECOLOGY

## 2022-02-09 PROCEDURE — 3008F BODY MASS INDEX DOCD: CPT | Performed by: OBSTETRICS & GYNECOLOGY

## 2022-02-09 PROCEDURE — 99395 PREV VISIT EST AGE 18-39: CPT | Performed by: OBSTETRICS & GYNECOLOGY

## 2022-02-09 PROCEDURE — 87529 HSV DNA AMP PROBE: CPT | Performed by: OBSTETRICS & GYNECOLOGY

## 2022-02-09 PROCEDURE — 87625 HPV TYPES 16 & 18 ONLY: CPT | Performed by: OBSTETRICS & GYNECOLOGY

## 2022-02-09 PROCEDURE — 2014F MENTAL STATUS ASSESS: CPT | Performed by: OBSTETRICS & GYNECOLOGY

## 2022-02-09 PROCEDURE — 87661 TRICHOMONAS VAGINALIS AMPLIF: CPT | Performed by: OBSTETRICS & GYNECOLOGY

## 2022-02-09 PROCEDURE — 87491 CHLMYD TRACH DNA AMP PROBE: CPT | Performed by: OBSTETRICS & GYNECOLOGY

## 2022-02-09 RX ORDER — LORATADINE 10 MG/1
TABLET ORAL
COMMUNITY

## 2022-02-09 RX ORDER — ONDANSETRON 4 MG/1
TABLET, FILM COATED ORAL
COMMUNITY
Start: 2021-11-26 | End: 2023-02-09

## 2022-02-09 RX ORDER — DICYCLOMINE HCL 20 MG
TABLET ORAL AS NEEDED
COMMUNITY
Start: 2021-10-15 | End: 2022-10-03

## 2022-02-09 RX ORDER — NORGESTIMATE AND ETHINYL ESTRADIOL 7DAYSX3 28
1 KIT ORAL DAILY
Qty: 28 TABLET | Refills: 12 | Status: SHIPPED | OUTPATIENT
Start: 2022-02-09 | End: 2022-08-22

## 2022-02-09 NOTE — PROGRESS NOTES
Subjective      Jennifer Wood is a 31 y.o. female who presents for her routine annual exam. Overall, patient reports to be feeling fine.  Periods are regular every 28-30 days on OCP's, with no intermenstrual bleeding, spotting, or discharge.  Patient reports that for last several months they have been very light.    Current contraception: OCP (estrogen/progesterone)  Regular self breast exam: yes.  No concerns.  History of abnormal Pap smear: yes - in her teens.  All recent PAP's have been normal  History of abnormal mammogram: N/A  Exercise: rarely    The following portions of the patient's history were reviewed and updated as appropriate: allergies, current medications, past family history, past medical history, past social history, past surgical history and problem list.    heterosexual    Family History  Family History   Problem Relation Age of Onset   • Colon polyps Mother    • Hypertension Paternal Grandfather    • Hypertension Paternal Grandmother    • Hypertension Maternal Grandmother    • Breast cancer Maternal Aunt    • Brain cancer Paternal Uncle        Review of Systems  Review of Systems   Constitutional: Negative for activity change and unexpected weight loss.   HENT: Negative for congestion.    Respiratory: Negative for shortness of breath.    Cardiovascular: Negative for chest pain.   Gastrointestinal: Positive for diarrhea (since June 2021.  Pt seeing GI and has colonoscopy with endoscopy scheduled for next week). Negative for abdominal pain, blood in stool and constipation.   Endocrine: Negative for cold intolerance and heat intolerance.   Genitourinary: Positive for dyspareunia (only when they have sex very frequently). Negative for breast lump, breast pain, pelvic pain and vaginal discharge.   Musculoskeletal: Negative for arthralgias, back pain, neck pain and neck stiffness.   Skin: Negative for rash.   Neurological: Negative for dizziness and headache.   Psychiatric/Behavioral: Negative for  "sleep disturbance. The patient is not nervous/anxious.              Objective        /82   Ht 157.5 cm (62\")   Wt 68.9 kg (152 lb)   LMP 02/05/2022 (Exact Date)   BMI 27.80 kg/m²   Physical Exam  Vitals and nursing note reviewed. Exam conducted with a chaperone present.   Constitutional:       General: She is not in acute distress.     Appearance: She is well-developed.   HENT:      Head: Normocephalic and atraumatic.   Cardiovascular:      Rate and Rhythm: Normal rate and regular rhythm.      Heart sounds: No murmur heard.      Pulmonary:      Effort: Pulmonary effort is normal.      Breath sounds: Normal breath sounds.   Chest:   Breasts:      Right: No inverted nipple or mass.      Left: No inverted nipple or mass.       Abdominal:      General: There is no distension.      Palpations: Abdomen is soft.      Tenderness: There is no abdominal tenderness.   Genitourinary:     General: Normal vulva.      Exam position: Lithotomy position.      Labia:         Right: No tenderness or lesion.         Left: No tenderness or lesion.       Vagina: Normal. No vaginal discharge, tenderness or bleeding.      Cervix: No cervical motion tenderness, discharge or friability.      Adnexa:         Right: No tenderness or fullness.          Left: No tenderness or fullness.     Musculoskeletal:         General: Normal range of motion.      Cervical back: Normal range of motion and neck supple.   Skin:     General: Skin is warm and dry.   Neurological:      Mental Status: She is alert and oriented to person, place, and time.   Psychiatric:         Behavior: Behavior normal.         Judgment: Judgment normal.               Assessment  & Plan    Diagnoses and all orders for this visit:    1. Well woman exam with routine gynecological exam (Primary): Exam unremarkable.  PAP collected and STD added at patient's request.  Regular SBE encouraged.  Routine screening labs not indicated.  -     Liquid-based Pap Smear, Screening    2. " Possible exposure to STD  -     Hepatitis Panel, Acute  -     RPR  -     HIV-1 / O / 2 Ag / Antibody 4th Generation    3. Encounter for initial prescription of contraceptive pills: Patient pleased with current OCP's and the light menstrual cycles that they have resulted in    4. Encounter for surveillance of contraceptive pills  -     norgestimate-ethinyl estradiol (Tri-Sprintec) 0.18/0.215/0.25 MG-35 MCG per tablet; Take 1 tablet by mouth Daily.  Dispense: 28 tablet; Refill: 12    5. Cigarette smoker: Cessation encouraged.          This note was electronically signed.    Alice Quesada MD  2/9/2022  11:10 CST

## 2022-02-10 LAB
C TRACH RRNA CVX QL NAA+PROBE: NOT DETECTED
N GONORRHOEA RRNA SPEC QL NAA+PROBE: NOT DETECTED
TRICHOMONAS VAGINALIS PCR: NOT DETECTED

## 2022-02-11 LAB
HAV IGM SERPL QL IA: NEGATIVE
HBV CORE IGM SERPL QL IA: NEGATIVE
HBV SURFACE AG SERPL QL IA: NEGATIVE
HCV AB S/CO SERPL IA: 0.4 S/CO RATIO (ref 0–0.9)
HIV 1+2 AB+HIV1 P24 AG SERPL QL IA: NON REACTIVE
RPR SER QL: NON REACTIVE

## 2022-02-14 LAB
GEN CATEG CVX/VAG CYTO-IMP: ABNORMAL
HPV I/H RISK 4 DNA CVX QL PROBE+SIG AMP: DETECTED
HPV16 DNA SPEC QL NAA+PROBE: NOT DETECTED
HPV18+45 E6+E7 MRNA CVX QL NAA+PROBE: NOT DETECTED
HSV1 DNA SPEC QL NAA+PROBE: NOT DETECTED
HSV2 DNA SPEC QL NAA+PROBE: NOT DETECTED
LAB AP CASE REPORT: ABNORMAL
LAB AP GYN ADDITIONAL INFORMATION: ABNORMAL
LAB AP GYN OTHER FINDINGS: ABNORMAL
PATH INTERP SPEC-IMP: ABNORMAL
STAT OF ADQ CVX/VAG CYTO-IMP: ABNORMAL

## 2022-08-22 ENCOUNTER — OFFICE VISIT (OUTPATIENT)
Dept: OBSTETRICS AND GYNECOLOGY | Facility: CLINIC | Age: 32
End: 2022-08-22

## 2022-08-22 VITALS
HEIGHT: 62 IN | BODY MASS INDEX: 27.05 KG/M2 | SYSTOLIC BLOOD PRESSURE: 112 MMHG | WEIGHT: 147 LBS | DIASTOLIC BLOOD PRESSURE: 78 MMHG

## 2022-08-22 DIAGNOSIS — R87.610 ATYPICAL SQUAMOUS CELL CHANGES OF UNDETERMINED SIGNIFICANCE (ASCUS) ON CERVICAL CYTOLOGY WITH POSITIVE HIGH RISK HUMAN PAPILLOMA VIRUS (HPV): Primary | ICD-10-CM

## 2022-08-22 DIAGNOSIS — Z78.9 NON-SMOKER: ICD-10-CM

## 2022-08-22 DIAGNOSIS — R87.810 ATYPICAL SQUAMOUS CELL CHANGES OF UNDETERMINED SIGNIFICANCE (ASCUS) ON CERVICAL CYTOLOGY WITH POSITIVE HIGH RISK HUMAN PAPILLOMA VIRUS (HPV): Primary | ICD-10-CM

## 2022-08-22 LAB
B-HCG UR QL: NEGATIVE
EXPIRATION DATE: NORMAL
INTERNAL NEGATIVE CONTROL: NEGATIVE
INTERNAL POSITIVE CONTROL: POSITIVE
Lab: NORMAL

## 2022-08-22 PROCEDURE — 81025 URINE PREGNANCY TEST: CPT | Performed by: OBSTETRICS & GYNECOLOGY

## 2022-08-22 PROCEDURE — 57456 ENDOCERV CURETTAGE W/SCOPE: CPT | Performed by: OBSTETRICS & GYNECOLOGY

## 2022-08-22 PROCEDURE — 88305 TISSUE EXAM BY PATHOLOGIST: CPT | Performed by: OBSTETRICS & GYNECOLOGY

## 2022-08-22 RX ORDER — FLUCONAZOLE 150 MG/1
TABLET ORAL
COMMUNITY
Start: 2022-08-19 | End: 2022-10-03

## 2022-08-22 RX ORDER — CEPHALEXIN 500 MG/1
CAPSULE ORAL
COMMUNITY
Start: 2022-08-19 | End: 2022-10-03

## 2022-08-22 NOTE — PROGRESS NOTES
Colposcopy    Date of procedure:  8/22/2022    Risks and benefits discussed? yes  All questions answered? yes  Consents given by the patient  Written consent obtained? yes    Local anesthesia used:  no    Pre-op indication: ASCUS with POSITIVE high risk HPV  Procedure documentation:    The cervix was initially viewed colposcopically through a green filter.  The cervix was next bathed in acetic acid.   The findings were as follows:  Urine pregnancy test negative      The transformation zone was able to be seen adequately.    No visible lesions    Ectocervical biopsies were not performed    An ECC was performed.    Patient tolerated the procedure well.      Colposcopic Impression: 1. Adequate colposcopy  2. Colposcopic findings are consistent with PAP       Plan: Will base further treatment on pathology results  Post biopsy instructions given to patient.  Specimens labelled and sent to pathology.  Will call with results and next steps      This note was electronically signed.    Saul Abarca MD  August 22, 2022

## 2022-08-24 LAB
CYTO UR: NORMAL
LAB AP CASE REPORT: NORMAL
Lab: NORMAL
PATH REPORT.FINAL DX SPEC: NORMAL
PATH REPORT.GROSS SPEC: NORMAL

## 2022-10-03 ENCOUNTER — ROUTINE PRENATAL (OUTPATIENT)
Dept: OBSTETRICS AND GYNECOLOGY | Facility: CLINIC | Age: 32
End: 2022-10-03

## 2022-10-03 VITALS — DIASTOLIC BLOOD PRESSURE: 78 MMHG | SYSTOLIC BLOOD PRESSURE: 116 MMHG | WEIGHT: 155.4 LBS | BODY MASS INDEX: 28.42 KG/M2

## 2022-10-03 DIAGNOSIS — O99.331 MATERNAL TOBACCO USE IN FIRST TRIMESTER: ICD-10-CM

## 2022-10-03 DIAGNOSIS — O34.219 HISTORY OF CESAREAN DELIVERY, CURRENTLY PREGNANT: ICD-10-CM

## 2022-10-03 DIAGNOSIS — O23.41 URINARY TRACT INFECTION IN MOTHER DURING FIRST TRIMESTER OF PREGNANCY: ICD-10-CM

## 2022-10-03 DIAGNOSIS — Z34.81 ENCOUNTER FOR SUPERVISION OF OTHER NORMAL PREGNANCY IN FIRST TRIMESTER: ICD-10-CM

## 2022-10-03 DIAGNOSIS — Z11.3 SCREENING EXAMINATION FOR VENEREAL DISEASE: ICD-10-CM

## 2022-10-03 DIAGNOSIS — Z3A.01 LESS THAN 8 WEEKS GESTATION OF PREGNANCY: ICD-10-CM

## 2022-10-03 DIAGNOSIS — Z01.419 ENCOUNTER FOR GYNECOLOGICAL EXAMINATION WITHOUT ABNORMAL FINDING: Primary | ICD-10-CM

## 2022-10-03 PROCEDURE — 99213 OFFICE O/P EST LOW 20 MIN: CPT | Performed by: OBSTETRICS & GYNECOLOGY

## 2022-10-03 RX ORDER — NICOTINE 21 MG/24HR
1 PATCH, TRANSDERMAL 24 HOURS TRANSDERMAL EVERY 24 HOURS
Qty: 14 PATCH | Refills: 0 | Status: SHIPPED | OUTPATIENT
Start: 2022-10-03 | End: 2022-12-07

## 2022-10-03 NOTE — PROGRESS NOTES
[facility]   HISTORY AND PHYSICAL  Subjective   Subjective     Chief Complaint:   Chief Complaint   Patient presents with   • Initial Prenatal Visit     Pt had u/s measuring 8w1d today. Pt 3rd pregnancy. Pt denies any questions or concerns at this time.       History of Present Illness  Jennifer Liu is a 32 y.o. female  who presents for new OB. She is without complaints. Taking PNV consistently. No vaginal bleeding or discharge. No nausea or vomiting. Tolerating PO w/o difficulty. NO urinary or bowel complaints. Expected pregnancy. No contraception at time of conception. Minor breast tenderness. H/o c/s x1, low-transverse for non-reassuring fetal heart tones. Patient undecided at this time about TOLAC.    Review of Systems   Review of Systems - Negative except breast tenderness      Personal History     OB History    Para Term  AB Living   3 1 1 0 1 1   SAB IAB Ectopic Molar Multiple Live Births   1 0 0 0 0 1      # Outcome Date GA Lbr Scottie/2nd Weight Sex Delivery Anes PTL Lv   3 Current            2 Term 10/02/18 39w5d  3650 g (8 lb 0.8 oz) M CS-LTranv Spinal N SYED      Complications: Fetal Intolerance      Name: BAILEY LIU      Apgar1: 8  Apgar5: 9   1 SAB         FD     Past Medical History:   Diagnosis Date   • Abnormal Pap smear of cervix    • Anxiety    • Migraine    • Miscarriage    • Ovarian cyst      Past Surgical History:   Procedure Laterality Date   •  SECTION N/A 10/02/2018    Procedure:  SECTION PRIMARY;  Surgeon: Alice Quesada MD;  Location: [facility] LABOR DELIVERY;  Service: Obstetrics/Gynecology   • CYST REMOVAL      2018       Home Medications:  LORazepam, Omega-3 Fatty Acids, loratadine, nicotine, ondansetron, prenatal vitamin 27-0.8, and valACYclovir    Allergies:  She is allergic to septra [sulfamethoxazole-trimethoprim] and sulfa antibiotics.    Objective    Objective     Vitals:   BP: (116)/(78) 116/78    Physical Exam    General: Well Developed, Well Nourished, not in acute distress   HEENT: normocephalic, atraumatic, conjunctiva non-icteric    Respiratory: clear to auscultation bilaterally, non-labored, symmetrical chest rise   Cardiovascular: regular rate & rhythm, heart sounds normal  Gastrointestinal: gravid, soft, no TTP, no rebound tenderness or guarding to palpation, no palpable ctx   Neurologic: alert and oriented, CN II-XII grossly intact without focal deficit, DTRs 2+ lower extremities, no clonus   Psychiatric: normal mood, pleasant, cooperative  Musculoskeletal: negative calf tenderness, no lower extremity edema  Skin: warm & dry, no cyanosis, no jaundice    Pelvic Exam:  *Consent to pelvic exam obtained verbally from the patient. RN chaperone present during the exam.  Vagina: No lesions, normal physiologic appearing discharge, no pooling/bleeding or clots, cervix visually closed  Uterus: Appropriate for gestational age, nontender, no palpable contractions   Cervix: closed/thick/high     Result Review    US Ob Transvaginal (10/03/2022 09:28)  -CRL c/w 8w1d, FRANSISCO 5/14/23  - bpm    Assessment & Plan   Assessment / Plan     Diagnoses and all orders for this visit:    1. Encounter for gynecological examination without abnormal finding (Primary)    2. Screening examination for venereal disease    3. Less than 8 weeks gestation of pregnancy  -     Chlamydia trachomatis, Neisseria gonorrhoeae, PCR w/ confirmation - Urine, Urine, Clean Catch  -     ABO / Rh  -     Ambulatory Referral to Perinatology  -     Antibody Screen  -     CBC & Differential  -     Hepatitis B Surface Antigen  -     Hepatitis C Antibody  -     Urine Culture - Urine, Urine, Clean Catch  -     Varicella Zoster Antibody, IgG  -     ToxASSURE Select 13 (MW) - Urine, Clean Catch  -     Rubella Antibody, IgG  -     RPR  -     HIV-1 / O / 2 Ag / Antibody 4th Generation  -     Panorama Prenatal Screen - Blood,; Future    4. Encounter for supervision of  other normal pregnancy in first trimester    5. History of  delivery, currently pregnant    6. Maternal tobacco use in first trimester  -     nicotine (Nicoderm CQ) 14 MG/24HR patch; Place 1 patch on the skin as directed by provider Daily.  Dispense: 14 patch; Refill: 0  -     nicotine (Nicoderm CQ) 7 MG/24HR patch; Place 1 patch on the skin as directed by provider Daily.  Dispense: 14 patch; Refill: 0        Discussion:   Tobacco cessation discussed. Patient wants to try patches for weaning off.   New OB Visit. US ordered today, reviewed and shows 8 weeks gestation, EDC 23, which is consistent with LMP. Based on LMP, her FRANSISCO is 23.  Prior pregnancy complicated by LTCS for NRFHTs.   New OB labs ordered today  Discussed OTC Unisom and B6  Discussed COVID vaccine, Tdap, and flu vaccine recommendations  Discussed ffDNA screening option  Discussed normal prenatal routines  Questions answered    Return in about 4 weeks (around 10/31/2022) for OB visit, OB .    Errol Robin MD

## 2022-10-10 LAB
ABO GROUP BLD: NORMAL
BACTERIA UR CULT: ABNORMAL
BACTERIA UR CULT: ABNORMAL
BASOPHILS # BLD AUTO: 0.06 10*3/MM3 (ref 0–0.2)
BASOPHILS NFR BLD AUTO: 0.5 % (ref 0–1.5)
BLD GP AB SCN SERPL QL: NEGATIVE
C TRACH RRNA SPEC QL NAA+PROBE: NEGATIVE
DRUGS UR: NORMAL
EOSINOPHIL # BLD AUTO: 0.11 10*3/MM3 (ref 0–0.4)
EOSINOPHIL NFR BLD AUTO: 0.9 % (ref 0.3–6.2)
ERYTHROCYTE [DISTWIDTH] IN BLOOD BY AUTOMATED COUNT: 12.9 % (ref 12.3–15.4)
HBV SURFACE AG SERPL QL IA: NEGATIVE
HCT VFR BLD AUTO: 38.2 % (ref 34–46.6)
HCV AB S/CO SERPL IA: <0.1 S/CO RATIO (ref 0–0.9)
HGB BLD-MCNC: 12.8 G/DL (ref 12–15.9)
HIV 1+2 AB+HIV1 P24 AG SERPL QL IA: NON REACTIVE
IMM GRANULOCYTES # BLD AUTO: 0.06 10*3/MM3 (ref 0–0.05)
IMM GRANULOCYTES NFR BLD AUTO: 0.5 % (ref 0–0.5)
LYMPHOCYTES # BLD AUTO: 2.94 10*3/MM3 (ref 0.7–3.1)
LYMPHOCYTES NFR BLD AUTO: 23.5 % (ref 19.6–45.3)
MCH RBC QN AUTO: 31.6 PG (ref 26.6–33)
MCHC RBC AUTO-ENTMCNC: 33.5 G/DL (ref 31.5–35.7)
MCV RBC AUTO: 94.3 FL (ref 79–97)
MONOCYTES # BLD AUTO: 0.54 10*3/MM3 (ref 0.1–0.9)
MONOCYTES NFR BLD AUTO: 4.3 % (ref 5–12)
N GONORRHOEA RRNA SPEC QL NAA+PROBE: NEGATIVE
NEUTROPHILS # BLD AUTO: 8.8 10*3/MM3 (ref 1.7–7)
NEUTROPHILS NFR BLD AUTO: 70.3 % (ref 42.7–76)
NRBC BLD AUTO-RTO: 0 /100 WBC (ref 0–0.2)
OTHER ANTIBIOTIC SUSC ISLT: ABNORMAL
PLATELET # BLD AUTO: 360 10*3/MM3 (ref 140–450)
RBC # BLD AUTO: 4.05 10*6/MM3 (ref 3.77–5.28)
RH BLD: POSITIVE
RPR SER QL: NON REACTIVE
RUBV IGG SERPL IA-ACNC: 8.15 INDEX
VZV IGG SER IA-ACNC: 540 INDEX
WBC # BLD AUTO: 12.51 10*3/MM3 (ref 3.4–10.8)

## 2022-10-10 RX ORDER — AMOXICILLIN AND CLAVULANATE POTASSIUM 875; 125 MG/1; MG/1
1 TABLET, FILM COATED ORAL EVERY 12 HOURS SCHEDULED
Status: SHIPPED | OUTPATIENT
Start: 2022-10-10 | End: 2022-10-17

## 2022-10-12 ENCOUNTER — TELEPHONE (OUTPATIENT)
Dept: OBSTETRICS AND GYNECOLOGY | Facility: CLINIC | Age: 32
End: 2022-10-12

## 2022-10-12 RX ORDER — AMOXICILLIN AND CLAVULANATE POTASSIUM 875; 125 MG/1; MG/1
1 TABLET, FILM COATED ORAL EVERY 12 HOURS
Qty: 14 TABLET | Refills: 0 | Status: SHIPPED | OUTPATIENT
Start: 2022-10-12 | End: 2022-10-19

## 2022-10-12 NOTE — TELEPHONE ENCOUNTER
Pt was seen 10/3/22. Pt called today to inquire about an antibiotic.  Pt saw urinary results on mychart and message stating a prescription was sent to her pharmacy.  Medication does not appear to be sent.  Please advise.

## 2022-11-03 ENCOUNTER — ROUTINE PRENATAL (OUTPATIENT)
Dept: OBSTETRICS AND GYNECOLOGY | Facility: CLINIC | Age: 32
End: 2022-11-03

## 2022-11-03 VITALS — BODY MASS INDEX: 29.63 KG/M2 | SYSTOLIC BLOOD PRESSURE: 118 MMHG | WEIGHT: 162 LBS | DIASTOLIC BLOOD PRESSURE: 70 MMHG

## 2022-11-03 DIAGNOSIS — O34.219 HISTORY OF CESAREAN DELIVERY, CURRENTLY PREGNANT: ICD-10-CM

## 2022-11-03 DIAGNOSIS — Z34.82 PRENATAL CARE, SUBSEQUENT PREGNANCY, SECOND TRIMESTER: Primary | ICD-10-CM

## 2022-11-03 DIAGNOSIS — O99.330 TOBACCO USE DURING PREGNANCY, ANTEPARTUM: ICD-10-CM

## 2022-11-03 LAB
GLUCOSE UR STRIP-MCNC: NEGATIVE MG/DL
PROT UR STRIP-MCNC: NEGATIVE MG/DL

## 2022-11-03 PROCEDURE — 99213 OFFICE O/P EST LOW 20 MIN: CPT | Performed by: ADVANCED PRACTICE MIDWIFE

## 2022-11-03 NOTE — PROGRESS NOTES
Reason for visit: Routine OB visit at 12w1d     CC:  32-yr old  here for routine OBV at 12w1d.  FRANSISCO 2023, by Last Menstrual Period.  Patient reports she is doing well and denies VB, LOF, contractions, or other concerns. Also denies h/a, visual disturbances, and epigastric pain.     ROS: All systems reviewed and are negative.    Wt 162 lb for a TWG of 6.804 kg (15 lb), /70, FHTs 157  Urine today and reviewed: negative glucose, negative protein    20-week Anatomy Scan: scheduled for 2023    Exam:  General Appearance:  Healthy appearing . Normal mood and behavior.  HEENT: NCAT, EOMI  HR str and reg. Lungs clear. Resp even and unlabored.  Abdomen is soft and nontender. Bowel sounds active. Uterus is consistent with EGA  Ext: no edema, nontender, no trauma, or cyanosis.    Impression  Diagnoses and all orders for this visit:    1. Prenatal care, subsequent pregnancy, second trimester (Primary)  - Discussed second trimester of pregnancy, including discomforts and measures of comfort, pelvic pain/cramping warnings, bleeding warnings, and signs and symptoms to report. Second trimester of pregnancy video and Round Ligament Pain education included in the AVS.  - Discussed ffDNA and maternal carrier screening. Patient desires testing through shared decision-making  - Discussed and encouraged to come to the hospital or call for vaginal bleeding, suspected leaking of fluid, pelvic pain/cramping, or any other concerns.  - Return to office in four weeks for routine OB visit with Dr. Quesada with [facility] U/S and as needed with concerns.  -     Leonardo Horizon 14 (Pan-Ethnic Standard) - Blood,  -     Leonardo Panorama Prenatal Test: Chromosomes 13, 18, 21, X & Y: Triploidy 22Q.11.2 Deletion - Blood,    2. History of  delivery, currently pregnant  - Patient with history of previous  section for fetal intolerance of labor. Patient desires a trial of labor but is also anxious about the thought  of labor, so at present she feels she will elect for a scheduled  section.    3. Tobacco use during pregnancy, antepartum          This note has been signed electronically.    Marjorie Ch, DNP, APRN, CNM, RNC-OB

## 2022-11-04 PROBLEM — B00.1 HERPES LABIALIS: Status: ACTIVE | Noted: 2022-11-04

## 2022-11-04 PROBLEM — F41.9 ANXIETY DISORDER: Status: ACTIVE | Noted: 2022-11-04

## 2022-11-04 PROBLEM — B00.1 HERPES LABIALIS: Status: RESOLVED | Noted: 2022-11-04 | Resolved: 2022-11-04

## 2022-12-07 ENCOUNTER — ROUTINE PRENATAL (OUTPATIENT)
Dept: OBSTETRICS AND GYNECOLOGY | Facility: CLINIC | Age: 32
End: 2022-12-07

## 2022-12-07 VITALS — DIASTOLIC BLOOD PRESSURE: 74 MMHG | WEIGHT: 162 LBS | SYSTOLIC BLOOD PRESSURE: 100 MMHG | BODY MASS INDEX: 29.63 KG/M2

## 2022-12-07 DIAGNOSIS — O99.330 TOBACCO USE DURING PREGNANCY, ANTEPARTUM: ICD-10-CM

## 2022-12-07 DIAGNOSIS — O34.219 HISTORY OF CESAREAN DELIVERY, CURRENTLY PREGNANT: ICD-10-CM

## 2022-12-07 DIAGNOSIS — Z34.82 PRENATAL CARE, SUBSEQUENT PREGNANCY, SECOND TRIMESTER: Primary | ICD-10-CM

## 2022-12-07 LAB
GLUCOSE UR STRIP-MCNC: NEGATIVE MG/DL
PROT UR STRIP-MCNC: NEGATIVE MG/DL

## 2022-12-07 PROCEDURE — 99212 OFFICE O/P EST SF 10 MIN: CPT | Performed by: OBSTETRICS & GYNECOLOGY

## 2022-12-07 RX ORDER — OMEGA-3-ACID ETHYL ESTERS 1 G/1
CAPSULE, LIQUID FILLED ORAL
COMMUNITY
Start: 2022-12-06

## 2022-12-07 NOTE — PROGRESS NOTES
To ER Sunday morning for anxiety and sharp pains in abdomen.  Patient was told that she had round ligament pain.  That pain has resolved and she is not hurting at all right now.  Never any LOF or VB.  + FM  MFM u/s is 1/11/23  Still smoking 1 ppd, encouraged her to cut back

## 2023-01-11 ENCOUNTER — ROUTINE PRENATAL (OUTPATIENT)
Dept: OBSTETRICS AND GYNECOLOGY | Facility: CLINIC | Age: 33
End: 2023-01-11
Payer: COMMERCIAL

## 2023-01-11 VITALS — SYSTOLIC BLOOD PRESSURE: 110 MMHG | DIASTOLIC BLOOD PRESSURE: 60 MMHG | BODY MASS INDEX: 30 KG/M2 | WEIGHT: 164 LBS

## 2023-01-11 DIAGNOSIS — Z34.82 PRENATAL CARE, SUBSEQUENT PREGNANCY, SECOND TRIMESTER: Primary | ICD-10-CM

## 2023-01-11 DIAGNOSIS — O99.330 TOBACCO USE DURING PREGNANCY, ANTEPARTUM: ICD-10-CM

## 2023-01-11 DIAGNOSIS — O34.219 HISTORY OF CESAREAN DELIVERY, CURRENTLY PREGNANT: ICD-10-CM

## 2023-01-11 LAB
GLUCOSE UR STRIP-MCNC: NEGATIVE MG/DL
PROT UR STRIP-MCNC: NEGATIVE MG/DL

## 2023-01-11 PROCEDURE — 99213 OFFICE O/P EST LOW 20 MIN: CPT | Performed by: NURSE PRACTITIONER

## 2023-01-11 RX ORDER — PNV NO.95/FERROUS FUM/FOLIC AC 28MG-0.8MG
TABLET ORAL EVERY 24 HOURS
COMMUNITY
End: 2023-02-09

## 2023-01-11 NOTE — PROGRESS NOTES
Pt reports good fetal movements and voices no concerns.   Pt denies UC's, pelvic pain, vaginal bleeding, leaking of fluid, HA, visual disturbances and epigastric pain.  MFM appt today.   Continues to smoke 1 ppd, encouraged smoking cessation.   Discussed plan of care and S&S to report.

## 2023-02-09 ENCOUNTER — ROUTINE PRENATAL (OUTPATIENT)
Dept: OBSTETRICS AND GYNECOLOGY | Facility: CLINIC | Age: 33
End: 2023-02-09
Payer: COMMERCIAL

## 2023-02-09 VITALS — BODY MASS INDEX: 31.28 KG/M2 | DIASTOLIC BLOOD PRESSURE: 68 MMHG | WEIGHT: 171 LBS | SYSTOLIC BLOOD PRESSURE: 116 MMHG

## 2023-02-09 DIAGNOSIS — Z34.82 PRENATAL CARE, SUBSEQUENT PREGNANCY, SECOND TRIMESTER: ICD-10-CM

## 2023-02-09 DIAGNOSIS — F41.9 ANXIETY: Primary | ICD-10-CM

## 2023-02-09 DIAGNOSIS — O34.219 HISTORY OF CESAREAN DELIVERY, CURRENTLY PREGNANT: ICD-10-CM

## 2023-02-09 LAB
GLUCOSE UR STRIP-MCNC: NEGATIVE MG/DL
PROT UR STRIP-MCNC: NEGATIVE MG/DL

## 2023-02-09 PROCEDURE — 99214 OFFICE O/P EST MOD 30 MIN: CPT | Performed by: OBSTETRICS & GYNECOLOGY

## 2023-02-09 RX ORDER — BUSPIRONE HYDROCHLORIDE 7.5 MG/1
7.5 TABLET ORAL 3 TIMES DAILY PRN
Qty: 90 TABLET | Refills: 1 | Status: SHIPPED | OUTPATIENT
Start: 2023-02-09

## 2023-02-09 RX ORDER — FLUTICASONE PROPIONATE 50 MCG
SPRAY, SUSPENSION (ML) NASAL
COMMUNITY
Start: 2023-01-18

## 2023-02-09 NOTE — PROGRESS NOTES
Feeling tired, but otherwise ok.  Occasional cramping, but nothing that is painful  No LOF or VB.  Good FM  H/o previous C/S, questions about TOLAC answered.  Patient still undecided.    GCT and 4D at next visit.  Instructions given.  Patient previously on lorazepam for anxiety but is not taking it now.  She has questions about using CBD, which I discouraged.  Patient does not want to take zoloft daily, but does want a script for buspar which can be taken prn

## 2023-03-01 ENCOUNTER — ROUTINE PRENATAL (OUTPATIENT)
Dept: OBSTETRICS AND GYNECOLOGY | Facility: CLINIC | Age: 33
End: 2023-03-01
Payer: COMMERCIAL

## 2023-03-01 VITALS — WEIGHT: 177 LBS | DIASTOLIC BLOOD PRESSURE: 64 MMHG | SYSTOLIC BLOOD PRESSURE: 128 MMHG | BODY MASS INDEX: 32.37 KG/M2

## 2023-03-01 DIAGNOSIS — Z3A.29 29 WEEKS GESTATION OF PREGNANCY: ICD-10-CM

## 2023-03-01 DIAGNOSIS — O34.219 HISTORY OF CESAREAN DELIVERY, CURRENTLY PREGNANT: ICD-10-CM

## 2023-03-01 DIAGNOSIS — O99.330 TOBACCO USE DURING PREGNANCY, ANTEPARTUM: ICD-10-CM

## 2023-03-01 DIAGNOSIS — Z71.85 IMMUNIZATION COUNSELING: ICD-10-CM

## 2023-03-01 DIAGNOSIS — Z34.83 PRENATAL CARE, SUBSEQUENT PREGNANCY, THIRD TRIMESTER: Primary | ICD-10-CM

## 2023-03-01 DIAGNOSIS — Z23 NEED FOR TDAP VACCINATION: ICD-10-CM

## 2023-03-01 LAB
GLUCOSE UR STRIP-MCNC: NEGATIVE MG/DL
PROT UR STRIP-MCNC: NEGATIVE MG/DL

## 2023-03-01 PROCEDURE — 99214 OFFICE O/P EST MOD 30 MIN: CPT | Performed by: ADVANCED PRACTICE MIDWIFE

## 2023-03-01 PROCEDURE — 90715 TDAP VACCINE 7 YRS/> IM: CPT | Performed by: ADVANCED PRACTICE MIDWIFE

## 2023-03-01 PROCEDURE — 90471 IMMUNIZATION ADMIN: CPT | Performed by: ADVANCED PRACTICE MIDWIFE

## 2023-03-01 NOTE — PROGRESS NOTES
Reason for visit: Routine OB visit at 29w0d     CC:  31-yr old  here for routine OBV at 29w0d.  FRANSISCO 2023, by Last Menstrual Period.  Patient reports good fetal movement and denies VB, LOF, contractions, or other concerns. Also denies h/a, visual disturbances, and epigastric pain.     ROS: All systems reviewed and are negative    Wt 177 lb for a TWG of 13.6 kg (30 lb), /64, FHTs 133, FH 31 cm  Urine today and reviewed: negative glucose, negative protein    22-week (2023) Anatomy Scan: normal; posterior placenta without evidence of placenta previa    Exam:  General Appearance:  Healthy appearing . Normal mood and behavior.  HEENT: NCAT, EOMI  HR str and reg. Lungs clear. Resp even and unlabored.  Abdomen is soft and nontender. Bowel sounds active. Uterus is consistent with EGA  Ext: no edema, nontender, no trauma, or cyanosis.    Impression  Diagnoses and all orders for this visit:    1. Prenatal care, subsequent pregnancy, third trimester (Primary)  - Discussed third trimester of pregnancy, including discomforts and measures of support,  labor warnings, preeclampsia warnings, and signs and symptoms to report. Third trimester of Pregnancy video included in the AVS.  - Discussed and encouraged to come to the hospital for vaginal bleeding, leaking of fluid, contractions, or any other concerns.  - Return to office in 2 weeks for routine OB visit with Dr. Quesada and as needed with concerns.  - Discussed glucose screening and checking hemoglobin for anemia in third trimester for today.  - Labs and orders for today:  -     Gestational Screen 1 Hr ([facility])  -     Hemoglobin    2. 29 weeks gestation of pregnancy    3. History of  delivery, currently pregnant    4. Immunization counseling  - Discussed Tdap immunization recommendations during pregnancy. Patient consents to receive the Tdap immunization during this clinic visit. Tdap (Tetanus, Diptheria, Pertussis) Vaccine: What  You Need to Know (VIS) education included in the AVS.    5. Need for Tdap vaccination  -     Tdap Vaccine Greater Than or Equal To 6yo IM    6. Tobacco use during pregnancy, antepartum          This note has been signed electronically.    Marjorie Ch, DNP, APRN, CNM, RNC-OB

## 2023-03-02 LAB
GLUCOSE 1H P 50 G GLC PO SERPL-MCNC: 134 MG/DL (ref 65–139)
HGB BLD-MCNC: 11.3 G/DL (ref 12–15.9)

## 2023-03-15 ENCOUNTER — TELEPHONE (OUTPATIENT)
Dept: OBSTETRICS AND GYNECOLOGY | Facility: CLINIC | Age: 33
End: 2023-03-15
Payer: COMMERCIAL

## 2023-03-15 ENCOUNTER — ROUTINE PRENATAL (OUTPATIENT)
Dept: OBSTETRICS AND GYNECOLOGY | Facility: CLINIC | Age: 33
End: 2023-03-15
Payer: COMMERCIAL

## 2023-03-15 VITALS — SYSTOLIC BLOOD PRESSURE: 118 MMHG | DIASTOLIC BLOOD PRESSURE: 70 MMHG | BODY MASS INDEX: 32.92 KG/M2 | WEIGHT: 180 LBS

## 2023-03-15 DIAGNOSIS — N89.8 VAGINAL DISCHARGE: ICD-10-CM

## 2023-03-15 DIAGNOSIS — O34.219 HISTORY OF CESAREAN DELIVERY, CURRENTLY PREGNANT: Primary | ICD-10-CM

## 2023-03-15 LAB
GLUCOSE UR STRIP-MCNC: NEGATIVE MG/DL
PROT UR STRIP-MCNC: NEGATIVE MG/DL

## 2023-03-15 PROCEDURE — 87481 CANDIDA DNA AMP PROBE: CPT | Performed by: OBSTETRICS & GYNECOLOGY

## 2023-03-15 PROCEDURE — 87563 M. GENITALIUM AMP PROBE: CPT | Performed by: OBSTETRICS & GYNECOLOGY

## 2023-03-15 PROCEDURE — 87512 GARDNER VAG DNA QUANT: CPT | Performed by: OBSTETRICS & GYNECOLOGY

## 2023-03-15 PROCEDURE — 87798 DETECT AGENT NOS DNA AMP: CPT | Performed by: OBSTETRICS & GYNECOLOGY

## 2023-03-15 PROCEDURE — 99213 OFFICE O/P EST LOW 20 MIN: CPT | Performed by: OBSTETRICS & GYNECOLOGY

## 2023-03-15 PROCEDURE — 87661 TRICHOMONAS VAGINALIS AMPLIF: CPT | Performed by: OBSTETRICS & GYNECOLOGY

## 2023-03-15 RX ORDER — ACETAMINOPHEN 500 MG
1000 TABLET ORAL ONCE
OUTPATIENT
Start: 2023-03-15 | End: 2023-03-15

## 2023-03-15 RX ORDER — OXYTOCIN 10 [USP'U]/ML
999 INJECTION, SOLUTION INTRAMUSCULAR; INTRAVENOUS ONCE
OUTPATIENT
Start: 2023-03-15

## 2023-03-15 RX ORDER — SODIUM CHLORIDE, SODIUM LACTATE, POTASSIUM CHLORIDE, CALCIUM CHLORIDE 600; 310; 30; 20 MG/100ML; MG/100ML; MG/100ML; MG/100ML
125 INJECTION, SOLUTION INTRAVENOUS CONTINUOUS
OUTPATIENT
Start: 2023-03-15

## 2023-03-15 RX ORDER — SODIUM CHLORIDE 0.9 % (FLUSH) 0.9 %
3 SYRINGE (ML) INJECTION EVERY 12 HOURS SCHEDULED
OUTPATIENT
Start: 2023-03-15

## 2023-03-15 RX ORDER — MISOPROSTOL 100 UG/1
800 TABLET ORAL AS NEEDED
OUTPATIENT
Start: 2023-03-15

## 2023-03-15 RX ORDER — CARBOPROST TROMETHAMINE 250 UG/ML
250 INJECTION, SOLUTION INTRAMUSCULAR AS NEEDED
OUTPATIENT
Start: 2023-03-15

## 2023-03-15 RX ORDER — METHYLERGONOVINE MALEATE 0.2 MG/ML
200 INJECTION INTRAVENOUS ONCE AS NEEDED
OUTPATIENT
Start: 2023-03-15

## 2023-03-15 RX ORDER — OXYTOCIN 10 [USP'U]/ML
1 INJECTION, SOLUTION INTRAMUSCULAR; INTRAVENOUS CONTINUOUS
OUTPATIENT
Start: 2023-03-15 | End: 2023-03-15

## 2023-03-15 RX ORDER — SODIUM CHLORIDE 0.9 % (FLUSH) 0.9 %
10 SYRINGE (ML) INJECTION AS NEEDED
OUTPATIENT
Start: 2023-03-15

## 2023-03-15 RX ORDER — LIDOCAINE HYDROCHLORIDE 10 MG/ML
5 INJECTION, SOLUTION EPIDURAL; INFILTRATION; INTRACAUDAL; PERINEURAL AS NEEDED
OUTPATIENT
Start: 2023-03-15

## 2023-03-15 RX ORDER — KETOROLAC TROMETHAMINE 15 MG/ML
30 INJECTION, SOLUTION INTRAMUSCULAR; INTRAVENOUS ONCE
OUTPATIENT
Start: 2023-03-15 | End: 2023-03-15

## 2023-03-15 NOTE — TELEPHONE ENCOUNTER
Pt notified of  scheduled for 5-10-23. Pt to arrive at 11:00am for a 1:00pm surgery. Pt understood. BS

## 2023-03-15 NOTE — PROGRESS NOTES
Patient c/o thick, white discharge.  Her partner complained of odor.  GYN supplemental fluid collected for BV and yeast testing  No abdominal pain.  No LOF or VB  Good FM

## 2023-03-20 LAB — TRICHOMONAS VAGINALIS PCR: NOT DETECTED

## 2023-03-21 LAB
LAB AP CASE REPORT: NORMAL
Lab: NORMAL
PATH INTERP SPEC-IMP: NORMAL

## 2023-03-21 RX ORDER — METRONIDAZOLE 500 MG/1
500 TABLET ORAL 2 TIMES DAILY
Qty: 14 TABLET | Refills: 0 | Status: SHIPPED | OUTPATIENT
Start: 2023-03-21 | End: 2023-03-28

## 2023-03-29 ENCOUNTER — ROUTINE PRENATAL (OUTPATIENT)
Dept: OBSTETRICS AND GYNECOLOGY | Facility: CLINIC | Age: 33
End: 2023-03-29
Payer: COMMERCIAL

## 2023-03-29 VITALS — DIASTOLIC BLOOD PRESSURE: 64 MMHG | WEIGHT: 185 LBS | SYSTOLIC BLOOD PRESSURE: 112 MMHG | BODY MASS INDEX: 33.84 KG/M2

## 2023-03-29 DIAGNOSIS — Z3A.33 33 WEEKS GESTATION OF PREGNANCY: ICD-10-CM

## 2023-03-29 DIAGNOSIS — Z34.83 MULTIGRAVIDA IN THIRD TRIMESTER: Primary | ICD-10-CM

## 2023-03-29 DIAGNOSIS — O99.330 TOBACCO USE DURING PREGNANCY, ANTEPARTUM: ICD-10-CM

## 2023-03-29 DIAGNOSIS — O34.219 HISTORY OF CESAREAN DELIVERY, CURRENTLY PREGNANT: ICD-10-CM

## 2023-03-29 LAB
GLUCOSE UR STRIP-MCNC: NEGATIVE MG/DL
PROT UR STRIP-MCNC: NEGATIVE MG/DL

## 2023-03-29 NOTE — PROGRESS NOTES
Reason for visit: Routine OB visit at 33w0d     CC:  Patient reports good fetal movement. She has been really tired. For the last couple of nights, she has experienced back pain and nausea. It then resolves without intervention. She questions as to if this could be [place] or contractions. Denies VB, LOF, h/a, visual changes, and right upper quadrant pain.     ROS: All systems reviewed and are negative with exception of the following: fatigue, back pain, nausea    Wt 185 lb for a TWG of 17.2 kg (38 lb), /64, FHTs 143, FH 34 cm  Urine today and reviewed: negative glucose, negative protein    22-weeks Anatomy scan: normal; posterior placenta without evidence of placenta previa    Exam:  General Appearance:  Healthy appearing . Normal mood and behavior.  HEENT: NCAT, EOMI  HR str and reg. Lungs clear. Resp even and unlabored.  Abdomen is soft and nontender. Uterus is consistent with EGA  Ext: no edema, nontender, no trauma, or cyanosis.    Impression  Diagnoses and all orders for this visit:    1. Multigravida in third trimester (Primary)  - Discussed third trimester of pregnancy, discomforts and measures of support, fetal movement counts,  labor warnings, preeclampsia warnings, and signs and symptoms to report. Signs and Symptoms of Labor and Third Trimester of Pregnancy videos included in the AVS.  - Discussed and encouraged to come to the hospital or call with vaginal bleeding, leaking of fluid, contractions, or other concerns.  - Return to the office in two weeks for routine OBV with Dr. Quesada and as needed with concerns.    2. 33 weeks gestation of pregnancy    3. History of  delivery, currently pregnant  - Planning for repeat  delivery. ERAS protocol reviewed and handout provided. Patient also provided with CHG bath and bottle of CHG. Her delivery is presently scheduled for 05/10/2023. Preparing for  Delivery video included in the AVS.     4. Tobacco use during  pregnancy, antepartum          This note has been signed electronically.    Marjorie Ch, DNP, APRN, CNM, RNC-OB

## 2023-04-12 ENCOUNTER — ROUTINE PRENATAL (OUTPATIENT)
Dept: OBSTETRICS AND GYNECOLOGY | Facility: CLINIC | Age: 33
End: 2023-04-12
Payer: COMMERCIAL

## 2023-04-12 VITALS — DIASTOLIC BLOOD PRESSURE: 64 MMHG | SYSTOLIC BLOOD PRESSURE: 104 MMHG | BODY MASS INDEX: 32.92 KG/M2 | WEIGHT: 180 LBS

## 2023-04-12 DIAGNOSIS — O99.330 TOBACCO USE DURING PREGNANCY, ANTEPARTUM: ICD-10-CM

## 2023-04-12 DIAGNOSIS — O34.219 HISTORY OF CESAREAN DELIVERY, CURRENTLY PREGNANT: ICD-10-CM

## 2023-04-12 DIAGNOSIS — Z34.83 MULTIGRAVIDA IN THIRD TRIMESTER: Primary | ICD-10-CM

## 2023-04-12 LAB
GLUCOSE UR STRIP-MCNC: NEGATIVE MG/DL
PROT UR STRIP-MCNC: NEGATIVE MG/DL

## 2023-04-12 NOTE — PROGRESS NOTES
"Having some B-H contractions.  Patient tired and having some low back/sciatic pain.  No LOF or VB.  Good FM \"all the time\"  Cultures next visit  "

## 2023-04-18 ENCOUNTER — ROUTINE PRENATAL (OUTPATIENT)
Dept: OBSTETRICS AND GYNECOLOGY | Facility: CLINIC | Age: 33
End: 2023-04-18
Payer: COMMERCIAL

## 2023-04-18 VITALS — DIASTOLIC BLOOD PRESSURE: 62 MMHG | BODY MASS INDEX: 32.74 KG/M2 | SYSTOLIC BLOOD PRESSURE: 102 MMHG | WEIGHT: 179 LBS

## 2023-04-18 DIAGNOSIS — O34.219 HISTORY OF CESAREAN DELIVERY, CURRENTLY PREGNANT: ICD-10-CM

## 2023-04-18 DIAGNOSIS — Z34.83 MULTIGRAVIDA IN THIRD TRIMESTER: Primary | ICD-10-CM

## 2023-04-18 LAB
GLUCOSE UR STRIP-MCNC: NEGATIVE MG/DL
PROT UR STRIP-MCNC: NEGATIVE MG/DL

## 2023-04-18 NOTE — PROGRESS NOTES
Really painful contractions and pressure two nights ago, but has been ok since that time.  No LOF or VB  Good FM  Cx still closed and very posterior

## 2023-04-25 ENCOUNTER — ROUTINE PRENATAL (OUTPATIENT)
Dept: OBSTETRICS AND GYNECOLOGY | Facility: CLINIC | Age: 33
End: 2023-04-25
Payer: COMMERCIAL

## 2023-04-25 VITALS — BODY MASS INDEX: 33.84 KG/M2 | WEIGHT: 185 LBS | SYSTOLIC BLOOD PRESSURE: 118 MMHG | DIASTOLIC BLOOD PRESSURE: 72 MMHG

## 2023-04-25 DIAGNOSIS — O34.219 HISTORY OF CESAREAN DELIVERY, CURRENTLY PREGNANT: ICD-10-CM

## 2023-04-25 DIAGNOSIS — Z34.83 MULTIGRAVIDA IN THIRD TRIMESTER: Primary | ICD-10-CM

## 2023-04-25 DIAGNOSIS — O99.330 TOBACCO USE DURING PREGNANCY, ANTEPARTUM: ICD-10-CM

## 2023-04-25 LAB
GLUCOSE UR STRIP-MCNC: NEGATIVE MG/DL
PROT UR STRIP-MCNC: NEGATIVE MG/DL

## 2023-05-02 ENCOUNTER — ROUTINE PRENATAL (OUTPATIENT)
Dept: OBSTETRICS AND GYNECOLOGY | Facility: CLINIC | Age: 33
End: 2023-05-02
Payer: COMMERCIAL

## 2023-05-02 VITALS — SYSTOLIC BLOOD PRESSURE: 122 MMHG | BODY MASS INDEX: 33.65 KG/M2 | DIASTOLIC BLOOD PRESSURE: 60 MMHG | WEIGHT: 184 LBS

## 2023-05-02 DIAGNOSIS — Z34.83 MULTIGRAVIDA IN THIRD TRIMESTER: Primary | ICD-10-CM

## 2023-05-02 DIAGNOSIS — O34.219 HISTORY OF CESAREAN DELIVERY, CURRENTLY PREGNANT: ICD-10-CM

## 2023-05-02 LAB
GLUCOSE UR STRIP-MCNC: NEGATIVE MG/DL
PROT UR STRIP-MCNC: NEGATIVE MG/DL

## 2023-05-02 NOTE — PROGRESS NOTES
Still feeling same pressure, cervix still closed.  No LOF or VB  Good FM  Questions about C/S answered.

## 2023-05-10 ENCOUNTER — ANESTHESIA EVENT (OUTPATIENT)
Dept: LABOR AND DELIVERY | Facility: HOSPITAL | Age: 33
End: 2023-05-10
Payer: COMMERCIAL

## 2023-05-10 ENCOUNTER — ANESTHESIA (OUTPATIENT)
Dept: LABOR AND DELIVERY | Facility: HOSPITAL | Age: 33
End: 2023-05-10
Payer: COMMERCIAL

## 2023-05-10 ENCOUNTER — HOSPITAL ENCOUNTER (INPATIENT)
Facility: HOSPITAL | Age: 33
LOS: 2 days | Discharge: HOME | End: 2023-05-12
Attending: OBSTETRICS & GYNECOLOGY | Admitting: OBSTETRICS & GYNECOLOGY
Payer: COMMERCIAL

## 2023-05-10 DIAGNOSIS — G89.18 PAIN FOLLOWING CESAREAN DELIVERY: Primary | ICD-10-CM

## 2023-05-10 DIAGNOSIS — O34.219 HISTORY OF CESAREAN DELIVERY, CURRENTLY PREGNANT: ICD-10-CM

## 2023-05-10 LAB
ABO GROUP BLD: NORMAL
BLD GP AB SCN SERPL QL: NEGATIVE
DEPRECATED RDW RBC AUTO: 49.1 FL (ref 37–54)
ERYTHROCYTE [DISTWIDTH] IN BLOOD BY AUTOMATED COUNT: 14.4 % (ref 12.3–15.4)
HCT VFR BLD AUTO: 38.4 % (ref 34–46.6)
HGB BLD-MCNC: 12.8 G/DL (ref 12–15.9)
MCH RBC QN AUTO: 31.1 PG (ref 26.6–33)
MCHC RBC AUTO-ENTMCNC: 33.3 G/DL (ref 31.5–35.7)
MCV RBC AUTO: 93.2 FL (ref 79–97)
PLATELET # BLD AUTO: 219 10*3/MM3 (ref 140–450)
PMV BLD AUTO: 10 FL (ref 6–12)
RBC # BLD AUTO: 4.12 10*6/MM3 (ref 3.77–5.28)
RH BLD: POSITIVE
T&S EXPIRATION DATE: NORMAL
WBC NRBC COR # BLD: 12.7 10*3/MM3 (ref 3.4–10.8)

## 2023-05-10 PROCEDURE — 25010000002 ONDANSETRON PER 1 MG: Performed by: NURSE ANESTHETIST, CERTIFIED REGISTERED

## 2023-05-10 PROCEDURE — 25010000002 HYDROMORPHONE 1 MG/ML SOLUTION: Performed by: NURSE ANESTHETIST, CERTIFIED REGISTERED

## 2023-05-10 PROCEDURE — 86850 RBC ANTIBODY SCREEN: CPT | Performed by: OBSTETRICS & GYNECOLOGY

## 2023-05-10 PROCEDURE — 85027 COMPLETE CBC AUTOMATED: CPT | Performed by: OBSTETRICS & GYNECOLOGY

## 2023-05-10 PROCEDURE — 25010000002 OXYTOCIN PER 10 UNITS: Performed by: OBSTETRICS & GYNECOLOGY

## 2023-05-10 PROCEDURE — 59515 CESAREAN DELIVERY: CPT | Performed by: OBSTETRICS & GYNECOLOGY

## 2023-05-10 PROCEDURE — 25010000002 FENTANYL CITRATE (PF) 100 MCG/2ML SOLUTION: Performed by: NURSE ANESTHETIST, CERTIFIED REGISTERED

## 2023-05-10 PROCEDURE — S0260 H&P FOR SURGERY: HCPCS | Performed by: OBSTETRICS & GYNECOLOGY

## 2023-05-10 PROCEDURE — 86901 BLOOD TYPING SEROLOGIC RH(D): CPT | Performed by: OBSTETRICS & GYNECOLOGY

## 2023-05-10 PROCEDURE — 86900 BLOOD TYPING SEROLOGIC ABO: CPT | Performed by: OBSTETRICS & GYNECOLOGY

## 2023-05-10 PROCEDURE — 25010000002 OXYTOCIN PER 10 UNITS: Performed by: NURSE ANESTHETIST, CERTIFIED REGISTERED

## 2023-05-10 PROCEDURE — 51702 INSERT TEMP BLADDER CATH: CPT

## 2023-05-10 PROCEDURE — 25010000002 KETOROLAC TROMETHAMINE PER 15 MG: Performed by: OBSTETRICS & GYNECOLOGY

## 2023-05-10 PROCEDURE — 25010000002 CEFAZOLIN PER 500 MG: Performed by: OBSTETRICS & GYNECOLOGY

## 2023-05-10 PROCEDURE — 25010000002 METOCLOPRAMIDE PER 10 MG: Performed by: NURSE ANESTHETIST, CERTIFIED REGISTERED

## 2023-05-10 PROCEDURE — 88307 TISSUE EXAM BY PATHOLOGIST: CPT | Performed by: OBSTETRICS & GYNECOLOGY

## 2023-05-10 DEVICE — HEMOST ABS SURGICEL PWDR 3GM: Type: IMPLANTABLE DEVICE | Status: FUNCTIONAL

## 2023-05-10 RX ORDER — SODIUM CHLORIDE 0.9 % (FLUSH) 0.9 %
10 SYRINGE (ML) INJECTION AS NEEDED
Status: DISCONTINUED | OUTPATIENT
Start: 2023-05-10 | End: 2023-05-10 | Stop reason: HOSPADM

## 2023-05-10 RX ORDER — OXYTOCIN/0.9 % SODIUM CHLORIDE 30/500 ML
125 PLASTIC BAG, INJECTION (ML) INTRAVENOUS CONTINUOUS PRN
Status: COMPLETED | OUTPATIENT
Start: 2023-05-10 | End: 2023-05-10

## 2023-05-10 RX ORDER — PRENATAL VIT/IRON FUM/FOLIC AC 27MG-0.8MG
1 TABLET ORAL DAILY
Status: DISCONTINUED | OUTPATIENT
Start: 2023-05-10 | End: 2023-05-12 | Stop reason: HOSPADM

## 2023-05-10 RX ORDER — SODIUM CHLORIDE 0.9 % (FLUSH) 0.9 %
3-10 SYRINGE (ML) INJECTION AS NEEDED
Status: DISCONTINUED | OUTPATIENT
Start: 2023-05-10 | End: 2023-05-12 | Stop reason: HOSPADM

## 2023-05-10 RX ORDER — ONDANSETRON 2 MG/ML
INJECTION INTRAMUSCULAR; INTRAVENOUS AS NEEDED
Status: DISCONTINUED | OUTPATIENT
Start: 2023-05-10 | End: 2023-05-10 | Stop reason: SURG

## 2023-05-10 RX ORDER — SODIUM CHLORIDE 0.9 % (FLUSH) 0.9 %
3 SYRINGE (ML) INJECTION EVERY 12 HOURS SCHEDULED
Status: DISCONTINUED | OUTPATIENT
Start: 2023-05-10 | End: 2023-05-10 | Stop reason: HOSPADM

## 2023-05-10 RX ORDER — KETOROLAC TROMETHAMINE 30 MG/ML
30 INJECTION, SOLUTION INTRAMUSCULAR; INTRAVENOUS ONCE
Status: COMPLETED | OUTPATIENT
Start: 2023-05-10 | End: 2023-05-10

## 2023-05-10 RX ORDER — SODIUM CHLORIDE 9 MG/ML
40 INJECTION, SOLUTION INTRAVENOUS AS NEEDED
Status: DISCONTINUED | OUTPATIENT
Start: 2023-05-10 | End: 2023-05-12 | Stop reason: HOSPADM

## 2023-05-10 RX ORDER — NALOXONE HCL 0.4 MG/ML
0.4 VIAL (ML) INJECTION
Status: ACTIVE | OUTPATIENT
Start: 2023-05-10 | End: 2023-05-11

## 2023-05-10 RX ORDER — ACETAMINOPHEN 325 MG/1
650 TABLET ORAL EVERY 6 HOURS
Status: DISCONTINUED | OUTPATIENT
Start: 2023-05-11 | End: 2023-05-12 | Stop reason: HOSPADM

## 2023-05-10 RX ORDER — METOCLOPRAMIDE HYDROCHLORIDE 5 MG/ML
10 INJECTION INTRAMUSCULAR; INTRAVENOUS ONCE
Status: COMPLETED | OUTPATIENT
Start: 2023-05-10 | End: 2023-05-10

## 2023-05-10 RX ORDER — IBUPROFEN 600 MG/1
600 TABLET ORAL EVERY 6 HOURS
Status: DISCONTINUED | OUTPATIENT
Start: 2023-05-11 | End: 2023-05-12 | Stop reason: HOSPADM

## 2023-05-10 RX ORDER — ACETAMINOPHEN 500 MG
1000 TABLET ORAL ONCE
Status: COMPLETED | OUTPATIENT
Start: 2023-05-10 | End: 2023-05-10

## 2023-05-10 RX ORDER — DROPERIDOL 2.5 MG/ML
0.62 INJECTION, SOLUTION INTRAMUSCULAR; INTRAVENOUS ONCE AS NEEDED
Status: DISCONTINUED | OUTPATIENT
Start: 2023-05-10 | End: 2023-05-12 | Stop reason: HOSPADM

## 2023-05-10 RX ORDER — ONDANSETRON 2 MG/ML
4 INJECTION INTRAMUSCULAR; INTRAVENOUS EVERY 6 HOURS PRN
Status: DISCONTINUED | OUTPATIENT
Start: 2023-05-10 | End: 2023-05-12 | Stop reason: HOSPADM

## 2023-05-10 RX ORDER — OXYTOCIN 10 [USP'U]/ML
INJECTION, SOLUTION INTRAMUSCULAR; INTRAVENOUS AS NEEDED
Status: DISCONTINUED | OUTPATIENT
Start: 2023-05-10 | End: 2023-05-10 | Stop reason: SURG

## 2023-05-10 RX ORDER — LIDOCAINE HYDROCHLORIDE 10 MG/ML
5 INJECTION, SOLUTION EPIDURAL; INFILTRATION; INTRACAUDAL; PERINEURAL AS NEEDED
Status: DISCONTINUED | OUTPATIENT
Start: 2023-05-10 | End: 2023-05-10 | Stop reason: HOSPADM

## 2023-05-10 RX ORDER — BUSPIRONE HYDROCHLORIDE 5 MG/1
7.5 TABLET ORAL 3 TIMES DAILY PRN
Status: DISCONTINUED | OUTPATIENT
Start: 2023-05-10 | End: 2023-05-12 | Stop reason: HOSPADM

## 2023-05-10 RX ORDER — BUTORPHANOL TARTRATE 1 MG/ML
0.5 INJECTION, SOLUTION INTRAMUSCULAR; INTRAVENOUS EVERY 6 HOURS PRN
Status: DISCONTINUED | OUTPATIENT
Start: 2023-05-10 | End: 2023-05-12 | Stop reason: HOSPADM

## 2023-05-10 RX ORDER — FENTANYL CITRATE 50 UG/ML
INJECTION, SOLUTION INTRAMUSCULAR; INTRAVENOUS AS NEEDED
Status: DISCONTINUED | OUTPATIENT
Start: 2023-05-10 | End: 2023-05-10 | Stop reason: SURG

## 2023-05-10 RX ORDER — NICOTINE 21 MG/24HR
1 PATCH, TRANSDERMAL 24 HOURS TRANSDERMAL
Status: DISCONTINUED | OUTPATIENT
Start: 2023-05-10 | End: 2023-05-12 | Stop reason: HOSPADM

## 2023-05-10 RX ORDER — OXYTOCIN/0.9 % SODIUM CHLORIDE 30/500 ML
1 PLASTIC BAG, INJECTION (ML) INTRAVENOUS CONTINUOUS
Status: ACTIVE | OUTPATIENT
Start: 2023-05-10 | End: 2023-05-10

## 2023-05-10 RX ORDER — OXYCODONE HYDROCHLORIDE 5 MG/1
5 TABLET ORAL EVERY 4 HOURS PRN
Status: DISCONTINUED | OUTPATIENT
Start: 2023-05-10 | End: 2023-05-12 | Stop reason: HOSPADM

## 2023-05-10 RX ORDER — ONDANSETRON 4 MG/1
4 TABLET, FILM COATED ORAL EVERY 8 HOURS PRN
Status: DISCONTINUED | OUTPATIENT
Start: 2023-05-10 | End: 2023-05-12 | Stop reason: HOSPADM

## 2023-05-10 RX ORDER — ACETAMINOPHEN 500 MG
1000 TABLET ORAL EVERY 6 HOURS
Status: COMPLETED | OUTPATIENT
Start: 2023-05-10 | End: 2023-05-11

## 2023-05-10 RX ORDER — PROMETHAZINE HYDROCHLORIDE 25 MG/1
25 TABLET ORAL EVERY 6 HOURS PRN
Status: DISCONTINUED | OUTPATIENT
Start: 2023-05-10 | End: 2023-05-12 | Stop reason: HOSPADM

## 2023-05-10 RX ORDER — SODIUM CHLORIDE 0.9 % (FLUSH) 0.9 %
10 SYRINGE (ML) INJECTION EVERY 12 HOURS SCHEDULED
Status: DISCONTINUED | OUTPATIENT
Start: 2023-05-10 | End: 2023-05-10 | Stop reason: HOSPADM

## 2023-05-10 RX ORDER — ACETAMINOPHEN 500 MG
1000 TABLET ORAL ONCE
Status: DISCONTINUED | OUTPATIENT
Start: 2023-05-10 | End: 2023-05-10 | Stop reason: SDUPTHER

## 2023-05-10 RX ORDER — SODIUM CHLORIDE, SODIUM LACTATE, POTASSIUM CHLORIDE, CALCIUM CHLORIDE 600; 310; 30; 20 MG/100ML; MG/100ML; MG/100ML; MG/100ML
125 INJECTION, SOLUTION INTRAVENOUS CONTINUOUS
Status: DISCONTINUED | OUTPATIENT
Start: 2023-05-10 | End: 2023-05-10

## 2023-05-10 RX ORDER — CARBOPROST TROMETHAMINE 250 UG/ML
250 INJECTION, SOLUTION INTRAMUSCULAR AS NEEDED
Status: DISCONTINUED | OUTPATIENT
Start: 2023-05-10 | End: 2023-05-10 | Stop reason: HOSPADM

## 2023-05-10 RX ORDER — MISOPROSTOL 200 UG/1
800 TABLET ORAL AS NEEDED
Status: DISCONTINUED | OUTPATIENT
Start: 2023-05-10 | End: 2023-05-10 | Stop reason: HOSPADM

## 2023-05-10 RX ORDER — SODIUM CHLORIDE 0.9 % (FLUSH) 0.9 %
3 SYRINGE (ML) INJECTION EVERY 12 HOURS SCHEDULED
Status: DISCONTINUED | OUTPATIENT
Start: 2023-05-10 | End: 2023-05-12 | Stop reason: HOSPADM

## 2023-05-10 RX ORDER — OXYTOCIN/0.9 % SODIUM CHLORIDE 30/500 ML
999 PLASTIC BAG, INJECTION (ML) INTRAVENOUS ONCE
Status: DISCONTINUED | OUTPATIENT
Start: 2023-05-10 | End: 2023-05-12 | Stop reason: HOSPADM

## 2023-05-10 RX ORDER — ONDANSETRON 2 MG/ML
4 INJECTION INTRAMUSCULAR; INTRAVENOUS ONCE
Status: COMPLETED | OUTPATIENT
Start: 2023-05-10 | End: 2023-05-10

## 2023-05-10 RX ORDER — DOCUSATE SODIUM 100 MG/1
100 CAPSULE, LIQUID FILLED ORAL 2 TIMES DAILY
Status: DISCONTINUED | OUTPATIENT
Start: 2023-05-10 | End: 2023-05-12 | Stop reason: HOSPADM

## 2023-05-10 RX ORDER — SIMETHICONE 80 MG
80 TABLET,CHEWABLE ORAL 4 TIMES DAILY PRN
Status: DISCONTINUED | OUTPATIENT
Start: 2023-05-10 | End: 2023-05-12 | Stop reason: HOSPADM

## 2023-05-10 RX ORDER — METHYLERGONOVINE MALEATE 0.2 MG/ML
200 INJECTION INTRAVENOUS ONCE AS NEEDED
Status: DISCONTINUED | OUTPATIENT
Start: 2023-05-10 | End: 2023-05-10 | Stop reason: HOSPADM

## 2023-05-10 RX ORDER — NALBUPHINE HCL 10 MG/ML
2.5 AMPUL (ML) INJECTION EVERY 4 HOURS PRN
Status: ACTIVE | OUTPATIENT
Start: 2023-05-10 | End: 2023-05-11

## 2023-05-10 RX ORDER — FAMOTIDINE 10 MG/ML
20 INJECTION, SOLUTION INTRAVENOUS ONCE
Status: COMPLETED | OUTPATIENT
Start: 2023-05-10 | End: 2023-05-10

## 2023-05-10 RX ORDER — OXYCODONE HYDROCHLORIDE 5 MG/1
10 TABLET ORAL EVERY 4 HOURS PRN
Status: DISCONTINUED | OUTPATIENT
Start: 2023-05-10 | End: 2023-05-12 | Stop reason: HOSPADM

## 2023-05-10 RX ORDER — TRISODIUM CITRATE DIHYDRATE AND CITRIC ACID MONOHYDRATE 500; 334 MG/5ML; MG/5ML
30 SOLUTION ORAL ONCE
Status: COMPLETED | OUTPATIENT
Start: 2023-05-10 | End: 2023-05-10

## 2023-05-10 RX ORDER — EPHEDRINE SULFATE 50 MG/ML
INJECTION INTRAVENOUS AS NEEDED
Status: DISCONTINUED | OUTPATIENT
Start: 2023-05-10 | End: 2023-05-10 | Stop reason: SURG

## 2023-05-10 RX ORDER — KETOROLAC TROMETHAMINE 15 MG/ML
15 INJECTION, SOLUTION INTRAMUSCULAR; INTRAVENOUS EVERY 6 HOURS
Status: COMPLETED | OUTPATIENT
Start: 2023-05-10 | End: 2023-05-11

## 2023-05-10 RX ORDER — PROMETHAZINE HYDROCHLORIDE 12.5 MG/1
12.5 SUPPOSITORY RECTAL EVERY 6 HOURS PRN
Status: DISCONTINUED | OUTPATIENT
Start: 2023-05-10 | End: 2023-05-12 | Stop reason: HOSPADM

## 2023-05-10 RX ADMIN — HYDROMORPHONE HYDROCHLORIDE 100 MCG: 1 INJECTION, SOLUTION INTRAMUSCULAR; INTRAVENOUS; SUBCUTANEOUS at 14:36

## 2023-05-10 RX ADMIN — OXYCODONE HYDROCHLORIDE 10 MG: 5 TABLET ORAL at 21:13

## 2023-05-10 RX ADMIN — FENTANYL CITRATE 15 MCG: 50 INJECTION, SOLUTION INTRAMUSCULAR; INTRAVENOUS at 14:36

## 2023-05-10 RX ADMIN — Medication 3 ML: at 21:03

## 2023-05-10 RX ADMIN — FAMOTIDINE 20 MG: 10 INJECTION INTRAVENOUS at 13:14

## 2023-05-10 RX ADMIN — ONDANSETRON 8 MG: 2 INJECTION INTRAMUSCULAR; INTRAVENOUS at 14:31

## 2023-05-10 RX ADMIN — CEFAZOLIN 2 G: 2 INJECTION, POWDER, FOR SOLUTION INTRAMUSCULAR; INTRAVENOUS at 14:19

## 2023-05-10 RX ADMIN — OXYTOCIN 125 ML/HR: 10 INJECTION, SOLUTION INTRAMUSCULAR; INTRAVENOUS at 17:45

## 2023-05-10 RX ADMIN — KETOROLAC TROMETHAMINE 15 MG: 15 INJECTION, SOLUTION INTRAMUSCULAR; INTRAVENOUS at 22:15

## 2023-05-10 RX ADMIN — SODIUM CHLORIDE, POTASSIUM CHLORIDE, SODIUM LACTATE AND CALCIUM CHLORIDE 125 ML/HR: 600; 310; 30; 20 INJECTION, SOLUTION INTRAVENOUS at 11:27

## 2023-05-10 RX ADMIN — DOCUSATE SODIUM 100 MG: 100 CAPSULE ORAL at 21:03

## 2023-05-10 RX ADMIN — NICOTINE 1 PATCH: 21 PATCH, EXTENDED RELEASE TRANSDERMAL at 17:10

## 2023-05-10 RX ADMIN — EPHEDRINE SULFATE 25 MG: 50 INJECTION INTRAVENOUS at 15:05

## 2023-05-10 RX ADMIN — FENTANYL CITRATE 85 MCG: 50 INJECTION, SOLUTION INTRAMUSCULAR; INTRAVENOUS at 15:04

## 2023-05-10 RX ADMIN — OXYCODONE HYDROCHLORIDE 5 MG: 5 TABLET ORAL at 17:07

## 2023-05-10 RX ADMIN — ACETAMINOPHEN 1000 MG: 500 TABLET, FILM COATED ORAL at 13:13

## 2023-05-10 RX ADMIN — ACETAMINOPHEN 1000 MG: 500 TABLET, FILM COATED ORAL at 19:52

## 2023-05-10 RX ADMIN — PRENATAL VIT W/ FE FUMARATE-FA TAB 27-0.8 MG 1 TABLET: 27-0.8 TAB at 19:52

## 2023-05-10 RX ADMIN — HYDROMORPHONE HYDROCHLORIDE 0.9 MG: 1 INJECTION, SOLUTION INTRAMUSCULAR; INTRAVENOUS; SUBCUTANEOUS at 15:10

## 2023-05-10 RX ADMIN — OXYTOCIN 30 UNITS: 10 INJECTION INTRAVENOUS at 14:56

## 2023-05-10 RX ADMIN — KETOROLAC TROMETHAMINE 30 MG: 30 INJECTION, SOLUTION INTRAMUSCULAR; INTRAVENOUS at 16:07

## 2023-05-10 RX ADMIN — ONDANSETRON 4 MG: 2 INJECTION INTRAMUSCULAR; INTRAVENOUS at 14:17

## 2023-05-10 RX ADMIN — SODIUM CHLORIDE, POTASSIUM CHLORIDE, SODIUM LACTATE AND CALCIUM CHLORIDE 125 ML/HR: 600; 310; 30; 20 INJECTION, SOLUTION INTRAVENOUS at 13:10

## 2023-05-10 RX ADMIN — METOCLOPRAMIDE HYDROCHLORIDE 10 MG: 5 INJECTION INTRAMUSCULAR; INTRAVENOUS at 13:13

## 2023-05-10 RX ADMIN — EPHEDRINE SULFATE 25 MG: 50 INJECTION INTRAVENOUS at 14:42

## 2023-05-10 RX ADMIN — SODIUM CITRATE AND CITRIC ACID MONOHYDRATE 30 ML: 500; 334 SOLUTION ORAL at 13:13

## 2023-05-10 NOTE — ANESTHESIA PREPROCEDURE EVALUATION
Anesthesia Evaluation     Patient summary reviewed and Nursing notes reviewed   NPO Solid Status: > 8 hours  NPO Liquid Status: > 4 hours           Airway   Mallampati: I  TM distance: >3 FB  Neck ROM: full  Dental - normal exam     Pulmonary - normal exam   (+) a smoker Current Smoked day of surgery,   Cardiovascular - negative cardio ROS and normal exam        Neuro/Psych  C-spine cleared  (+) psychiatric history Anxiety,    GI/Hepatic/Renal/Endo    (+)  GERD well controlled,      Musculoskeletal (-) negative ROS    Abdominal  - normal exam   Substance History - negative use     OB/GYN    (+) Pregnant,         Other                        Anesthesia Plan    ASA 2     spinal     (Lab Results       Component                Value               Date                       WBC                      12.70 (H)           05/10/2023                 HGB                      12.8                05/10/2023                 HCT                      38.4                05/10/2023                 MCV                      93.2                05/10/2023                 PLT                      219                 05/10/2023            Risk and benefits explained, patient understands and agrees to proceed with spinal )    Anesthetic plan, risks, benefits, and alternatives have been provided, discussed and informed consent has been obtained with: patient.  Pre-procedure education provided  Use of blood products discussed with consented to blood products.   Plan discussed with CRNA and Surgeon.        CODE STATUS:

## 2023-05-10 NOTE — H&P
[facility]  Jennifer Nicole  : 1990  MRN: [id number]  CSN: [id number]    History and Physical    Subjective   Jennifer Nicole is a 33 y.o. year old  with an Estimated Date of Delivery: 23 scheduled today for  delivery due to previous  section declines .  She is not planning for sterilization at the time of the .    Prenatal care has been with Dr. Alice Quesada.  It has been benign.    OB History    Para Term  AB Living   3 1 1 0 1 1   SAB IAB Ectopic Molar Multiple Live Births   1 0 0 0 0 1      # Outcome Date GA Lbr Scottie/2nd Weight Sex Delivery Anes PTL Lv   3 Current            2 Term 10/02/18 39w5d  3650 g (8 lb 0.8 oz) M CS-LTranv Spinal N SYED      Complications: Fetal Intolerance      Name: BAILEY LIU      Apgar1: 8  Apgar5: 9   1 SAB         FD     Past Medical History:   Diagnosis Date   • Abnormal Pap smear of cervix    • Anxiety    • Herpes labialis 2022   • Migraine    • Miscarriage    • Ovarian cyst      Past Surgical History:   Procedure Laterality Date   •  SECTION N/A 10/02/2018    Procedure:  SECTION PRIMARY;  Surgeon: Alice Quesada MD;  Location: [facility];  Service: Obstetrics/Gynecology   • CYST REMOVAL      2018       Current Facility-Administered Medications:   •  lactated ringers bolus 1,000 mL, 1,000 mL, Intravenous, Once, Alice Quesada MD, Held at 05/10/23 1316  •  lactated ringers bolus 1,000 mL, 1,000 mL, Intravenous, Once, Osmani Salguero CRNA, Held at 05/10/23 1316  •  lactated ringers infusion, 125 mL/hr, Intravenous, Continuous, Alice Quesada MD, Last Rate: 125 mL/hr at 05/10/23 1310, 125 mL/hr at 05/10/23 1310  •  lidocaine PF 1% (XYLOCAINE) injection 5 mL, 5 mL, Intradermal, PRN, Alice Quesada MD  •  ondansetron (ZOFRAN) injection 4 mg, 4 mg, Intravenous, Once, Osmani Salguero CRNA  •  sodium chloride 0.9 % flush 10 mL, 10 mL, Intravenous,  "Sangita NUNO Amber, MD  •  sodium chloride 0.9 % flush 10 mL, 10 mL, Intravenous, Q12H, Osmani Salguero CRNA  •  sodium chloride 0.9 % flush 10 mL, 10 mL, Intravenous, PRN, Osmani Salguero CRNA  •  sodium chloride 0.9 % flush 3 mL, 3 mL, Intravenous, Q12H, Alice Quesada MD  Family History   Problem Relation Age of Onset   • Thyroid cancer Father    • Colon polyps Mother    • Hypertension Paternal Grandfather    • Hypertension Paternal Grandmother    • Hypertension Maternal Grandmother    • Breast cancer Maternal Aunt    • Brain cancer Paternal Uncle        Allergies   Allergen Reactions   • Septra [Sulfamethoxazole-Trimethoprim] Anaphylaxis   • Sulfa Antibiotics Anaphylaxis     Social History    Tobacco Use      Smoking status: Every Day        Packs/day: 1.00        Types: Cigarettes        Last attempt to quit: 2018        Years since quittin.3      Smokeless tobacco: Never    Review of Systems   Constitutional: Negative for activity change and unexpected weight change.   Respiratory: Negative for shortness of breath.    Cardiovascular: Negative for chest pain.   Gastrointestinal: Negative for abdominal pain.   Genitourinary: Negative for pelvic pain, vaginal bleeding and vaginal discharge.         Objective   /70 (BP Location: Right arm, Patient Position: Lying)   Pulse 79   Resp 18   Ht 157.5 cm (62\")   Wt 86.2 kg (190 lb)   LMP 08/10/2022 (Exact Date)   SpO2 99%   Breastfeeding Yes   BMI 34.75 kg/m²   General: well developed; well nourished  no acute distress   Heart: Not performed.   Lungs: breathing is unlabored   Abdomen: soft, non-tender; no masses  fundus firm and non-tender     Cervix:   Not examined    Prenatal Labs  Lab Results   Component Value Date    HGB 12.8 05/10/2023    HEPBSAG Negative 10/03/2022    ABO A 05/10/2023    RH Positive 05/10/2023    ABSCRN Negative 05/10/2023    SRK1EFN1 Non Reactive 10/03/2022    HEPCVIRUSABY <0.1 10/03/2022    URINECX Final report " (A) 10/03/2022       Recent Labs  Lab Results   Component Value Date    HGB 12.8 05/10/2023    HCT 38.4 05/10/2023    WBC 12.70 (H) 05/10/2023     05/10/2023           Assessment   1. IUP with an Estimated Date of Delivery: 23  2. Planned  section today for previous  section declines .  3. Fetus reactive and reassuring     Plan   1. Repeat     Alice Quesada MD  5/10/2023

## 2023-05-10 NOTE — PLAN OF CARE
Goal Outcome Evaluation:  Plan of Care Reviewed With: patient, significant other           Outcome Evaluation: Pt delivered via  section.  Low transverse uterine incision. Pt breastfeeding.  Pt has had Tylenol and Toradol.

## 2023-05-10 NOTE — ANESTHESIA PROCEDURE NOTES
Spinal Block      Patient location during procedure: OB  Indication:at surgeon's request  Performed By  CRNA/CASSIE: Osmani Salguero CRNA  Preanesthetic Checklist  Completed: patient identified, IV checked, site marked, risks and benefits discussed, surgical consent, monitors and equipment checked, pre-op evaluation and timeout performed  Spinal Block Prep:  Patient Position:sitting  Sterile Tech:cap, gloves, mask and sterile barriers  Prep:Chloraprep  Patient Monitoring:blood pressure monitoring, continuous pulse oximetry and EKG    Spinal Block Procedure  Approach:midline  Guidance:landmark technique  Location:L3-L4  Needle Type:Pencan  Needle Gauge:25 G  Placement of Spinal needle event:cerebrospinal fluid aspirated  Paresthesia: no  Fluid Appearance:clear     Post Assessment  Patient Tolerance:patient tolerated the procedure well with no apparent complications  Complications no

## 2023-05-10 NOTE — OP NOTE
Holly Nicole  : 1990  MRN: [id number]  CSN: [id number]     Section Operative Note    Pre-Operative Dx:   1. Intrauterine pregnancy at 39w0d weeks   2. previous  section, desires repeat C/S      Postoperative dx:    1. Same as above     Procedure: Repeat low transverse -section (LTCS)       Surgeon: Alice Quesada MD           Anesthesia: Spinal        QBL: 400 mls.   IV Fluids: 1000 mls.   UOP: clear mls.     Antibiotics: cefazolin 2 gms     Infant:           Gender: male  infant    Weight: 3200 g (7 lb 0.9 oz)     Apgars: 9  @ 1 minute / 9  @ 5 minutes    Cord gases: Venous:No results found for: PHCVEN, OXH2IOGO, UJ0ZFFC, KIF8MSRM, BECVEN, Q7DQMMNML     Arterial:No results found for: PHCART, QQK8ATZH, CK2LUNC, YMD7SFUX, BECART, R2BUUFHXS     Procedure Details:   The patient was taken to the OR where she was prepped and draped in the usual sterile fashion in the dorsal supine position with a leftward lateral tilt.  Her previous Pfannenstiel incision was elliptically excised with a knife. The incision was carried down to the underlying fascia sharply.  The fascia was incised in the midline with the scalpel and this incision further developed using the fascial rip technique. The fascia was freed from its midline insertion superiorly and inferiorly. Rectus muscles were  in the midline. The peritoneum was entered bluntly, and the peritoneal window further developed using blunt stretching.  A medium Protractor was placed to aid with visualization.  A bladder flap was created sharply. The lower uterine segment was incised in a transverse fashion with the scalpel, and the uterine incision further developed with blunt stretching. Clear amniotic fluid was noted. The infant's head was delivered atraumatically. The mouth and nose were bulb suctioned.  The cord was clamped and cut after a 60 second delay.  The infant was then handed off to waiting NICU staff.  The placenta  was allowed to deliver spontaneously. The uterus was cleared of clot and debris with moist laparotomy sponges. The uterine incision was closed with #0 vicryl in a continuous running locked fashion.  A second layer of #0 vicryl in a running fashion was used to imbricate the first.  Upon careful inspection, the uterine incision was noted to be hemostatic.  The bladder flap was not reapproximated.    The adnexa were carefully inspected and noted to be normal, bilaterally.    The paracolic gutters were cleared of clot and debris with moist laparotomy sponges. The uterine incision was inspected one final time and found to be hemostatic.  Kishan hemostatic matrix was applied over the hysterotomy incision to prevent adhesion formation during healing. The fascia was reapproximated  with #0 vicryl in a running fashion.  Roxy's fascia was loosely reapproximated with 3-0 vicryl and the skin was closed with 4-0 monocryl using a subcuticular stitch. Skin glue was applied bandage.  All counts were correct X2.         Complications:   None      Disposition:   Mother to Mother Baby/Postpartum  in stable condition currently.   Baby to remains with mom  in stable condition currently.       Alice Quesada MD  5/10/2023  15:24 CDT

## 2023-05-10 NOTE — LACTATION NOTE
Mother's Name: Jennifer Phone #:[phone]  Infant Name:  Jose :5/10/2021  Gestation:39w0d  Day of life:0  Birth weight:  7-0.9 (3200g) Discharge weight:  Weight Loss:   24 hour Summary of Feeds:  Voids:  Stools:  Assistive devices (shields, shells, etc):na  Significant Maternal history:,  first child for 16 months used nipple shield for  3 months   Maternal Concerns:  denies  Maternal Goal: exclusive breastfeeding for 6 months -1 year  Mother's Medications: PNV, Valtrex, Buspar  Breastpump for home: Zoomie  Ped follow up appt:    Assist with first feeding in recovery. Infant resting skin to skin with mother at 50 mins of life. Initial breastfeeding packet given and briefly reviewed. Breastfeeding book provided. Infant then brought to right breast in cradle hold. Infant irritable, requiring several moments to calm after being moved. Hand expressed drops to infant. Infant will latch easily but pushes away from breast frequently, becoming fussy off and on. Moved to football hold on left breast, after several attempts infant calmed to achieve latch, nursed off and on for 20 mins before becoming increasingly fussy once more. Moved to rest skin to skin with mother. Hand expressed multiple drops, provided to infant. Encourage hand expression and skin to skin if infant reluctant to feed. Encouragement and support provided.     Instructed mom our lactation team is here for continued support throughout their breastfeeding journey. Our team has encouraged mom to call with any questions or concerns that may arise after discharge.    Breastfeeding and Diaper Chart  Check List for Essentials of Positioning And Latch-on handout provided by Lactation Education Resources  Hand Expression handout provided by Lactation Education Resources  Five Keys to Successful Breastfeeding handout provided by Lactation Education Resources    The Many Benefits if Breastfeeding handout given  Breastfeeding saves  time  *Breastfeeding allows you to calm or feed your baby immediately, which leads to a happier baby who cries less  *There is nothing to buy, prepare, or maintain.There is nothing to clean or sterilize.  Breastfeeding builds a mothers confidence  *She knows all her baby needs to thrive is her!  Breastfeeding saves Money  *There is no formula to buy and healthier breast fed babies have less medical costs  Healthy Mom/Healthy baby  * babies get sick less often, and when they do they are usually sick less severely and for a shorter time  * babies have fewer ear infections  * babies have fewer allergies  *Mothers who breastfeed have a lower risk for cancer, osteoporosis, anemia, high blood pressure, obesity, and Type ll diabetes  *Mothers miss less work days with sick babies  Breast fed babies have a better dental health  * babies have better jaw development which requires lest orthodontic work  *Breast milk does not promote cavities  * babies can nurse at night without worry of tooth decay  Breastfeeding allows a baby to reach his full IQ potential  *The longer a baby is breast fed, the better their brain development  Breast fed babies and moms are more relaxed  *The hormones released during breastfeeding have a calming effect on mothers  *Breastfeeding requires mom to take a break; this may help mom get more rest after delivery  *Breastfeeding is quicker than preparing formula which allows mom and baby to get back to sleep faster  *Breastfeeding promotes bonding and allows mom to learn babies cues and care needs more quickly  Breastfeeding cleanup is easier  *The bowel movements and spit up of breast fed babies doesn't smell as bad  *Spit-up of breast fed babies doesn't stain clothing  Getting out of the hourse is easier  *No formula bottles to prepare and carry safely   *No time restraints due to worry about what baby will eat  *No worries about warming a bottle or  finding safe water to prepare bottles  Breastfeeding mother get their bodies back sooner  *The uterus shrinks more quickly and completely, which allows a flatter tummy  *Breastfeeding burns 400-500 calories a day; making milk torches stored fat!  Breastfeeding is better for the environment  *There is no trash to dispose of after breastfeeding  *There is no production facility to produce breast milk; moms body does it all without the pollution of a factory    Educational Breastfeeding Videos on   YouTube  (length of video in minutes)    • Expressing the First Milk - Small Baby Series (7:19)  • Hand Expression [facility] (7:34)  • Attaching Your Baby at the Breast - Breastfeeding Series (10:26)  • The Power of Pumping - [facility]   • Maximizing Production [facility] (9:35)  • Instructions for use Medela Symphony breastpump (English) (1:58)  • Medela 2-Phase Expression (4:05)  • Medela double pumping video (2:19)  • Choosing your PersonalFit breast shield size (3:04)  We also recommend visiting www.[employer] for valuable education and videos on breastfeeding full term AND  infants. This is a great resource to begin learning about breastfeeding during pregnancy as well.                [facility]             [phone]  Breastfeeding A Great Start Book by Christy Clement RN, CE, ICD and PHILLIP Deng MD, FACOG    [facility] by [facility]    Freshly Expressed Breastmilk Storage Guidelines for Healthy Term Babies References: www.BreastmilkGuidelines.com

## 2023-05-11 LAB
BASOPHILS # BLD AUTO: 0.04 10*3/MM3 (ref 0–0.2)
BASOPHILS NFR BLD AUTO: 0.3 % (ref 0–1.5)
DEPRECATED RDW RBC AUTO: 50 FL (ref 37–54)
EOSINOPHIL # BLD AUTO: 0.09 10*3/MM3 (ref 0–0.4)
EOSINOPHIL NFR BLD AUTO: 0.7 % (ref 0.3–6.2)
ERYTHROCYTE [DISTWIDTH] IN BLOOD BY AUTOMATED COUNT: 14.2 % (ref 12.3–15.4)
HCT VFR BLD AUTO: 34.1 % (ref 34–46.6)
HGB BLD-MCNC: 11.1 G/DL (ref 12–15.9)
IMM GRANULOCYTES # BLD AUTO: 0.21 10*3/MM3 (ref 0–0.05)
IMM GRANULOCYTES NFR BLD AUTO: 1.6 % (ref 0–0.5)
LYMPHOCYTES # BLD AUTO: 1.71 10*3/MM3 (ref 0.7–3.1)
LYMPHOCYTES NFR BLD AUTO: 12.9 % (ref 19.6–45.3)
MCH RBC QN AUTO: 30.9 PG (ref 26.6–33)
MCHC RBC AUTO-ENTMCNC: 32.6 G/DL (ref 31.5–35.7)
MCV RBC AUTO: 95 FL (ref 79–97)
MONOCYTES # BLD AUTO: 1.01 10*3/MM3 (ref 0.1–0.9)
MONOCYTES NFR BLD AUTO: 7.6 % (ref 5–12)
NEUTROPHILS NFR BLD AUTO: 10.17 10*3/MM3 (ref 1.7–7)
NEUTROPHILS NFR BLD AUTO: 76.9 % (ref 42.7–76)
NRBC BLD AUTO-RTO: 0 /100 WBC (ref 0–0.2)
PLATELET # BLD AUTO: 200 10*3/MM3 (ref 140–450)
PMV BLD AUTO: 9.8 FL (ref 6–12)
RBC # BLD AUTO: 3.59 10*6/MM3 (ref 3.77–5.28)
WBC NRBC COR # BLD: 13.23 10*3/MM3 (ref 3.4–10.8)

## 2023-05-11 PROCEDURE — 25010000002 KETOROLAC TROMETHAMINE PER 15 MG: Performed by: OBSTETRICS & GYNECOLOGY

## 2023-05-11 PROCEDURE — 85025 COMPLETE CBC W/AUTO DIFF WBC: CPT | Performed by: OBSTETRICS & GYNECOLOGY

## 2023-05-11 RX ORDER — POLYETHYLENE GLYCOL 3350 17 G/17G
17 POWDER, FOR SOLUTION ORAL DAILY
Status: DISCONTINUED | OUTPATIENT
Start: 2023-05-11 | End: 2023-05-12 | Stop reason: HOSPADM

## 2023-05-11 RX ADMIN — OXYCODONE HYDROCHLORIDE 10 MG: 5 TABLET ORAL at 08:25

## 2023-05-11 RX ADMIN — DOCUSATE SODIUM 100 MG: 100 CAPSULE ORAL at 08:07

## 2023-05-11 RX ADMIN — KETOROLAC TROMETHAMINE 15 MG: 15 INJECTION, SOLUTION INTRAMUSCULAR; INTRAVENOUS at 16:29

## 2023-05-11 RX ADMIN — OXYCODONE HYDROCHLORIDE 10 MG: 5 TABLET ORAL at 02:33

## 2023-05-11 RX ADMIN — OXYCODONE HYDROCHLORIDE 10 MG: 5 TABLET ORAL at 14:15

## 2023-05-11 RX ADMIN — PRENATAL VIT W/ FE FUMARATE-FA TAB 27-0.8 MG 1 TABLET: 27-0.8 TAB at 08:07

## 2023-05-11 RX ADMIN — ACETAMINOPHEN 1000 MG: 500 TABLET, FILM COATED ORAL at 12:57

## 2023-05-11 RX ADMIN — OXYCODONE HYDROCHLORIDE 10 MG: 5 TABLET ORAL at 18:58

## 2023-05-11 RX ADMIN — ACETAMINOPHEN 650 MG: 325 TABLET, FILM COATED ORAL at 18:58

## 2023-05-11 RX ADMIN — ACETAMINOPHEN 1000 MG: 500 TABLET, FILM COATED ORAL at 06:46

## 2023-05-11 RX ADMIN — ACETAMINOPHEN 1000 MG: 500 TABLET, FILM COATED ORAL at 01:08

## 2023-05-11 RX ADMIN — IBUPROFEN 600 MG: 600 TABLET, FILM COATED ORAL at 21:18

## 2023-05-11 RX ADMIN — POLYETHYLENE GLYCOL 3350 17 G: 17 POWDER, FOR SOLUTION ORAL at 09:48

## 2023-05-11 RX ADMIN — DOCUSATE SODIUM 100 MG: 100 CAPSULE ORAL at 21:18

## 2023-05-11 RX ADMIN — NICOTINE 1 PATCH: 21 PATCH, EXTENDED RELEASE TRANSDERMAL at 09:54

## 2023-05-11 RX ADMIN — KETOROLAC TROMETHAMINE 15 MG: 15 INJECTION, SOLUTION INTRAMUSCULAR; INTRAVENOUS at 09:48

## 2023-05-11 RX ADMIN — KETOROLAC TROMETHAMINE 15 MG: 15 INJECTION, SOLUTION INTRAMUSCULAR; INTRAVENOUS at 04:38

## 2023-05-11 NOTE — CASE MANAGEMENT/SOCIAL WORK
Order: EPDS 13.  Pt was not in room earlier, just called and no answer.  Did leave message on cell listed for patient to call this SW back.     Patient called this SW back.  She lives with spouse and 4 year old son, now with  son.  She says she has family support and they do have needed baby supplies.  She was encouraged to reach out to physician if depression does not get better or worsens.  She has no other concerns at this time.     normal...

## 2023-05-11 NOTE — PLAN OF CARE
Problem: Adult Inpatient Plan of Care  Goal: Plan of Care Review  Outcome: Ongoing, Progressing  Flowsheets (Taken 5/11/2023 1816)  Progress: improving  Plan of Care Reviewed With: patient  Outcome Evaluation: VSS. FFMLU2 scant lochia. Voiding and ambulating. Passing flatus. ALT incision clean, dry, and intact. No s/s infection. ERAS and prn pain medication given for pain control. Breastfeeding and bonding well with infant. Spouse has remained at bedside.   Goal Outcome Evaluation:  Plan of Care Reviewed With: patient        Progress: improving  Outcome Evaluation: VSS. FFMLU2 scant lochia. Voiding and ambulating. Passing flatus. ALT incision clean, dry, and intact. No s/s infection. ERAS and prn pain medication given for pain control. Breastfeeding and bonding well with infant. Spouse has remained at bedside.

## 2023-05-11 NOTE — ANESTHESIA POSTPROCEDURE EVALUATION
Patient: Jennifer Nicole    Procedure Summary     Date: 05/10/23 Room / Location: [facility] LABOR DELIVERY 1 / [facility] LABOR DELIVERY    Anesthesia Start: 1430 Anesthesia Stop: 152    Procedure:  SECTION REPEAT (Abdomen) Diagnosis:       History of  delivery, currently pregnant      (History of  delivery, currently pregnant [O34.219])    Surgeons: Alice Quesada MD Provider: Osmani Salguero CRNA    Anesthesia Type: spinal ASA Status: 2          Anesthesia Type: spinal    Vitals  Vitals Value Taken Time   /53 23 0803   Temp 97.4 °F (36.3 °C) 23 0803   Pulse 72 23 0803   Resp 18 23 0803   SpO2 98 % 23 0803           Post Anesthesia Care and Evaluation    Patient location during evaluation: floor  Patient participation: complete - patient participated  Level of consciousness: awake and alert  Pain management: adequate    Airway patency: patent  Anesthetic complications: No anesthetic complications  PONV Status: none  Cardiovascular status: acceptable  Respiratory status: acceptable  Hydration status: acceptable  Post Neuraxial Block status: Motor and sensory function returned to baseline and No signs or symptoms of PDPH  Comments: No questions/concern from patient. Ambulating without issues.  No anesthesia care post op

## 2023-05-11 NOTE — PROGRESS NOTES
YVON Delgadillo  [facility]  [address]  [place] [address]  Office [phone]  Fax [phone]      [facility]   PROGRESS NOTE    Post-Op Day 1 S/P   Subjective     Patient reports:  Pain is not well contrlled with scheduled Tylenol and Toradol with Roxicodone as needed. She will try the Roxicodone again and if pain not improved, she will notify the nurse of uncontrolled pain.  She is ambulating. Tolerating diet. Voiding - due to void status post weiss catheter removal; no flatus yet reported..  Vaginal bleeding is light.      Objective      Vitals: Vital Signs Range for the last 24 hours  Temperature: Temp:  [97.4 °F (36.3 °C)-98.5 °F (36.9 °C)] 97.4 °F (36.3 °C)   Temp Source: Temp src: Temporal   BP: BP: ()/(42-72) 101/53   Pulse: Heart Rate:  [72-99] 72   Respirations: Resp:  [16-18] 18   SPO2: SpO2:  [96 %-100 %] 98 %   O2 Amount (l/min):     O2 Devices Device (Oxygen Therapy): room air   Weight: Weight:  [86.2 kg (190 lb)] 86.2 kg (190 lb)            Physical Exam    Lungs respirations even and unlabored   Abdomen Soft, non-tender.   Incision  no drainage, no erythema, no swelling, well approximated, skin glue dressing intact   Extremities extremities normal, atraumatic, no cyanosis or edema     I reviewed the patient's new clinical results.  Lab Results (last 24 hours)     Procedure Component Value Units Date/Time    CBC & Differential [[id number]]  (Abnormal) Collected: 23    Specimen: Blood Updated: 23    Narrative:      The following orders were created for panel order CBC & Differential.  Procedure                               Abnormality         Status                     ---------                               -----------         ------                     CBC Auto Differential[[id number]]        Abnormal            Final result                 Please view results for these tests on the individual orders.    CBC Auto Differential  [[id number]]  (Abnormal) Collected: 05/11/23 0637    Specimen: Blood Updated: 05/11/23 0659     WBC 13.23 10*3/mm3      RBC 3.59 10*6/mm3      Hemoglobin 11.1 g/dL      Hematocrit 34.1 %      MCV 95.0 fL      MCH 30.9 pg      MCHC 32.6 g/dL      RDW 14.2 %      RDW-SD 50.0 fl      MPV 9.8 fL      Platelets 200 10*3/mm3      Neutrophil % 76.9 %      Lymphocyte % 12.9 %      Monocyte % 7.6 %      Eosinophil % 0.7 %      Basophil % 0.3 %      Immature Grans % 1.6 %      Neutrophils, Absolute 10.17 10*3/mm3      Lymphocytes, Absolute 1.71 10*3/mm3      Monocytes, Absolute 1.01 10*3/mm3      Eosinophils, Absolute 0.09 10*3/mm3      Basophils, Absolute 0.04 10*3/mm3      Immature Grans, Absolute 0.21 10*3/mm3      nRBC 0.0 /100 WBC     CBC (No Diff) [[id number]]  (Abnormal) Collected: 05/10/23 1132    Specimen: Blood Updated: 05/10/23 1139     WBC 12.70 10*3/mm3      RBC 4.12 10*6/mm3      Hemoglobin 12.8 g/dL      Hematocrit 38.4 %      MCV 93.2 fL      MCH 31.1 pg      MCHC 33.3 g/dL      RDW 14.4 %      RDW-SD 49.1 fl      MPV 10.0 fL      Platelets 219 10*3/mm3           External Prenatal Results     Pregnancy Outside Results - Transcribed From Office Records - See Scanned Records For Details     Test Value Date Time    ABO  A  05/10/23 1132    Rh  Positive  05/10/23 1132    Antibody Screen  Negative  05/10/23 1132       Negative  10/03/22 1329    Varicella IgG  540 index 10/03/22 1329    Rubella  8.15 index 10/03/22 1329    Hgb  11.1 g/dL 05/11/23 0637       12.8 g/dL 05/10/23 1132       11.3 g/dL 03/01/23 0840       12.8 g/dL 10/03/22 1329    Hct  34.1 % 05/11/23 0637       38.4 % 05/10/23 1132       38.2 % 10/03/22 1329    Glucose Fasting GTT  76 mg/dL 08/10/18 0922    Glucose Tolerance Test 1 hour  137 mg/dL 08/10/18 0922    Glucose Tolerance Test 3 hour  124 mg/dL 08/10/18 0922    Gonorrhea (discrete)  Negative  10/03/22 1329    Chlamydia (discrete)  Negative  10/03/22 1329    RPR  Non Reactive  10/03/22 1329     VDRL       Syphilis Antibody       HBsAg  Negative  10/03/22 1329    Herpes Simplex Virus PCR       Herpes Simplex VIrus Culture       HIV  Non Reactive  10/03/22 1329    Hep C RNA Quant PCR       Hep C Antibody  <0.1 s/co ratio 10/03/22 1329    AFP       Group B Strep  Negative  18 1321    GBS Susceptibility to Clindamycin       GBS Susceptibility to Erythromycin       Fetal Fibronectin       Genetic Testing, Maternal Blood             Drug Screening     Test Value Date Time    Urine Drug Screen       Amphetamine Screen       Barbiturate Screen       Benzodiazepine Screen       Methadone Screen       Phencyclidine Screen       Opiates Screen       THC Screen       Cocaine Screen       Propoxyphene Screen       Buprenorphine Screen       Methamphetamine Screen       Oxycodone Screen       Tricyclic Antidepressants Screen             Legend    ^: Historical                        Assessment & Plan        * No active hospital problems. *      Assessment:    Jennifer Nicole is Day 1  post-partum  , Low Transverse   .       Plan:  continue post op care.        This note has been signed electronically.    Marjorie Ch, DNP, APRN, CNM, RNC-OB  2023  08:05 CDT

## 2023-05-11 NOTE — PLAN OF CARE
Goal Outcome Evaluation:      VSS. Davis cath out at 0456. Due to void. Ambulated in room and down hallway. Scheduled and PRN pain meds given this shift. FF, ML, U1, scant lochia. Breastfeeding and pumping, giving PO breast milk. Has nipple shield. EPDS 13, consult in per protocol. Special meal paper turned in. Spouse and mother at bedside, attentive to patient. Attentive to infant cues.

## 2023-05-11 NOTE — LACTATION NOTE
Mother's Name: Jennifer Phone #:[phone]  Infant Name:  Jose :5/10/2021  Gestation:39w0d  Day of life:1  Birth weight:  7-0.9 (3200g) Discharge weight:  Weight Loss: -3.6%  24 hour Summary of Feeds: 3BF  Attempt x2  EBM 4.05ml  Voids:4  Stools:2  Assistive devices (shields, shells, etc):nipple shield  Significant Maternal history:,  first child for 16 months used nipple shield for  3 months   Maternal Concerns:  denies  Maternal Goal: exclusive breastfeeding for 6 months -1 year  Mother's Medications: PNV, Valtrex, Buspar  Breastpump for home: Zoomie  Ped follow up appt:    Mother states breastfeeding not going well. Infant has continued with similar behavior as after delivery. Very fussy, reluctant to latch/suck. Most content when left alone. Mother states she has performed hand expression and pumping, feels she gets more from pumping. Praise provided. Encouraged mother to continue breastfeeding attempts every 3 hours, encourage lots of skin to skin, hand expression and pumping to provide adequate EBM to infant until he is ready to participate infant direct breast feedings. Discussed infant belly size and average feedings for 1 day old. Encourage mother to call for lactation assistance as much as needed today. Mother states infants suck has improved as far as, he will appropriate suck on pacifier and is able to maintain latch to paci. Acknowledge this as a good sign, likely frenulum not restrictive enough to affect suck. Reiterated infant still learning and adjusting, lots of time bonding with parents recommended.     Instructed mom our lactation team is here for continued support throughout their breastfeeding journey. Our team has encouraged mom to call with any questions or concerns that may arise after discharge.

## 2023-05-12 VITALS
SYSTOLIC BLOOD PRESSURE: 115 MMHG | HEART RATE: 83 BPM | TEMPERATURE: 98 F | BODY MASS INDEX: 34.96 KG/M2 | WEIGHT: 190 LBS | OXYGEN SATURATION: 98 % | DIASTOLIC BLOOD PRESSURE: 58 MMHG | RESPIRATION RATE: 18 BRPM | HEIGHT: 62 IN

## 2023-05-12 RX ORDER — CYCLOBENZAPRINE HCL 10 MG
10 TABLET ORAL EVERY 8 HOURS PRN
Qty: 30 TABLET | Refills: 0 | Status: SHIPPED | OUTPATIENT
Start: 2023-05-12

## 2023-05-12 RX ORDER — OXYCODONE HYDROCHLORIDE 5 MG/1
5 TABLET ORAL EVERY 6 HOURS PRN
Qty: 12 TABLET | Refills: 0 | Status: SHIPPED | OUTPATIENT
Start: 2023-05-12

## 2023-05-12 RX ORDER — IBUPROFEN 200 MG
400 TABLET ORAL EVERY 4 HOURS PRN
Start: 2023-05-12

## 2023-05-12 RX ORDER — ACETAMINOPHEN 325 MG/1
650 TABLET ORAL EVERY 6 HOURS PRN
Start: 2023-05-12

## 2023-05-12 RX ADMIN — ACETAMINOPHEN 650 MG: 325 TABLET, FILM COATED ORAL at 00:17

## 2023-05-12 RX ADMIN — POLYETHYLENE GLYCOL 3350 17 G: 17 POWDER, FOR SOLUTION ORAL at 08:07

## 2023-05-12 RX ADMIN — PRENATAL VIT W/ FE FUMARATE-FA TAB 27-0.8 MG 1 TABLET: 27-0.8 TAB at 08:08

## 2023-05-12 RX ADMIN — OXYCODONE HYDROCHLORIDE 10 MG: 5 TABLET ORAL at 12:09

## 2023-05-12 RX ADMIN — OXYCODONE HYDROCHLORIDE 10 MG: 5 TABLET ORAL at 00:17

## 2023-05-12 RX ADMIN — DOCUSATE SODIUM 100 MG: 100 CAPSULE ORAL at 08:08

## 2023-05-12 RX ADMIN — OXYCODONE HYDROCHLORIDE 10 MG: 5 TABLET ORAL at 06:06

## 2023-05-12 RX ADMIN — ACETAMINOPHEN 650 MG: 325 TABLET, FILM COATED ORAL at 06:06

## 2023-05-12 RX ADMIN — IBUPROFEN 600 MG: 600 TABLET, FILM COATED ORAL at 03:43

## 2023-05-12 RX ADMIN — IBUPROFEN 600 MG: 600 TABLET, FILM COATED ORAL at 09:51

## 2023-05-12 NOTE — NURSING NOTE
RN to bedside to educate pt  on discharge instructions written and verbal.  Pt discharged home in stable condition.  No S/S of distress noted.

## 2023-05-12 NOTE — NURSING NOTE
This patient attended the Discharge Teaching Class during their stay. Information was taught out of the Postpartum and Loop Care booklet. Topics included in the discharge class are:    - Uterine and lochia changes  - Possible Postpartum Complications  - Perineal and Incisional care including Pelvic Rest  - Postpartum Depression and Anxiety  - Importance of notifying MD if any complications or concerns arise.     Additional topics included in the class related to  Care are:    - Caring for a  including use of bulb syringe  - Changes in  behavior  - Safe sleep and safety precautions  - Shaken Baby Syndrome  - Importance of notifying MD if concerns or complications arise.

## 2023-05-12 NOTE — PLAN OF CARE
Goal Outcome Evaluation:  Plan of Care Reviewed With: patient        Progress: improving  Outcome Evaluation: vitals stable, fundus and lochia wnl, voiding , passing gas, ERAS and prn pain meds control pain, breastfeeding, bonding well infant

## 2023-05-12 NOTE — PROGRESS NOTES
YVON Delgadillo  [facility]  [address]  [place] [address]  Office [phone]  Fax [phone]      [facility]   PROGRESS NOTE    Post-Op Day 2 S/P   Subjective     Patient reports:  Pain is controlled with Scheduled Motrin and Tylenol with Roxicodone as needed.  She is ambulating. Tolerating diet. Voiding - without difficulty; flatus reported..  Vaginal bleeding is light.      Objective      Vitals: Vital Signs Range for the last 24 hours  Temperature: Temp:  [97.9 °F (36.6 °C)-98.4 °F (36.9 °C)] 98 °F (36.7 °C)   Temp Source: Temp src: Temporal   BP: BP: (115-125)/(55-68) 115/58   Pulse: Heart Rate:  [] 83   Respirations: Resp:  [16-18] 18   SPO2: SpO2:  [97 %-99 %] 98 %   O2 Amount (l/min):     O2 Devices Device (Oxygen Therapy): room air   Weight:              Physical Exam    Lungs Respirations even and unlabored   Abdomen Soft, non-tender.   Incision  healing well, no drainage, no erythema, no swelling, well approximated, skin glue dressing intact   Extremities extremities normal, atraumatic, no cyanosis or edema     I reviewed the patient's new clinical results.  Lab Results (last 24 hours)     ** No results found for the last 24 hours. **          External Prenatal Results     Pregnancy Outside Results - Transcribed From Office Records - See Scanned Records For Details     Test Value Date Time    ABO  A  05/10/23 1132    Rh  Positive  05/10/23 1132    Antibody Screen  Negative  05/10/23 1132       Negative  10/03/22 1329    Varicella IgG  540 index 10/03/22 1329    Rubella  8.15 index 10/03/22 1329    Hgb  11.1 g/dL 23 0637       12.8 g/dL 05/10/23 1132       11.3 g/dL 23 0840       12.8 g/dL 10/03/22 1329    Hct  34.1 % 23 0637       38.4 % 05/10/23 1132       38.2 % 10/03/22 1329    Glucose Fasting GTT  76 mg/dL 08/10/18 0922    Glucose Tolerance Test 1 hour  137 mg/dL 08/10/18 0922    Glucose Tolerance Test 3 hour  124 mg/dL 08/10/18  0922    Gonorrhea (discrete)  Negative  10/03/22 1329    Chlamydia (discrete)  Negative  10/03/22 1329    RPR  Non Reactive  10/03/22 1329    VDRL       Syphilis Antibody       HBsAg  Negative  10/03/22 1329    Herpes Simplex Virus PCR       Herpes Simplex VIrus Culture       HIV  Non Reactive  10/03/22 1329    Hep C RNA Quant PCR       Hep C Antibody  <0.1 s/co ratio 10/03/22 1329    AFP       Group B Strep  Negative  18 1321    GBS Susceptibility to Clindamycin       GBS Susceptibility to Erythromycin       Fetal Fibronectin       Genetic Testing, Maternal Blood             Drug Screening     Test Value Date Time    Urine Drug Screen       Amphetamine Screen       Barbiturate Screen       Benzodiazepine Screen       Methadone Screen       Phencyclidine Screen       Opiates Screen       THC Screen       Cocaine Screen       Propoxyphene Screen       Buprenorphine Screen       Methamphetamine Screen       Oxycodone Screen       Tricyclic Antidepressants Screen             Legend    ^: Historical                        Assessment & Plan        * No active hospital problems. *      Assessment:    Jennifer Nicole is Day 2  post-partum  , Low Transverse   .       Plan:  continue post op care and plan for discharge today.  Discussed and encouraged outpatient lactation consultation as needed.      Plan for discharge reviewed with Dr. Robin.    This note has been signed electronically.    Marjorie Ch, DNP, APRN, CNM, RNC-OB  2023  09:47 CDT

## 2023-05-12 NOTE — DISCHARGE SUMMARY
Marjorie Ch, YVON  [facility]  [address]  [place] [address]  Office [phone]  Fax [phone]    Discharge Summary    [facility]  Jennifer Nicole  : 1990  MRN: [id number]  CSN: [id number]    Date of Admission: 5/10/2023   Date of Discharge:  2023   Delivering Physician: Alice Quesada MD       Admission Diagnosis: 1. History of  delivery, currently pregnant [O34.219]  2. Previous  delivery affecting pregnancy [O34.219]   Discharge Diagnosis: 1. Pregnancy at 39w0d - delivered       Procedures: 1. Repeat  (LTCS)     Hospital Course  See the completed operative report for details regarding antepartum course and delivery.    Her postoperative course was unremarkable.  On POD # 2 she felt like she was ready for discharge.  She was evaluated by Dr. Robin who agreed she was able to be discharged to home.  She had no febrile morbidity. She had normal bowel and bladder function and was hemodynamically stable.  Her wound was healing well without obvious signs of infections.    Infant  male  fetus weighing 3200 g (7 lb 0.9 oz)   Apgars -  9 @ 1 minute /  9 @ 5 minutes.    Discharge labs  Lab Results   Component Value Date    WBC 13.23 (H) 2023    HGB 11.1 (L) 2023    HCT 34.1 2023     2023       Discharge Medications     Discharge Medications      New Medications      Instructions Start Date   acetaminophen 325 MG tablet  Commonly known as: TYLENOL   650 mg, Oral, Every 6 Hours PRN      cyclobenzaprine 10 MG tablet  Commonly known as: FLEXERIL   10 mg, Oral, Every 8 Hours PRN      ibuprofen 200 MG tablet  Commonly known as: ADVIL,MOTRIN   400 mg, Oral, Every 4 Hours PRN      oxyCODONE 5 MG immediate release tablet  Commonly known as: ROXICODONE   5 mg, Oral, Every 6 Hours PRN         Continue These Medications      Instructions Start Date   busPIRone 7.5 MG tablet  Commonly known as: BUSPAR    7.5 mg, Oral, 3 Times Daily PRN      fluticasone 50 MCG/ACT nasal spray  Commonly known as: FLONASE   USE 1 SPRAY(S) IN EACH NOSTRIL TWICE DAILY FOR 10 DAYS      loratadine 10 MG tablet  Commonly known as: CLARITIN   Oral      omega-3 acid ethyl esters 1 g capsule  Commonly known as: LOVAZA   No dose, route, or frequency recorded.      prenatal vitamin 27-0.8 27-0.8 MG tablet tablet   1 tablet, Oral, Daily      valACYclovir 1000 MG tablet  Commonly known as: VALTREX   TAKE 2 TABLETS BY MOUTH TWICE DAILY FOR 1 DAY             External Prenatal Results     Pregnancy Outside Results - Transcribed From Office Records - See Scanned Records For Details     Test Value Date Time    ABO  A  05/10/23 1132    Rh  Positive  05/10/23 1132    Antibody Screen  Negative  05/10/23 1132       Negative  10/03/22 1329    Varicella IgG  540 index 10/03/22 1329    Rubella  8.15 index 10/03/22 1329    Hgb  11.1 g/dL 05/11/23 0637       12.8 g/dL 05/10/23 1132       11.3 g/dL 03/01/23 0840       12.8 g/dL 10/03/22 1329    Hct  34.1 % 05/11/23 0637       38.4 % 05/10/23 1132       38.2 % 10/03/22 1329    Glucose Fasting GTT  76 mg/dL 08/10/18 0922    Glucose Tolerance Test 1 hour  137 mg/dL 08/10/18 0922    Glucose Tolerance Test 3 hour  124 mg/dL 08/10/18 0922    Gonorrhea (discrete)  Negative  10/03/22 1329    Chlamydia (discrete)  Negative  10/03/22 1329    RPR  Non Reactive  10/03/22 1329    VDRL       Syphilis Antibody       HBsAg  Negative  10/03/22 1329    Herpes Simplex Virus PCR       Herpes Simplex VIrus Culture       HIV  Non Reactive  10/03/22 1329    Hep C RNA Quant PCR       Hep C Antibody  <0.1 s/co ratio 10/03/22 1329    AFP       Group B Strep  Negative  09/06/18 1321    GBS Susceptibility to Clindamycin       GBS Susceptibility to Erythromycin       Fetal Fibronectin       Genetic Testing, Maternal Blood             Drug Screening     Test Value Date Time    Urine Drug Screen       Amphetamine Screen       Barbiturate  Screen       Benzodiazepine Screen       Methadone Screen       Phencyclidine Screen       Opiates Screen       THC Screen       Cocaine Screen       Propoxyphene Screen       Buprenorphine Screen       Methamphetamine Screen       Oxycodone Screen       Tricyclic Antidepressants Screen             Legend    ^: Historical                        Discharge Disposition Home or Self Care   Condition on Discharge: good   Follow-up: 2 weeks with Dr. Quesada     Plan for discharge reviewed with Dr. Robin.    This note has been signed electronically.    Marjorie Ch, DNP, APRN, CNM, RNC-OB  5/12/2023

## 2023-05-12 NOTE — LACTATION NOTE
Mother's Name: Jennifer Phone #: [phone]  Infant Name: Jose : 5[birth year]10/2021  Gestation: 39w0d  Day of life: 2  Birth weight: 7-0.9 (3200g) Discharge weight: 6-9.2 (2982g)  Weight Loss: -6.8%  24 hour Summary of Feeds: 7 BF + 0.8 ml EBM Voids: 1  Stools: 7  Assistive devices (shields, shells, etc): nipple shield  Significant Maternal history: ,  first child for 16 months used nipple shield for 3 months   Maternal Concerns: None  Maternal Goal: exclusive breastfeeding for 6 months -1 year  Mother's Medications: PNV, Valtrex, Buspar  Breastpump for home: Zoomie  Ped follow up appt: Andres 2 weeks. [facility] Lactation 23 at 1400.    Patient reports breastfeeding is improving. States infant is breastfeeding well on the first breast, however, not very long/well on the second. States she is pumping the second breast and feeding what is collected. She last collected 2.5 ml. Much encouragement provided for improvement. Gave and reviewed breastfeeding after discharge packet. Discussed infant weight loss/output and outpatient lactation appointment scheduled for tomorrow. Questions denied.    Instructed mom our lactation team is here for continued support throughout their breastfeeding journey. Our team has encouraged mom to call with any questions or concerns that may arise after discharge.    Breastfeeding After Discharge     Signs of Milk: Fullness, firmness, heaviness of breasts, leaking of milk.  Signs of Good Feed: Breast fullness prior to feed, breasts soft and comfortable after feeding. Infant content after feeding: calm, sleepy, relaxed and without continued hunger cues.  Signs of Plugged Ducts, Engorgement and Mastitis: Plugged ducts (milk entrapment in milk ducts)- small tender knots that often feel like little beans under breast tissue, usually tender. Massage on these areas of concern while breastfeeding or pumping to promote emptying.   Engorgement- fluid or excess milk, breasts become  uncomfortably full, tight, firm (compare to the firmness of your cheek (mild), chin (moderate) or forehead (severe). First line of treatment should be to BREASTFEED, if breasts remain full feeling after a feeding, it may be necessary to pump, ONLY UNTIL BREASTS ARE SOFT AND COMFORTABLE. DO NOT OVER PUMP (complete emptying of breasts can trigger even more milk which will cause continued, recurrent Engorgement).  Mastitis- Infection of the breast tissue, most often caused by plugged ducts that are not adequately treated by emptying, recurrent trauma to nipples or breasts (cracked or bleeding nipples). Signs: redness, swelling, tender knots or fever to breasts as well as generalized fever >101 degrees F that is often sudden onset. Treatment of mastitis, BREASTFEED! Pump after breastfeeding to achieve COMPLETE emptying of affected breast, utilizing massage to areas of concern, may use cold compress to affected area only after breast emptying. May take anti-inflammatories i.e. Ibuprofen, Motrin. CALL your OB for assessment and continued treatment with Antibiotics to adequately treat mastitis.  Infant Care: Over the first 2 weeks it is important to keep record of infant's feeding routine (feeding times and durations), wet and dirty diaper frequency, stool color and any spit ups that may occur.  Keep in mind, ALL babies will lose some weight initially (usually no more than 10% by day 3). Until infant returns to/ surpasses birth weight (which can take up to 2 weeks), it is important to offer feedings AT LEAST EVERY 3 HOURS. Remember, if you choose to supplement infant with formula or previously pumped milk, you should always pump in replacement of that feeding in order to promote and maintain a healthy milk supply!  Maternal Care: REST, sleep when the infant sleeps, stay hydrated (water is optimal) drink to thirst, increase caloric intake - breastfeeding mother's need an ADDITIONAL 500 calories per day , eat 3 meals/day as  well as snacks in between, limit CAFFIENE intake to 2 cups/day. Ask your significant other, family members or friends for help when needed, taking advantage of meal trains, allowing others to help with laundry, house chores, etc can help you focus on what is most important early on after delivery… you and your infant, and breastfeeding!   Medications to CONTINUE: Prenatal Vitamins are important to continue taking while breastfeeding. Fish oil, magnesium/calcium supplements often are helpful to support Mothers and their milk supply as well. Tylenol, Ibuprofen, regular Zyrtec, Claritin are SAFE if you suffer from seasonal allergies. Flonase is safe and often an effective medication to take if suffering from sinus drainage/pressure.  Medications to AVOID: Benadryl, Sudafed, any medications including “DM” or have a drying effect to sinus drainage will also dry a mother's milk up. Birth control- your OB will want to address birth control options with you usually around 4-6 weeks postpartum, be sure to notify your MD if you continue to breastfeed as some birth controls may significantly decrease your milk supply. Herbals- some herbs may also decrease your milk supply: PEPPERMINT, MENTHOL in any form (candies, essential oils, teas, etc), so check labels and avoid using in excess.  Pumping: Although we encourage you to focus on breastfeeding over the first 2-4 weeks, you will need to plan to begin pumping. We do recommend implementing pumping by the time infant is 4 weeks old. Pump 2-3 times per day immediately AFTER breastfeeding, it is normal to collect very small amounts initially, but the more consistently you pump, the more you will begin to collect. Store collected milk in refrigerator or freezer. You should also begin offering infant a bottle around 4 weeks. Remember to use slow flow nipples and PACE the bottle-feed. A bottle feed should take about as long as a breastfeeding session.

## 2023-05-13 ENCOUNTER — HOSPITAL ENCOUNTER (OUTPATIENT)
Dept: LACTATION | Facility: HOSPITAL | Age: 33
Discharge: HOME | End: 2023-05-13

## 2023-05-13 NOTE — LACTATION NOTE
Mother's Name: Jennifer  Contact Number: [phone]  G/P: 3/  Breastfeeding Hx:  first child for 16 months. Nipple Shield used for 3 months.   Significant Medical History: None  Maternal Breast Assessment: breasts are sore, full, 3 firm areas to the right breast in the outer quadrant palpated, nipples w/o trauma    Infant's Name: Jose  Date of Birth: [birth year]  Gestational age at Birth: 39w0d  Age: 3 days  Physician: Andres                     Reason for Visit: Ordered  follow up          Infant's Birth weight: 7-0.9 (3200g)  Previous Weight: 6-9.2 (2982g)  Wt Loss: -6.8% on 23    Today's Weight: 6-6.8 (2915g)  Wt Loss: -8.9%    Feeding History Since Discharge/Last Lactation Appt.: Infant is breastfeeding every 3 hours for 15 minutes on each breast. Patient is pumping to keep breasts soft. She last collected 2.5 ounces.     Past 24 Hours Voids/Stools:  3/6    Color of Stool: Green    Pre Weight: 2935g           Left Breast: 18 minutes--2955g--gained 20 grams           Right Breast: 3 minutes--2960g--gained 5 grams                    Total Minutes:   21          Total Weight Gain:     25     gms    Average Feeding Amount for Age: 1/2-1 ounce (15-30 ml) every 3 hours    Interventions: Minimally assisted with positioning. Patient latched infant without using nipple shield. Infant latched well with consistent deep jaw drops and audible swallows noted.     Education: feeding amounts and frequency, infant's weight/output, pumping, signs of nutritive sucking    Notified MD/ Orders Received: No    Feeding Plan:     · Continue breastfeeding as you are, on demand or waking infant every 3 hours  · Continue pumping as needed to keep breasts soft    Plan of Care:    X Interventions accomplished satisfactorily, requires no further action.     Interventions require further assessment with [facility] Lactation     Interventions require further assessment with MD    Future Appointments:    Lactation:  Thursday, 5/18/23 for weight check    Physician: Andres 5/24/23 at 1045    Signature: Tammie Lovell RN, IBCLC

## 2023-05-15 NOTE — PAYOR COMM NOTE
"REF  [id number]    [facility]  THOMAS  [phone]  OR  FAX   [phone]       Tanner Mcclelland (33 y.o. Female)     Date of Birth   1990    Social Security Number       Address   [address] [address]    Home Phone   [phone]    MRN   [id number]       Voodoo   Other    Marital Status                               Admission Date   5/10/23    Admission Type   Elective    Admitting Provider   Alice Quesada MD    Attending Provider       Department, Room/Bed   [facility] MOTHER BABY 2A, M243/1       Discharge Date   2023    Discharge Disposition   Home or Self Care    Discharge Destination   Home                            Attending Provider: (none)   Allergies: Septra [Sulfamethoxazole-trimethoprim], Sulfa Antibiotics    Isolation: None   Infection: None   Code Status: Prior    Ht: 157.5 cm (62\")   Wt: 86.2 kg (190 lb)    Admission Cmt: None   Principal Problem: History of  delivery, currently pregnant [O34.219]                 Active Insurance as of 5/10/2023     Primary Coverage     Payor Plan Insurance Group Employer/Plan Group    [employer] [place] [employer]      Payor Plan Address Payor Plan Phone Number Payor Plan Fax Number Effective Dates    [address] [address] [phone]  1/3/2017 - None Entered    [facility] [id number]       Subscriber Name Subscriber Birth Date Member ID       TANNER MCCLELLAND 1990 [id number]                 Emergency Contacts      (Rel.) Home Phone Work Phone Mobile Phone    James Mcclelland (Spouse) -- -- [phone]               Discharge Summary      Marjorie Ch APRN at 23 0953     Attestation signed by Errol Robin MD at 23 1040    I have reviewed this documentation and agree.                                                          YVON Delgadillo  [facility]  [address]  [place] [address]  Office [phone]  Fax [phone]    Discharge " Summary     Holly Nicole  : 1990  MRN: [id number]  [facility]: [id number]    Date of Admission: 5/10/2023   Date of Discharge:  2023   Delivering Physician: Alice Quesada MD       Admission Diagnosis: 1. History of  delivery, currently pregnant [O34.219]  2. Previous  delivery affecting pregnancy [O34.219]   Discharge Diagnosis: 1. Pregnancy at 39w0d - delivered       Procedures: 1. Repeat  (LTCS)     Hospital Course  See the completed operative report for details regarding antepartum course and delivery.    Her postoperative course was unremarkable.  On POD # 2 she felt like she was ready for discharge.  She was evaluated by Dr. Robin who agreed she was able to be discharged to home.  She had no febrile morbidity. She had normal bowel and bladder function and was hemodynamically stable.  Her wound was healing well without obvious signs of infections.    Infant  male  fetus weighing 3200 g (7 lb 0.9 oz)   Apgars -  9 @ 1 minute /  9 @ 5 minutes.    Discharge labs  Lab Results   Component Value Date    WBC 13.23 (H) 2023    HGB 11.1 (L) 2023    HCT 34.1 2023     2023       Discharge Medications     Discharge Medications      New Medications      Instructions Start Date   acetaminophen 325 MG tablet  Commonly known as: TYLENOL   650 mg, Oral, Every 6 Hours PRN      cyclobenzaprine 10 MG tablet  Commonly known as: FLEXERIL   10 mg, Oral, Every 8 Hours PRN      ibuprofen 200 MG tablet  Commonly known as: ADVIL,MOTRIN   400 mg, Oral, Every 4 Hours PRN      oxyCODONE 5 MG immediate release tablet  Commonly known as: ROXICODONE   5 mg, Oral, Every 6 Hours PRN         Continue These Medications      Instructions Start Date   busPIRone 7.5 MG tablet  Commonly known as: BUSPAR   7.5 mg, Oral, 3 Times Daily PRN      fluticasone 50 MCG/ACT nasal spray  Commonly known as: FLONASE   USE 1 SPRAY(S) IN EACH NOSTRIL TWICE DAILY FOR 10 DAYS      loratadine  10 MG tablet  Commonly known as: CLARITIN   Oral      omega-3 acid ethyl esters 1 g capsule  Commonly known as: LOVAZA   No dose, route, or frequency recorded.      prenatal vitamin 27-0.8 27-0.8 MG tablet tablet   1 tablet, Oral, Daily      valACYclovir 1000 MG tablet  Commonly known as: VALTREX   TAKE 2 TABLETS BY MOUTH TWICE DAILY FOR 1 DAY             External Prenatal Results     Pregnancy Outside Results - Transcribed From Office Records - See Scanned Records For Details     Test Value Date Time    ABO  A  05/10/23 1132    Rh  Positive  05/10/23 1132    Antibody Screen  Negative  05/10/23 1132       Negative  10/03/22 1329    Varicella IgG  540 index 10/03/22 1329    Rubella  8.15 index 10/03/22 1329    Hgb  11.1 g/dL 05/11/23 0637       12.8 g/dL 05/10/23 1132       11.3 g/dL 03/01/23 0840       12.8 g/dL 10/03/22 1329    Hct  34.1 % 05/11/23 0637       38.4 % 05/10/23 1132       38.2 % 10/03/22 1329    Glucose Fasting GTT  76 mg/dL 08/10/18 0922    Glucose Tolerance Test 1 hour  137 mg/dL 08/10/18 0922    Glucose Tolerance Test 3 hour  124 mg/dL 08/10/18 0922    Gonorrhea (discrete)  Negative  10/03/22 1329    Chlamydia (discrete)  Negative  10/03/22 1329    RPR  Non Reactive  10/03/22 1329    VDRL       Syphilis Antibody       HBsAg  Negative  10/03/22 1329    Herpes Simplex Virus PCR       Herpes Simplex VIrus Culture       HIV  Non Reactive  10/03/22 1329    Hep C RNA Quant PCR       Hep C Antibody  <0.1 s/co ratio 10/03/22 1329    AFP       Group B Strep  Negative  09/06/18 1321    GBS Susceptibility to Clindamycin       GBS Susceptibility to Erythromycin       Fetal Fibronectin       Genetic Testing, Maternal Blood             Drug Screening     Test Value Date Time    Urine Drug Screen       Amphetamine Screen       Barbiturate Screen       Benzodiazepine Screen       Methadone Screen       Phencyclidine Screen       Opiates Screen       THC Screen       Cocaine Screen       Propoxyphene Screen        Buprenorphine Screen       Methamphetamine Screen       Oxycodone Screen       Tricyclic Antidepressants Screen             Legend    ^: Historical                        Discharge Disposition Home or Self Care   Condition on Discharge: good   Follow-up: 2 weeks with Dr. Quesada     Plan for discharge reviewed with Dr. Robin.    This note has been signed electronically.    Marjorie Ch, DNP, APRN, CNM, RNC-OB  5/12/2023        Electronically signed by Errol Robin MD at 05/12/23 1040       Discharge Order (From admission, onward)     Start     Ordered    05/12/23 0950  Discharge patient  Once        Expected Discharge Date: 05/12/23    Discharge Disposition: Home or Self Care    Physician of Record for Attribution - Please select from Treatment Team: SHOBHA QUESADA [[id number]]    Review needed by CMO to determine Physician of Record: No       Question Answer Comment   Physician of Record for Attribution - Please select from Treatment Team SHOBHA QUESADA    Review needed by CMO to determine Physician of Record No        05/12/23 0941

## 2023-05-16 ENCOUNTER — TELEPHONE (OUTPATIENT)
Dept: OBSTETRICS AND GYNECOLOGY | Facility: CLINIC | Age: 33
End: 2023-05-16
Payer: COMMERCIAL

## 2023-05-16 NOTE — TELEPHONE ENCOUNTER
Pt calls with c/o post op  pain and having swelling in BLE. Patient is out of pain medication but has been alternating Ibuprofen and Tylenol. Pt states she lives about 45 minutes to an hour away and is planning to go to her PCP today to have them evaluate the swelling in BLE. Pt denies any redness or tenderness in either extremity. Pt is advised that if she could not come here then being evaluated by her PCP would be appropriate. Pt instructed that should pain become severe she would need to seek treatment in the ER. Pt denies any fever, incision is not open and not draining.

## 2023-05-18 ENCOUNTER — TELEPHONE (OUTPATIENT)
Dept: LABOR AND DELIVERY | Facility: HOSPITAL | Age: 33
End: 2023-05-18
Payer: COMMERCIAL

## 2023-05-18 ENCOUNTER — HOSPITAL ENCOUNTER (OUTPATIENT)
Dept: LACTATION | Facility: HOSPITAL | Age: 33
Discharge: HOME | End: 2023-05-18
Payer: COMMERCIAL

## 2023-05-18 NOTE — TELEPHONE ENCOUNTER
Pt called to report current weight per wt at current health department.    Today's Weight: 6-11.0    Pt states she is pumping after breastfeeding and collecting 5 oz per session. She stores this milk. Infant gained 4 oz in 5 days. Meets expectations.

## 2023-05-18 NOTE — TELEPHONE ENCOUNTER
Pt says is obtaining current weight from local health dept as lives considerable distance from [facility]. After weight is obtained today, she will call with results. She was to come for wt check only today. Thanks pt for this update. No other concerns voiced regarding bfing or providing milk to infant.

## 2023-05-26 ENCOUNTER — POSTPARTUM VISIT (OUTPATIENT)
Dept: OBSTETRICS AND GYNECOLOGY | Facility: CLINIC | Age: 33
End: 2023-05-26
Payer: COMMERCIAL

## 2023-05-26 VITALS
DIASTOLIC BLOOD PRESSURE: 68 MMHG | BODY MASS INDEX: 30.55 KG/M2 | HEIGHT: 62 IN | SYSTOLIC BLOOD PRESSURE: 112 MMHG | WEIGHT: 166 LBS

## 2023-05-26 DIAGNOSIS — Z30.011 ENCOUNTER FOR INITIAL PRESCRIPTION OF CONTRACEPTIVE PILLS: Primary | ICD-10-CM

## 2023-05-26 RX ORDER — NORETHINDRONE ACETATE AND ETHINYL ESTRADIOL AND FERROUS FUMARATE 1MG-20(24)
1 KIT ORAL DAILY
Qty: 28 TABLET | Refills: 12 | Status: SHIPPED | OUTPATIENT
Start: 2023-05-26 | End: 2024-05-25

## 2023-05-26 NOTE — PROGRESS NOTES
"Subjective   Chief Complaint   Patient presents with    Postpartum Care     Pt here 2 week PP r/p  done 5/10. Baby boy, Jose. Pt is breast feeding. Pt denies issues with PPD. Pt states incision is healing well.      Jennifer Nicole is a 33 y.o. year old  presenting to be seen for her postpartum visit.  She had a Repeat  (LTCS) 2 weeks ago, with Ailce Quesada   Prenatal course was been benign.  Lochia is light.  Patient reports most pain is with getting out of bed, coughing or sneezing.    Since delivery she has not been sexually active.  She does not have concerns about post-partum blues/depression.   She is  breastfeeding and reports that it is going good .    The following portions of the patient's history were reviewed and updated as appropriate:current medications and allergies    Social History    Tobacco Use      Smoking status: Every Day        Packs/day: 1.00        Types: Cigarettes        Last attempt to quit: 2018        Years since quittin.3      Smokeless tobacco: Never    Review of Systems      Objective   /68   Ht 157.5 cm (62\")   Wt 75.3 kg (166 lb)   Breastfeeding Yes   BMI 30.36 kg/m²     General:  well developed; well nourished  no acute distress   Abdomen: soft, non-tender; no masses  incision is healing, clean, dry, intact, and without drainage.  Skin edge rolled out a bit at one end, but incision intact   Pelvis: Not performed.          Assessment   Normal 2 week postpartum exam  Doing well, has lost 20+ pounds already     Plan   BC options reviewed and compared today: OCP (estrogen/progesterone) and OCP (progestin only).  Patient knows that she wants to be on pills.  Discussed pro's and con's, including higher failure rate with micronor.  Patient wants to be on LoEstrin again  Continue current precautions  RTO in 4 weeks    No orders of the defined types were placed in this encounter.         This note was electronically signed.    Alice Quesada, " MD  May 26, 2023

## 2023-06-05 ENCOUNTER — TELEPHONE (OUTPATIENT)
Dept: OBSTETRICS AND GYNECOLOGY | Facility: CLINIC | Age: 33
End: 2023-06-05
Payer: COMMERCIAL

## 2023-06-05 NOTE — TELEPHONE ENCOUNTER
Pt called and informed me that she has had some yellow oozing from her incision that started last night, she reports mild redness and a small opening in incision, pt denies any fevers but does report mild pain at worst 3/10.  Do you want her to come in for a sooner appt than 6/30 for evaluation or to keep clean and dry and call if symptoms worsen?

## 2023-06-06 ENCOUNTER — POSTPARTUM VISIT (OUTPATIENT)
Dept: OBSTETRICS AND GYNECOLOGY | Facility: CLINIC | Age: 33
End: 2023-06-06
Payer: COMMERCIAL

## 2023-06-06 ENCOUNTER — TELEPHONE (OUTPATIENT)
Dept: OBSTETRICS AND GYNECOLOGY | Facility: CLINIC | Age: 33
End: 2023-06-06

## 2023-06-06 VITALS
TEMPERATURE: 98.4 F | SYSTOLIC BLOOD PRESSURE: 108 MMHG | BODY MASS INDEX: 30 KG/M2 | WEIGHT: 163 LBS | DIASTOLIC BLOOD PRESSURE: 82 MMHG | HEIGHT: 62 IN

## 2023-06-06 DIAGNOSIS — T81.89XA PROBLEM INVOLVING SURGICAL INCISION: ICD-10-CM

## 2023-06-06 DIAGNOSIS — G89.18 PAIN FOLLOWING CESAREAN DELIVERY: Primary | ICD-10-CM

## 2023-06-06 NOTE — TELEPHONE ENCOUNTER
Please call patient in AM and see how she is doing.  If redness is continuing to worsen despite antibiotic therapy, then she needs to come in and see me tomorrow.  If it is the same or better, then she can wait.  Thanks

## 2023-06-06 NOTE — PROGRESS NOTES
Chief Complaint  Postpartum Care (Pt is here with c/o a possible csection incision infection.  Right side of incision became tender about three days ago, yesterday the area was hard and she was able to push a good amount of drainage out.  When patient woke up this morning she noticed the area red and warm to touch.  Denies any fever)    Subjective          Jennifer Nicole presents to [facility]  History of Present Illness    The patient presents to the office for follow-up and is postpartum.     She has had pus-looking yellow drainage.   She is using antibacterial soap.   She denies using Hibiclens or Neosporin.   She denies using any ointments or lotions on the area.   The areas are painful and rates it as a 3/10.   The area has hardness around it.   The pain is located on the right side of her abdomen.   The pain radiates only in this area.  She claims it became worse overnight.     She denies taking her birth control at this time.     She is noticing a breast milk reduction.     The patient received hydrogel patches.   She is unable to shave the area.    The patient is a smoker.       Review of Systems   Constitutional:  Negative for activity change, appetite change, fatigue and fever.        Pt reports she continues to breastfeed.     Respiratory:  Negative for apnea and shortness of breath.    Cardiovascular:  Negative for chest pain and palpitations.   Gastrointestinal:  Negative for abdominal distention, abdominal pain, constipation, diarrhea, nausea and vomiting.   Endocrine: Negative for cold intolerance and heat intolerance.   Genitourinary:  Negative for difficulty urinating, frequency, menstrual problem, pelvic pain, vaginal discharge and vaginal pain.   Skin:         Area became tender approx 3 days ago     Neurological:  Negative for headaches.   Psychiatric/Behavioral:  Negative for agitation and sleep disturbance.        Objective   Vital Signs:   /82   Temp 98.4 °F  "(36.9 °C)   Ht 157.5 cm (62\")   Wt 73.9 kg (163 lb)   BMI 29.81 kg/m²     Physical Exam  Vitals reviewed.   Constitutional:       Appearance: She is well-developed.   Eyes:      General:         Right eye: No discharge.         Left eye: No discharge.   Cardiovascular:      Rate and Rhythm: Normal rate and regular rhythm.   Pulmonary:      Effort: Pulmonary effort is normal.      Breath sounds: Normal breath sounds.   Skin:     General: Skin is warm.             Comments: Approx 2 cm area tender and red at edges, clear to very pale yellow watery drainage present.      Neurological:      Mental Status: She is alert and oriented to person, place, and time.   Psychiatric:         Behavior: Behavior normal.         Thought Content: Thought content normal.         Judgment: Judgment normal.       Result Review :                       Assessment and Plan      The patient had a culture collected.   If changes need to be made in her medication after culture results are reviewed, we will call to inform her.     The patient was advised to cleanse area with Hibiclens daily, rinsing well.   The patient was advised to keep area clean and dry.   The patient was prescribed dicloxacillin.   She was advised to use plain gauze to the area.   She was advised to change out the gauze at least every time she goes to the restroom.   She was educated on side effects of smoking with healing.   The patient was advised to call the office if she has fever or area worsens.   The patient will return on 2023 for recheck.     The patient was advised to restart her birth control for contraception.     Diagnoses and all orders for this visit:    1. Pain following  delivery (Primary)  -     Anaerobic & Aerobic Culture ([facility] Only) - Swab, Abdomen, Lower    2. Problem involving surgical incision  -     Anaerobic & Aerobic Culture ([facility] Only) - Swab, Abdomen, Lower    Other orders  -     dicloxacillin (DYNAPEN) 500 MG capsule; " Take 1 capsule by mouth 4 (Four) Times a Day. (Patient not taking: Reported on 6/16/2023)  Dispense: 28 capsule; Refill: 0          BMI is >= 25 and <30. (Overweight) The following options were offered after discussion;: weight loss educational material (shared in after visit summary)       Follow Up   Return for Friday morning, site check.    Patient was given instructions and counseling regarding her condition or for health maintenance advice. Please see specific information pulled into the AVS if appropriate.     Transcribed from ambient dictation for YVON Sepulveda by Brittaney So.  06/06/23   20:48 CDT    Patient or patient representative verbalized consent to the visit recording.  I have personally performed the services described in this document as transcribed by the above individual, and it is both accurate and complete.  YVON Sepulveda  6/18/2023  16:43 CDT

## 2023-06-06 NOTE — TELEPHONE ENCOUNTER
Spoke with pt-c/o drainage, redness, warmth, and pain on right side of  incision. We are going to speak with the provider to see where to bring her in today or if she needs to try and go to an urgent care closer to her home. ANDREY

## 2023-06-06 NOTE — TELEPHONE ENCOUNTER
Caller: Jennifer Nicole    Relationship to patient: Self    Best call back number: [phone]    Chief complaint: HER INCISION FROM HER  IS INFECTED PATIENT STATES AND IT HURTS//PATIENT STATES THAT THE RIGHT SIDE HAS YELLOW INFECTION COMING OUT, RED IN THAT AREA, ITS HARD IN THAT AREA, AND TODAY IS WARM TO THE TOUCH//PATIENT IS WANTING TO BE SEEN AS SOON AS POSSIBLE//    Type of visit: POSTPARTUM    Requested date: TODAY     If rescheduling, when is the original appointment: N/A     Additional notes:PATIENT STATED THAT SHE WILL SEE ANY PROVIDER AND THAT SHE WILL NEED AN HOUR TO GET TO THE OFFICE FROM NOTICE BECAUSE SHE LIVES 45 MINUTES AWAY//A TE WAS SENT YESTERDAY BUT HASN'T HEARD ANYTHING- PATIENT IS VERY CONCERNED ABOUT THE AREA SENDING HIGH PRIORITY

## 2023-06-07 NOTE — TELEPHONE ENCOUNTER
Called to f/u with pt, stated she is taking abx and it is still red but is about the same.  I informed pt to call if her symptoms worsen or go to [facility] ED if she worsens overnight, pt voiced understanding.

## 2023-06-13 LAB
BACTERIA SPEC AEROBE CULT: ABNORMAL
BACTERIA SPEC ANAEROBE CULT: ABNORMAL
BACTERIA SPEC CULT: ABNORMAL
OTHER ANTIBIOTIC SUSC ISLT: ABNORMAL

## 2023-06-18 NOTE — PATIENT INSTRUCTIONS

## 2023-08-10 ENCOUNTER — TELEPHONE (OUTPATIENT)
Dept: OBSTETRICS AND GYNECOLOGY | Facility: CLINIC | Age: 33
End: 2023-08-10
Payer: COMMERCIAL

## 2023-08-10 ENCOUNTER — OFFICE VISIT (OUTPATIENT)
Dept: OBSTETRICS AND GYNECOLOGY | Facility: CLINIC | Age: 33
End: 2023-08-10
Payer: COMMERCIAL

## 2023-08-10 VITALS
DIASTOLIC BLOOD PRESSURE: 78 MMHG | WEIGHT: 158 LBS | BODY MASS INDEX: 29.08 KG/M2 | SYSTOLIC BLOOD PRESSURE: 120 MMHG | HEIGHT: 62 IN

## 2023-08-10 DIAGNOSIS — Z30.41 ENCOUNTER FOR SURVEILLANCE OF CONTRACEPTIVE PILLS: Primary | ICD-10-CM

## 2023-08-10 DIAGNOSIS — F17.200 SMOKER: ICD-10-CM

## 2023-08-10 RX ORDER — LORAZEPAM 0.5 MG/1
TABLET ORAL
COMMUNITY
Start: 2023-08-03

## 2023-08-10 RX ORDER — ARIPIPRAZOLE 2 MG/1
2 TABLET ORAL
COMMUNITY
Start: 2023-07-06

## 2023-08-10 RX ORDER — NORGESTIMATE AND ETHINYL ESTRADIOL 7DAYSX3 LO
1 KIT ORAL DAILY
Qty: 28 TABLET | Refills: 12 | Status: SHIPPED | OUTPATIENT
Start: 2023-08-10 | End: 2024-08-09

## 2023-08-10 NOTE — PROGRESS NOTES
"Subjective   Chief Complaint   Patient presents with    Contraception     Pt here today for follow up on birth control. Pt voices she wants to change to a different pill. Pt voices no other concerns.      Jennifer Nicole is a 33 y.o. year old .  Patient's last menstrual period was 2023 (exact date).  She presents to be seen because she would like to switch OCP's.  Patient feels like her mood is \"all over the place\" on her current low-dose pill and wants to go back on the TriSprintec that she was on before.     The following portions of the patient's history were reviewed and updated as appropriate:current medications and allergies    Social History    Tobacco Use      Smoking status: Every Day        Packs/day: 1.00        Types: Cigarettes        Last attempt to quit: 2018        Years since quittin.6      Smokeless tobacco: Never    Review of Systems   Constitutional:  Negative for activity change and unexpected weight change.   Respiratory:  Negative for shortness of breath.    Cardiovascular:  Negative for chest pain.   Genitourinary:  Negative for menstrual problem.   Psychiatric/Behavioral:  Positive for agitation (\"irritable\").        Objective   /78   Ht 157.5 cm (62\")   Wt 71.7 kg (158 lb)   LMP 2023 (Exact Date)   Breastfeeding No   BMI 28.90 kg/mý     Physical Exam  Vitals and nursing note reviewed.   Constitutional:       General: She is not in acute distress.     Appearance: She is well-developed.   HENT:      Head: Normocephalic and atraumatic.   Neck:      Thyroid: No thyromegaly.   Pulmonary:      Effort: Pulmonary effort is normal.   Musculoskeletal:         General: Normal range of motion.      Cervical back: Normal range of motion.   Skin:     General: Skin is warm and dry.   Neurological:      Mental Status: She is alert and oriented to person, place, and time.   Psychiatric:         Behavior: Behavior normal.         Judgment: Judgment normal.     Lab Review "   No data reviewed    Imaging   No data reviewed     Assessment & Plan    Diagnoses and all orders for this visit:    1. Encounter for surveillance of contraceptive pills (Primary): Patient switched back to previous pill.  She is aware that Ortho Tri-Cyclen Lo may be substituted for her previous Sprintec since they are the same dose.  Patient will be in contact with office if her mood does not return to baseline over the next 1-2 months.    2. Smoker    Other orders  -     norgestimate-ethinyl estradiol (Ortho Tri-Cyclen Lo) 0.18/0.215/0.25 MG-25 MCG per tablet; Take 1 tablet by mouth Daily.  Dispense: 28 tablet; Refill: 12         This note was electronically signed.    Alice Quesada MD  August 10, 2023  11:17 CDT    Total time spent today with Jennifer  was 20-29 minutes (level 3).  Greater than 50% of the time was spent coordinating care, answering her questions and counseling regarding pathophysiology of her presenting problem along with plans for any diagnositc work-up and treatment.

## 2023-08-10 NOTE — TELEPHONE ENCOUNTER
Pt states she would like to go back on Trisprintec and is requesting a script for that be sent to her pharmacy instead of the OrthoTriCyclen Lo. Was in office today 8/10/23. Please advise

## 2023-08-12 NOTE — TELEPHONE ENCOUNTER
Please advise Jennifer that they are the same medication.  They are just different generic names for the same strength of medication.  Thanks

## 2023-08-15 RX ORDER — NORGESTIMATE AND ETHINYL ESTRADIOL 7DAYSX3 LO
KIT ORAL
Qty: 28 TABLET | Refills: 0 | OUTPATIENT
Start: 2023-08-15

## 2023-08-18 RX ORDER — NORGESTIMATE AND ETHINYL ESTRADIOL 7DAYSX3 LO
KIT ORAL
Qty: 28 TABLET | Refills: 0 | OUTPATIENT
Start: 2023-08-18

## 2023-08-21 ENCOUNTER — TELEPHONE (OUTPATIENT)
Dept: OBSTETRICS AND GYNECOLOGY | Facility: CLINIC | Age: 33
End: 2023-08-21
Payer: COMMERCIAL

## 2023-08-21 NOTE — TELEPHONE ENCOUNTER
Pt left v/m requesting refill of OCP. 12 refills were sent on 8/10/23. I called [facility] pharmacy and they stated the script had been deactivated. Pharmacist states, script will be reactivated so that pt can refill OCP. Spoke with pt and let her know to check with pharmacy later today to  OCP. Pt voiced understanding

## 2024-03-20 ENCOUNTER — OFFICE VISIT (OUTPATIENT)
Dept: OBSTETRICS AND GYNECOLOGY | Age: 34
End: 2024-03-20
Payer: COMMERCIAL

## 2024-03-20 VITALS
SYSTOLIC BLOOD PRESSURE: 114 MMHG | BODY MASS INDEX: 28.71 KG/M2 | DIASTOLIC BLOOD PRESSURE: 62 MMHG | HEIGHT: 62 IN | WEIGHT: 156 LBS

## 2024-03-20 DIAGNOSIS — Z30.41 ENCOUNTER FOR SURVEILLANCE OF CONTRACEPTIVE PILLS: ICD-10-CM

## 2024-03-20 DIAGNOSIS — Z11.3 SCREENING EXAMINATION FOR STD (SEXUALLY TRANSMITTED DISEASE): ICD-10-CM

## 2024-03-20 DIAGNOSIS — N89.8 VAGINAL ODOR: ICD-10-CM

## 2024-03-20 DIAGNOSIS — Z01.419 WELL WOMAN EXAM WITH ROUTINE GYNECOLOGICAL EXAM: Primary | ICD-10-CM

## 2024-03-20 DIAGNOSIS — Z12.4 ENCOUNTER FOR SCREENING FOR CERVICAL CANCER: ICD-10-CM

## 2024-03-20 PROCEDURE — 87625 HPV TYPES 16 & 18 ONLY: CPT

## 2024-03-20 PROCEDURE — 87661 TRICHOMONAS VAGINALIS AMPLIF: CPT

## 2024-03-20 PROCEDURE — 87591 N.GONORRHOEAE DNA AMP PROB: CPT

## 2024-03-20 PROCEDURE — 87624 HPV HI-RISK TYP POOLED RSLT: CPT

## 2024-03-20 PROCEDURE — 87491 CHLMYD TRACH DNA AMP PROBE: CPT

## 2024-03-20 RX ORDER — NORGESTIMATE AND ETHINYL ESTRADIOL 7DAYSX3 LO
1 KIT ORAL DAILY
Qty: 28 TABLET | Refills: 12 | Status: SHIPPED | OUTPATIENT
Start: 2024-03-20 | End: 2025-03-20

## 2024-03-20 RX ORDER — CARIPRAZINE 1.5 MG/1
1 CAPSULE, GELATIN COATED ORAL DAILY
COMMUNITY
Start: 2024-03-06

## 2024-03-20 NOTE — PROGRESS NOTES
"Subjective     Jennifer Nicole is a 33 y.o. female    History of Present Illness  The patient presents to [facility] today for her annual well woman examination. The patient reports a new onset of vaginal odor recently. She has not tried any otc treatments for this. She is taking oral birth control as her choice for contraception and doing well with this. She does wish to continue use. She will need refills sent to her pharmacy. She denies any further GYN problems or concerns today.  Gynecologic Exam  The patient's primary symptoms include a genital odor. The patient's pertinent negatives include no genital itching, genital lesions, genital rash, missed menses, pelvic pain, vaginal bleeding or vaginal discharge. This is a new problem. The current episode started in the past 7 days. The problem occurs daily. The problem has been unchanged. The patient is experiencing no pain. The problem affects the vaginal only side. Pertinent negatives include no abdominal pain, anorexia, back pain, chills, constipation, diarrhea, discolored urine, dysuria, fever, flank pain, frequency, headaches, hematuria, joint pain, joint swelling, nausea, painful intercourse, rash, sore throat, urgency or vomiting. Nothing aggravates the symptoms. She has tried nothing for the symptoms. The treatment provided no relief. She is sexually active. No, her partner does not have an STD. She uses oral contraceptives for contraception. Her menstrual history has been regular. Her past medical history is significant for a  section. The maximum temperature recorded prior to her arrival was no fever.         /62   Ht 157.5 cm (62\")   Wt 70.8 kg (156 lb)   LMP 2024 (Exact Date)   Breastfeeding No   BMI 28.53 kg/m²     Outpatient Encounter Medications as of 3/20/2024   Medication Sig Dispense Refill    busPIRone (BUSPAR) 7.5 MG tablet Take 1 tablet by mouth three times daily as needed 90 tablet 0    fluticasone (FLONASE) 50 MCG/ACT " nasal spray USE 1 SPRAY(S) IN EACH NOSTRIL TWICE DAILY FOR 10 DAYS      loratadine (CLARITIN) 10 MG tablet Take  by mouth.      LORazepam (ATIVAN) 0.5 MG tablet       norgestimate-ethinyl estradiol (Ortho Tri-Cyclen Lo) 0.18/0.215/0.25 MG-25 MCG per tablet Take 1 tablet by mouth Daily. 28 tablet 12    omega-3 acid ethyl esters (LOVAZA) 1 g capsule       Prenatal Vit-Fe Fumarate-FA (PRENATAL VITAMIN 27-0.8) 27-0.8 MG tablet tablet Take 1 tablet by mouth Daily. 60 tablet 4    valACYclovir (VALTREX) 1000 MG tablet TAKE 2 TABLETS BY MOUTH TWICE DAILY FOR 1 DAY      Vraylar 1.5 MG capsule capsule Take 1 capsule by mouth Daily.      [DISCONTINUED] norgestimate-ethinyl estradiol (Ortho Tri-Cyclen Lo) 0.18/0.215/0.25 MG-25 MCG per tablet Take 1 tablet by mouth Daily. 28 tablet 12    [DISCONTINUED] ARIPiprazole (ABILIFY) 2 MG tablet Take 1 tablet by mouth every night at bedtime.      [DISCONTINUED] cyclobenzaprine (FLEXERIL) 10 MG tablet Take 1 tablet by mouth Every 8 (Eight) Hours As Needed for Muscle Spasms. 30 tablet 0     No facility-administered encounter medications on file as of 3/20/2024.       Past Medical History  Past Medical History:   Diagnosis Date    Abnormal Pap smear of cervix     Anxiety     Herpes labialis 2022    Migraine     Miscarriage 2010    Ovarian cyst        Surgical History  Past Surgical History:   Procedure Laterality Date     SECTION N/A 10/02/2018    Procedure:  SECTION PRIMARY;  Surgeon: Alice Quesada MD;  Location: [facility] LABOR DELIVERY;  Service: Obstetrics/Gynecology     SECTION N/A 5/10/2023    Procedure:  SECTION REPEAT;  Surgeon: Alice Quesada MD;  Location: [facility] LABOR DELIVERY;  Service: Obstetrics/Gynecology;  Laterality: N/A;    CYST REMOVAL      2018       Family History  Family History   Problem Relation Age of Onset    Thyroid cancer Father     Colon polyps Mother     Hypertension Paternal Grandfather     Hypertension  Paternal Grandmother     Hypertension Maternal Grandmother     Breast cancer Maternal Aunt     Brain cancer Paternal Uncle        The following portions of the patient's history were reviewed and updated as appropriate: allergies, current medications, past family history, past medical history, past social history, past surgical history, and problem list.    Review of Systems   Constitutional: Negative.  Negative for chills and fever.   HENT: Negative.  Negative for sore throat.    Eyes: Negative.    Respiratory: Negative.     Cardiovascular: Negative.    Gastrointestinal: Negative.  Negative for abdominal pain, anorexia, constipation, diarrhea, nausea and vomiting.   Endocrine: Negative.    Genitourinary: Negative.  Negative for dysuria, flank pain, frequency, hematuria, missed menses, pelvic pain, urgency and vaginal discharge.   Musculoskeletal: Negative.  Negative for back pain and joint pain.   Skin: Negative.  Negative for rash.   Allergic/Immunologic: Negative.    Neurological: Negative.    Hematological: Negative.    Psychiatric/Behavioral: Negative.         Objective   Physical Exam  Vitals and nursing note reviewed. Exam conducted with a chaperone present.   Constitutional:       General: She is not in acute distress.     Appearance: She is well-developed. She is not diaphoretic.   HENT:      Head: Normocephalic.      Right Ear: External ear normal.      Left Ear: External ear normal.      Nose: Nose normal.   Eyes:      General: No scleral icterus.        Right eye: No discharge.         Left eye: No discharge.      Conjunctiva/sclera: Conjunctivae normal.      Pupils: Pupils are equal, round, and reactive to light.   Neck:      Thyroid: No thyromegaly.      Vascular: No carotid bruit.      Trachea: No tracheal deviation.   Cardiovascular:      Rate and Rhythm: Normal rate and regular rhythm.      Pulses: Normal pulses.      Heart sounds: Normal heart sounds. No murmur heard.  Pulmonary:      Effort:  Pulmonary effort is normal. No respiratory distress.      Breath sounds: Normal breath sounds. No wheezing.   Chest:   Breasts:     Breasts are symmetrical.      Right: Normal. No swelling, bleeding, inverted nipple, mass, nipple discharge, skin change or tenderness.      Left: Normal. No swelling, bleeding, inverted nipple, mass, nipple discharge, skin change or tenderness.   Abdominal:      General: Abdomen is flat. Bowel sounds are normal. There is no distension.      Palpations: Abdomen is soft. There is no mass.      Tenderness: There is no abdominal tenderness. There is no right CVA tenderness, left CVA tenderness or guarding.      Hernia: No hernia is present. There is no hernia in the left inguinal area or right inguinal area.   Genitourinary:     General: Normal vulva.      Exam position: Lithotomy position.      Labia:         Right: No rash, tenderness, lesion or injury.         Left: No rash, tenderness, lesion or injury.       Vagina: Normal. No signs of injury and foreign body. No vaginal discharge, erythema, tenderness or bleeding.      Cervix: Normal.      Uterus: Normal. Not enlarged, not fixed and not tender.       Adnexa: Right adnexa normal and left adnexa normal.        Right: No mass, tenderness or fullness.          Left: No mass, tenderness or fullness.        Rectum: Normal. No mass.      Comments: BSU normal  Urethral meatus  Normal  Perineum  Normal  Musculoskeletal:         General: No tenderness. Normal range of motion.      Cervical back: Normal range of motion and neck supple.   Lymphadenopathy:      Head:      Right side of head: No submental, submandibular, tonsillar, preauricular, posterior auricular or occipital adenopathy.      Left side of head: No submental, submandibular, tonsillar, preauricular, posterior auricular or occipital adenopathy.      Cervical: No cervical adenopathy.      Right cervical: No superficial, deep or posterior cervical adenopathy.     Left cervical: No  superficial, deep or posterior cervical adenopathy.      Upper Body:      Right upper body: No supraclavicular, axillary or pectoral adenopathy.      Left upper body: No supraclavicular, axillary or pectoral adenopathy.      Lower Body: No right inguinal adenopathy. No left inguinal adenopathy.   Skin:     General: Skin is warm and dry.      Findings: No bruising, erythema or rash.   Neurological:      Mental Status: She is alert and oriented to person, place, and time.      Coordination: Coordination normal.   Psychiatric:         Mood and Affect: Mood normal.         Behavior: Behavior normal.         Thought Content: Thought content normal.         Judgment: Judgment normal.         PHQ-9 Depression Screening  Little interest or pleasure in doing things? 0-->not at all   Feeling down, depressed, or hopeless? 0-->not at all   Trouble falling or staying asleep, or sleeping too much?     Feeling tired or having little energy?     Poor appetite or overeating?     Feeling bad about yourself - or that you are a failure or have let yourself or your family down?     Trouble concentrating on things, such as reading the newspaper or watching television?     Moving or speaking so slowly that other people could have noticed? Or the opposite - being so fidgety or restless that you have been moving around a lot more than usual?     Thoughts that you would be better off dead, or of hurting yourself in some way?     PHQ-9 Total Score 0   If you checked off any problems, how difficult have these problems made it for you to do your work, take care of things at home, or get along with other people?          Assessment & Plan   Diagnoses and all orders for this visit:    1. Well woman exam with routine gynecological exam (Primary)  Comments:  The patient is new to me and presents today for her annual well woman examination. The patient is established with her pcp but does request annual lab work today. The patient's LMP was 3/7/2024  and reports cycles are regular with use of ocp.   Orders:  -     CBC & Differential  -     Comprehensive Metabolic Panel  -     Lipid Panel With LDL / HDL Ratio  -     TSH  -     T3, Uptake  -     Vitamin D,25-Hydroxy  -     Hemoglobin A1c  -     T4, Free  -     UA / M With / Rflx Culture([facility] ONLY) - Urine, Clean Catch  -     Hepatitis C Antibody    2. Encounter for screening for cervical cancer  Comments:  The patient is sexually active and does request std screening added to her pap smear. Her last pap smear was performed in 2022, +HPV with colpo performed. She will have her cervical cancer screening pap smear and pelvic examination today.   Orders:  -     Liquid-based Pap Smear, Screening    3. Vaginal odor  Comments:  The patient voices a new onset of vaginal odor for the past several days. She has not tried any form of otc treatment for this. BV panel, pap smear std screening, and std blood work completed today.    4. Encounter for surveillance of contraceptive pills  Comments:  The patient reports doing well on her current ocp and wishes to continue this as her method for contraception.  Orders:  -     norgestimate-ethinyl estradiol (Ortho Tri-Cyclen Lo) 0.18/0.215/0.25 MG-25 MCG per tablet; Take 1 tablet by mouth Daily.  Dispense: 28 tablet; Refill: 12    5. Screening examination for STD (sexually transmitted disease)  Comments:  The patient is requesting std screening blood work today along with her pap smear.  Orders:  -     RPR  -     HIV-1 / O / 2 Ag / Antibody  -     HSV 1 & 2 - Specific Antibody, IgG  -     Hepatitis Panel, Acute    Normal GYN exam. Encouraged SBE, pt is aware how to do self breast exam and the importance of same. Pap smear is done per ASCCP guidelines. HPV is done. Lab work up is up to date.         Karina Cowan, APRN  3/20/2024

## 2024-03-23 LAB
25(OH)D3+25(OH)D2 SERPL-MCNC: 22.7 NG/ML (ref 30–100)
ALBUMIN SERPL-MCNC: 4.6 G/DL (ref 3.5–5.2)
ALBUMIN/GLOB SERPL: 2.3 G/DL
ALP SERPL-CCNC: 70 U/L (ref 39–117)
ALT SERPL-CCNC: 23 U/L (ref 1–33)
APPEARANCE UR: CLEAR
AST SERPL-CCNC: 15 U/L (ref 1–32)
BACTERIA #/AREA URNS HPF: ABNORMAL /HPF
BACTERIA UR CULT: ABNORMAL
BACTERIA UR CULT: ABNORMAL
BASOPHILS # BLD AUTO: 0.06 10*3/MM3 (ref 0–0.2)
BASOPHILS NFR BLD AUTO: 0.6 % (ref 0–1.5)
BILIRUB SERPL-MCNC: <0.2 MG/DL (ref 0–1.2)
BILIRUB UR QL STRIP: NEGATIVE
BUN SERPL-MCNC: 12 MG/DL (ref 6–20)
BUN/CREAT SERPL: 12.8 (ref 7–25)
CALCIUM SERPL-MCNC: 9.8 MG/DL (ref 8.6–10.5)
CASTS URNS QL MICRO: ABNORMAL /LPF
CHLORIDE SERPL-SCNC: 100 MMOL/L (ref 98–107)
CHOLEST SERPL-MCNC: 308 MG/DL (ref 0–200)
CO2 SERPL-SCNC: 23.5 MMOL/L (ref 22–29)
COLOR UR: YELLOW
CREAT SERPL-MCNC: 0.94 MG/DL (ref 0.57–1)
EGFRCR SERPLBLD CKD-EPI 2021: 83.4 ML/MIN/1.73
EOSINOPHIL # BLD AUTO: 0.15 10*3/MM3 (ref 0–0.4)
EOSINOPHIL NFR BLD AUTO: 1.4 % (ref 0.3–6.2)
EPI CELLS #/AREA URNS HPF: ABNORMAL /HPF (ref 0–10)
ERYTHROCYTE [DISTWIDTH] IN BLOOD BY AUTOMATED COUNT: 12.8 % (ref 12.3–15.4)
GLOBULIN SER CALC-MCNC: 2 GM/DL
GLUCOSE SERPL-MCNC: 79 MG/DL (ref 65–99)
GLUCOSE UR QL STRIP: NEGATIVE
HAV IGM SERPL QL IA: NEGATIVE
HBA1C MFR BLD: 5.5 % (ref 4.8–5.6)
HBV CORE IGM SERPL QL IA: NEGATIVE
HBV SURFACE AG SERPL QL IA: NEGATIVE
HCT VFR BLD AUTO: 42 % (ref 34–46.6)
HCV AB SERPL QL IA: NORMAL
HCV IGG SERPL QL IA: NON REACTIVE
HCV IGG SERPL QL IA: NON REACTIVE
HDLC SERPL-MCNC: 50 MG/DL (ref 40–60)
HGB BLD-MCNC: 14.2 G/DL (ref 12–15.9)
HGB UR QL STRIP: NEGATIVE
HIV 1+2 AB+HIV1 P24 AG SERPL QL IA: NON REACTIVE
HSV1 IGG SER IA-ACNC: 39.4 INDEX (ref 0–0.9)
HSV2 IGG SER IA-ACNC: <0.91 INDEX (ref 0–0.9)
IMM GRANULOCYTES # BLD AUTO: 0.03 10*3/MM3 (ref 0–0.05)
IMM GRANULOCYTES NFR BLD AUTO: 0.3 % (ref 0–0.5)
KETONES UR QL STRIP: NEGATIVE
LDLC SERPL CALC-MCNC: 206 MG/DL (ref 0–100)
LDLC/HDLC SERPL: 4.12 {RATIO}
LEUKOCYTE ESTERASE UR QL STRIP: ABNORMAL
LYMPHOCYTES # BLD AUTO: 2.83 10*3/MM3 (ref 0.7–3.1)
LYMPHOCYTES NFR BLD AUTO: 26.3 % (ref 19.6–45.3)
MCH RBC QN AUTO: 31.2 PG (ref 26.6–33)
MCHC RBC AUTO-ENTMCNC: 33.8 G/DL (ref 31.5–35.7)
MCV RBC AUTO: 92.3 FL (ref 79–97)
MICRO URNS: ABNORMAL
MONOCYTES # BLD AUTO: 0.43 10*3/MM3 (ref 0.1–0.9)
MONOCYTES NFR BLD AUTO: 4 % (ref 5–12)
NEUTROPHILS # BLD AUTO: 7.28 10*3/MM3 (ref 1.7–7)
NEUTROPHILS NFR BLD AUTO: 67.4 % (ref 42.7–76)
NITRITE UR QL STRIP: POSITIVE
NRBC BLD AUTO-RTO: 0 /100 WBC (ref 0–0.2)
OTHER ANTIBIOTIC SUSC ISLT: ABNORMAL
PH UR STRIP: 7 [PH] (ref 5–7.5)
PLATELET # BLD AUTO: 383 10*3/MM3 (ref 140–450)
POTASSIUM SERPL-SCNC: 4.7 MMOL/L (ref 3.5–5.2)
PROT SERPL-MCNC: 6.6 G/DL (ref 6–8.5)
PROT UR QL STRIP: NEGATIVE
RBC # BLD AUTO: 4.55 10*6/MM3 (ref 3.77–5.28)
RBC #/AREA URNS HPF: ABNORMAL /HPF (ref 0–2)
RPR SER QL: NON REACTIVE
SODIUM SERPL-SCNC: 136 MMOL/L (ref 136–145)
SP GR UR STRIP: 1.02 (ref 1–1.03)
T3RU NFR SERPL: 18 % (ref 24–39)
T4 FREE SERPL-MCNC: 0.91 NG/DL (ref 0.93–1.7)
TRIGL SERPL-MCNC: 261 MG/DL (ref 0–150)
TSH SERPL DL<=0.005 MIU/L-ACNC: 1.2 UIU/ML (ref 0.27–4.2)
URINALYSIS REFLEX: ABNORMAL
UROBILINOGEN UR STRIP-MCNC: 0.2 MG/DL (ref 0.2–1)
VLDLC SERPL CALC-MCNC: 52 MG/DL (ref 5–40)
WBC # BLD AUTO: 10.78 10*3/MM3 (ref 3.4–10.8)
WBC #/AREA URNS HPF: ABNORMAL /HPF (ref 0–5)

## 2024-03-25 DIAGNOSIS — B96.89 URINARY TRACT INFECTION DUE TO KLEBSIELLA SPECIES: Primary | ICD-10-CM

## 2024-03-25 DIAGNOSIS — E55.9 VITAMIN D DEFICIENCY: ICD-10-CM

## 2024-03-25 DIAGNOSIS — N39.0 URINARY TRACT INFECTION DUE TO KLEBSIELLA SPECIES: Primary | ICD-10-CM

## 2024-03-25 LAB
GEN CATEG CVX/VAG CYTO-IMP: NORMAL
HPV I/H RISK 4 DNA CVX QL PROBE+SIG AMP: DETECTED
HPV16 DNA SPEC QL NAA+PROBE: NOT DETECTED
HPV18+45 E6+E7 MRNA CVX QL NAA+PROBE: DETECTED
LAB AP CASE REPORT: NORMAL
LAB AP GYN ADDITIONAL INFORMATION: NORMAL
LAB AP GYN OTHER FINDINGS: NORMAL
Lab: NORMAL
PATH INTERP SPEC-IMP: NORMAL
STAT OF ADQ CVX/VAG CYTO-IMP: NORMAL

## 2024-03-25 RX ORDER — ERGOCALCIFEROL 1.25 MG/1
50000 CAPSULE ORAL WEEKLY
Qty: 30 CAPSULE | Refills: 1 | Status: SHIPPED | OUTPATIENT
Start: 2024-03-25

## 2024-03-25 RX ORDER — AMOXICILLIN AND CLAVULANATE POTASSIUM 875; 125 MG/1; MG/1
1 TABLET, FILM COATED ORAL EVERY 12 HOURS SCHEDULED
Status: DISCONTINUED | OUTPATIENT
Start: 2024-03-25 | End: 2024-03-26

## 2024-03-26 DIAGNOSIS — B96.89 URINARY TRACT INFECTION DUE TO KLEBSIELLA SPECIES: Primary | ICD-10-CM

## 2024-03-26 DIAGNOSIS — N39.0 URINARY TRACT INFECTION DUE TO KLEBSIELLA SPECIES: Primary | ICD-10-CM

## 2024-03-26 RX ORDER — AMOXICILLIN AND CLAVULANATE POTASSIUM 875; 125 MG/1; MG/1
1 TABLET, FILM COATED ORAL 2 TIMES DAILY
Qty: 14 TABLET | Refills: 0 | Status: SHIPPED | OUTPATIENT
Start: 2024-03-26 | End: 2024-04-02

## 2024-06-06 ENCOUNTER — OFFICE VISIT (OUTPATIENT)
Age: 34
End: 2024-06-06
Payer: COMMERCIAL

## 2024-06-06 VITALS
SYSTOLIC BLOOD PRESSURE: 114 MMHG | HEIGHT: 62 IN | BODY MASS INDEX: 28.16 KG/M2 | DIASTOLIC BLOOD PRESSURE: 78 MMHG | WEIGHT: 153 LBS

## 2024-06-06 DIAGNOSIS — R87.810 CERVICAL HIGH RISK HPV (HUMAN PAPILLOMAVIRUS) TEST POSITIVE: Primary | ICD-10-CM

## 2024-06-06 DIAGNOSIS — N76.0 BV (BACTERIAL VAGINOSIS): ICD-10-CM

## 2024-06-06 DIAGNOSIS — B96.89 BV (BACTERIAL VAGINOSIS): ICD-10-CM

## 2024-06-06 PROCEDURE — 88360 TUMOR IMMUNOHISTOCHEM/MANUAL: CPT | Performed by: OBSTETRICS & GYNECOLOGY

## 2024-06-06 PROCEDURE — 88342 IMHCHEM/IMCYTCHM 1ST ANTB: CPT | Performed by: OBSTETRICS & GYNECOLOGY

## 2024-06-06 PROCEDURE — 88305 TISSUE EXAM BY PATHOLOGIST: CPT | Performed by: OBSTETRICS & GYNECOLOGY

## 2024-06-06 RX ORDER — ONDANSETRON 4 MG/1
4 TABLET, FILM COATED ORAL EVERY 6 HOURS PRN
COMMUNITY
Start: 2024-05-17

## 2024-06-06 RX ORDER — IBUPROFEN 800 MG/1
TABLET ORAL
COMMUNITY
Start: 2024-03-25

## 2024-06-06 RX ORDER — DICYCLOMINE HCL 20 MG
TABLET ORAL
COMMUNITY
Start: 2024-05-17

## 2024-06-06 RX ORDER — LOPERAMIDE HYDROCHLORIDE 2 MG/1
CAPSULE ORAL
COMMUNITY
Start: 2024-05-17

## 2024-06-06 NOTE — PROGRESS NOTES
Preop Dx: 1. Normal pap smear with Positive HPV 18  Post op Dx: same  Procedure: colposcopy with biopsy of the cervix  Surgeon: Virginia Almaguer MD  Complications: none  EBL: minimal     Summary: Consent was obtained. Procedure/risks were explained including infection, bleeding, abdominal pain/cramping. Correct patient identified. Correct site confirmed and time out complete. Urine pregnancy test negative. Pt was prepped in the dorsal lithotomy positive.  A bivalve speculum was placed in the vagina. The cervix was prepped using acetic acid. Under colposcopic examination, the transition zone was seen in its entirety. Punch biopsies were performed 6 o'clock. Specimen was sent to pathology.  Silver nitrate was applied for hemostasis. Patient tolerated the procedure well.     Findings/impression:    Will call with biopsy results and further recommendations.  Pt also with BV on pap smear which was not treated. No evidence of BV on exam today. Nuswab sent for testing prior to treating.

## 2024-06-08 LAB
A VAGINAE DNA VAG QL NAA+PROBE: NORMAL SCORE
BVAB2 DNA VAG QL NAA+PROBE: NORMAL SCORE
C ALBICANS DNA VAG QL NAA+PROBE: NEGATIVE
C GLABRATA DNA VAG QL NAA+PROBE: NEGATIVE
MEGA1 DNA VAG QL NAA+PROBE: NORMAL SCORE
T VAGINALIS DNA VAG QL NAA+PROBE: NEGATIVE

## 2024-06-12 LAB
LAB AP CASE REPORT: NORMAL
LAB AP CLINICAL INFORMATION: NORMAL
LAB AP DIAGNOSIS COMMENT: NORMAL
LAB AP SPECIAL STAINS: NORMAL
Lab: NORMAL
PATH REPORT.FINAL DX SPEC: NORMAL
PATH REPORT.GROSS SPEC: NORMAL

## 2024-06-25 ENCOUNTER — TRANSCRIBE ORDERS (OUTPATIENT)
Dept: ADMINISTRATIVE | Facility: HOSPITAL | Age: 34
End: 2024-06-25
Payer: COMMERCIAL

## 2024-06-25 DIAGNOSIS — R10.10 UPPER ABDOMINAL PAIN: Primary | ICD-10-CM

## 2025-02-21 NOTE — PROGRESS NOTES
"Chief Complaint  Cough (Went to ER ([facility]) in January for chest pain. According to CXR she had bronchitis. Was told that due to the anatomy of her lungs she needed to have a PFT)    Subjective    History of Present Illness {CC  Problem List  Visit Diagnosis   Encounters  Notes  Medications  Labs  Result Review Imaging  Media     Jennifer Nicole presents to [facility] for:    History of Present Illness  Ms. Nicole is here as a new patient to establish care. She tells me she has \"a smoker's cough\".  She coughs mostly in the mornings and produces sputum.  She was sick in January 2025 and treated at an [facility] facility for bronchitis and pleurisy.  She had a sharp pain under her left breast around to her back.  Pain has mostly resolved but she has noticed intermittently.  She tells me there was some concern for flattened diaphragms and hyperinflation on a chest x-ray at that time.  She had a PFT completed that was normal.  She gets short of breath and wheezes with exercise.  She tells me if she runs she will get short of breath and wheeze.  She has albuterol if needed but has not needed it.  She has smoked for 20 years per her report.  She is a  and is exposed to a lot of secondhand smoke as well.       Prior to Admission medications    Medication Sig Start Date End Date Taking? Authorizing Provider   busPIRone (BUSPAR) 7.5 MG tablet Take 1 tablet by mouth three times daily as needed 7/12/23   Alice Quesada MD   dicyclomine (BENTYL) 20 MG tablet TAKE 1 TABLET ORALLY AS NEEDED EVERY 6 HOURS FOR PAIN 5/17/24   Chidi Saldivar MD   fluticasone (FLONASE) 50 MCG/ACT nasal spray USE 1 SPRAY(S) IN EACH NOSTRIL TWICE DAILY FOR 10 DAYS 1/18/23   Chidi Saldivar MD   ibuprofen (ADVIL,MOTRIN) 800 MG tablet TAKE 1 TABLET BY MOUTH EVERY 8 HOURS AS NEEDED WITH FOOD OR MILK 3/25/24   Chidi Saldivar MD   loperamide " "(IMODIUM) 2 MG capsule TAKE 1 TABLET ORAL AS NEEDED EVERY 6 HOURS FOR DIARRHEA 5/17/24   Chidi Saldivar MD   loratadine (CLARITIN) 10 MG tablet Take  by mouth.    Chidi Saldivar MD   LORazepam (ATIVAN) 0.5 MG tablet  8/3/23   Chidi Saldivar MD   norgestimate-ethinyl estradiol (Ortho Tri-Cyclen Lo) 0.18/0.215/0.25 MG-25 MCG per tablet Take 1 tablet by mouth Daily. 3/20/24 3/20/25  Karina Cowan APRN   omega-3 acid ethyl esters (LOVAZA) 1 g capsule  12/6/22   Chidi Saldivar MD   ondansetron (ZOFRAN) 4 MG tablet Take 1 tablet by mouth Every 6 (Six) Hours As Needed for Nausea or Vomiting. 5/17/24   Chidi Saldivar MD   Prenatal Vit-Fe Fumarate-FA (PRENATAL VITAMIN 27-0.8) 27-0.8 MG tablet tablet Take 1 tablet by mouth Daily. 1/16/20   Alice Quesada MD   valACYclovir (VALTREX) 1000 MG tablet TAKE 2 TABLETS BY MOUTH TWICE DAILY FOR 1 DAY 12/27/19   Chidi Saldivar MD   vitamin D (ERGOCALCIFEROL) 1.25 MG (35557 UT) capsule capsule Take 1 capsule by mouth 1 (One) Time Per Week. 3/25/24   Karina Cowan APRN       Social History     Socioeconomic History    Marital status:    Tobacco Use    Smoking status: Every Day     Current packs/day: 1.00     Average packs/day: 1 pack/day for 21.0 years (21.0 ttl pk-yrs)     Types: Cigarettes     Start date: 2024     Last attempt to quit: 1/4/2018     Passive exposure: Current    Smokeless tobacco: Never   Vaping Use    Vaping status: Never Used   Substance and Sexual Activity    Alcohol use: No    Drug use: No    Sexual activity: Yes     Partners: Male     Birth control/protection: None, Birth control pill       Objective   Vital Signs:   /66   Pulse 87   Ht 157.5 cm (62\")   Wt 67.1 kg (148 lb)   SpO2 98% Comment: RA  BMI 27.07 kg/m²     Physical Exam  Constitutional:       General: She is not in acute distress.  HENT:      Head: Normocephalic.      Nose: Nose normal.      Mouth/Throat:      Mouth: Mucous membranes are " "moist.   Eyes:      General: No scleral icterus.  Cardiovascular:      Rate and Rhythm: Normal rate.   Pulmonary:      Effort: No respiratory distress.   Abdominal:      General: There is no distension.   Neurological:      Mental Status: She is alert and oriented to person, place, and time.   Psychiatric:         Mood and Affect: Mood normal.         Behavior: Behavior is cooperative.        Result Review :{ Labs  Result Review  Imaging  Med Tab  Media :      My interpretation of the PFT : Normal    No results found for this or any previous visit.             Jennifer Nicole  reports that she has been smoking cigarettes. She started smoking about 13 months ago. She has a 21 pack-year smoking history. She has been exposed to tobacco smoke. She has never used smokeless tobacco.              Assessment and Plan {CC Problem List  Visit Diagnosis  ROS  Review (Popup)  Health Maintenance  Quality  BestPractice  Medications  SmartSets  SnapShot Encounters  Media      Diagnoses and all orders for this visit:    1. LYON (dyspnea on exertion) (Primary)  Comments:  With heavy exertion.  Normal PFT.  Albuterol if needed.  Orders:  -     Alpha - 1 - Antitrypsin; Future    2. Subacute cough  Comments:  Resolving from acute bronchitis. Reports a chronic daily \" smokers cough\"  Orders:  -     Walking Oximetry    3. Current every day smoker  Comments:  Cessation recommended.  No LDCT related to age.  If pain recurs would recommend CT chest at that time            Plan as above.  Office follow-up if needed.  Will follow-up AAT by phone.    Rola Copeland, APRN  2/24/2025  14:49 CST    Follow Up {Instructions Charge Capture  Follow-up Communications   Return if symptoms worsen or fail to improve.    Patient was given instructions and counseling regarding her condition or for health maintenance advice. Please see specific information pulled into the AVS if appropriate.     "

## 2025-02-24 ENCOUNTER — OFFICE VISIT (OUTPATIENT)
Dept: PULMONOLOGY | Facility: CLINIC | Age: 35
End: 2025-02-24
Payer: COMMERCIAL

## 2025-02-24 VITALS
DIASTOLIC BLOOD PRESSURE: 66 MMHG | OXYGEN SATURATION: 98 % | WEIGHT: 148 LBS | HEART RATE: 87 BPM | SYSTOLIC BLOOD PRESSURE: 100 MMHG | HEIGHT: 62 IN | BODY MASS INDEX: 27.23 KG/M2

## 2025-02-24 DIAGNOSIS — R05.2 SUBACUTE COUGH: ICD-10-CM

## 2025-02-24 DIAGNOSIS — R06.09 DOE (DYSPNEA ON EXERTION): Primary | Chronic | ICD-10-CM

## 2025-02-24 DIAGNOSIS — F17.200 CURRENT EVERY DAY SMOKER: Chronic | ICD-10-CM

## 2025-02-24 PROCEDURE — 99214 OFFICE O/P EST MOD 30 MIN: CPT | Performed by: NURSE PRACTITIONER

## 2025-02-24 RX ORDER — NICOTINE 21 MG/24HR
1 PATCH, TRANSDERMAL 24 HOURS TRANSDERMAL DAILY
COMMUNITY
Start: 2025-01-07

## 2025-02-24 RX ORDER — CETIRIZINE HYDROCHLORIDE 10 MG/1
1 TABLET ORAL DAILY
COMMUNITY
Start: 2025-01-27

## 2025-02-24 RX ORDER — CYCLOBENZAPRINE HCL 10 MG
TABLET ORAL
COMMUNITY
Start: 2025-02-20

## 2025-02-24 RX ORDER — ALBUTEROL SULFATE 90 UG/1
1-2 AEROSOL, METERED RESPIRATORY (INHALATION) EVERY 4 HOURS PRN
COMMUNITY
Start: 2024-11-06

## 2025-03-07 ENCOUNTER — TELEPHONE (OUTPATIENT)
Dept: PULMONOLOGY | Facility: CLINIC | Age: 35
End: 2025-03-07
Payer: COMMERCIAL

## 2025-03-07 DIAGNOSIS — R06.09 DOE (DYSPNEA ON EXERTION): Chronic | ICD-10-CM

## 2025-04-01 DIAGNOSIS — Z30.41 ENCOUNTER FOR SURVEILLANCE OF CONTRACEPTIVE PILLS: ICD-10-CM

## 2025-04-01 RX ORDER — NORGESTIMATE AND ETHINYL ESTRADIOL 7DAYSX3 LO
1 KIT ORAL DAILY
Qty: 28 TABLET | Refills: 0 | OUTPATIENT
Start: 2025-04-01

## 2025-04-07 DIAGNOSIS — Z30.41 ENCOUNTER FOR SURVEILLANCE OF CONTRACEPTIVE PILLS: ICD-10-CM

## 2025-04-08 RX ORDER — NORGESTIMATE AND ETHINYL ESTRADIOL 7DAYSX3 LO
1 KIT ORAL DAILY
Qty: 28 TABLET | Refills: 0 | Status: SHIPPED | OUTPATIENT
Start: 2025-04-08

## 2025-06-23 DIAGNOSIS — Z30.41 ENCOUNTER FOR SURVEILLANCE OF CONTRACEPTIVE PILLS: ICD-10-CM

## 2025-06-23 RX ORDER — NORGESTIMATE AND ETHINYL ESTRADIOL 7DAYSX3 LO
1 KIT ORAL DAILY
Qty: 28 TABLET | Refills: 0 | OUTPATIENT
Start: 2025-06-23

## (undated) DEVICE — SKIN AFFIX SURG ADHESIVE 72/CS 0.55ML: Brand: MEDLINE

## (undated) DEVICE — GLV SURG SENSICARE SLT PF LF 6 STRL

## (undated) DEVICE — SUT VIC 0 CT1 36IN J946H

## (undated) DEVICE — TBG PENCL TELESCP MEGADYNE SMOKE EVAC 10FT

## (undated) DEVICE — ADHS SKIN PREMIERPRO EXOFIN TOPICAL HI/VISC .5ML

## (undated) DEVICE — ANTIBACTERIAL VIOLET BRAIDED (POLYGLACTIN 910), SYNTHETIC ABSORBABLE SUTURE: Brand: COATED VICRYL

## (undated) DEVICE — Device

## (undated) DEVICE — APPL CHLORAPREP HI/LITE 26ML ORNG

## (undated) DEVICE — PATIENT RETURN ELECTRODE, SINGLE-USE, CONTACT QUALITY MONITORING, ADULT, WITH 15 FT (4.5 M) CORD. FOR PATIENTS WEIGHING OVER 33LBS. (15KG): Brand: MEGADYNE

## (undated) DEVICE — SLV SCD KN ADJ EXPRSS LG

## (undated) DEVICE — LARGE, DISPOSABLE C-SECTION RETRACTOR: Brand: ALEXIS ® O C-SECTION PROTECTOR/RETRACTOR

## (undated) DEVICE — CVR HNDL LIGHT RIGID

## (undated) DEVICE — SUT MNCRYL 4/0 PS2 27IN UD MCP426H

## (undated) DEVICE — SAFEPICO ASPIRATOR ARTERIAL BLOOD SAMPLER. ASPIRATOR ONLY WITH VENTED TIPCAP, 1.5 ML, BOX OF 100 PCS.: Brand: SAFEPICO ASPIRATOR

## (undated) DEVICE — KENDALL SCD EXPRESS SLEEVES, KNEE LENGTH, LARGE: Brand: KENDALL SCD

## (undated) DEVICE — APPL CHLORAPREP W/TINT 26ML ORNG

## (undated) DEVICE — PAD C-SECTION: Brand: MEDLINE INDUSTRIES, INC.